# Patient Record
Sex: MALE | Race: WHITE | NOT HISPANIC OR LATINO
[De-identification: names, ages, dates, MRNs, and addresses within clinical notes are randomized per-mention and may not be internally consistent; named-entity substitution may affect disease eponyms.]

---

## 2020-10-22 ENCOUNTER — APPOINTMENT (OUTPATIENT)
Dept: UROLOGY | Facility: CLINIC | Age: 70
End: 2020-10-22
Payer: MEDICARE

## 2020-10-22 VITALS
BODY MASS INDEX: 28.63 KG/M2 | SYSTOLIC BLOOD PRESSURE: 153 MMHG | TEMPERATURE: 97.3 F | DIASTOLIC BLOOD PRESSURE: 92 MMHG | HEIGHT: 70 IN | WEIGHT: 200 LBS | HEART RATE: 78 BPM | RESPIRATION RATE: 14 BRPM

## 2020-10-22 DIAGNOSIS — Z78.9 OTHER SPECIFIED HEALTH STATUS: ICD-10-CM

## 2020-10-22 DIAGNOSIS — Z60.2 PROBLEMS RELATED TO LIVING ALONE: ICD-10-CM

## 2020-10-22 PROBLEM — Z00.00 ENCOUNTER FOR PREVENTIVE HEALTH EXAMINATION: Status: ACTIVE | Noted: 2020-10-22

## 2020-10-22 PROCEDURE — 99204 OFFICE O/P NEW MOD 45 MIN: CPT

## 2020-10-22 SDOH — SOCIAL STABILITY - SOCIAL INSECURITY: PROBLEMS RELATED TO LIVING ALONE: Z60.2

## 2020-10-22 NOTE — HISTORY OF PRESENT ILLNESS
[FreeTextEntry1] : The patient is a 70-year-old gentleman was seen in the office today for the above. This symptom has been going on for 6-12 months. His daytime frequency is 8 times a day with nocturia x2. He also has a slow stream but denies all other urological and constitutional symptomatology.\par \par Past Urological History: Negative\par \par Urological Family History: Negative

## 2020-10-22 NOTE — PHYSICAL EXAM
[General Appearance - Well Developed] : well developed [General Appearance - Well Nourished] : well nourished [Normal Appearance] : normal appearance [Well Groomed] : well groomed [General Appearance - In No Acute Distress] : no acute distress [Edema] : no peripheral edema [FreeTextEntry1] : s1-S2 normal without murmur rub or gallop [Respiration, Rhythm And Depth] : normal respiratory rhythm and effort [Exaggerated Use Of Accessory Muscles For Inspiration] : no accessory muscle use [Auscultation Breath Sounds / Voice Sounds] : lungs were clear to auscultation bilaterally [Bowel Sounds] : normal bowel sounds [Abdomen Soft] : soft [Abdomen Tenderness] : non-tender [Abdomen Mass (___ Cm)] : no abdominal mass palpated [Costovertebral Angle Tenderness] : no ~M costovertebral angle tenderness [Urethral Meatus] : meatus normal [Penis Abnormality] : normal circumcised penis [Urinary Bladder Findings] : the bladder was normal on palpation [Scrotum] : the scrotum was normal [Epididymis] : the epididymides were normal [Testes Tenderness] : no tenderness of the testes [Testes Mass (___cm)] : there were no testicular masses [Anus Abnormality] : the anus and perineum were normal [Rectal Exam - Rectum] : digital rectal exam was normal [Prostate Tenderness] : the prostate was not tender [No Prostate Nodules] : no prostate nodules [Prostate Size ___ gm] : prostate size [unfilled] gm [Normal Station and Gait] : the gait and station were normal for the patient's age [] : no rash [No Focal Deficits] : no focal deficits [Oriented To Time, Place, And Person] : oriented to person, place, and time [Affect] : the affect was normal [Mood] : the mood was normal [Not Anxious] : not anxious [No Palpable Adenopathy] : no palpable adenopathy

## 2020-10-22 NOTE — ASSESSMENT
[FreeTextEntry1] : #1) symptomatic obstructive BPH: difficulty initiating the first void of the morning and has a slow dribbling stream-Daytime frequency 8 times a day with nocturia x2.\par Trial of tamsulosin 1HS\par Offered obtaining a PSA but he wanted to forego this for the time being\par \par RTO 2 months for reevaluation.

## 2020-11-11 ENCOUNTER — APPOINTMENT (OUTPATIENT)
Dept: UROLOGY | Facility: CLINIC | Age: 70
End: 2020-11-11
Payer: MEDICARE

## 2020-11-11 VITALS — BODY MASS INDEX: 28.63 KG/M2 | RESPIRATION RATE: 16 BRPM | TEMPERATURE: 97.7 F | HEIGHT: 70 IN | WEIGHT: 200 LBS

## 2020-11-11 PROCEDURE — 51702 INSERT TEMP BLADDER CATH: CPT

## 2020-11-11 PROCEDURE — 51798 US URINE CAPACITY MEASURE: CPT

## 2020-11-11 PROCEDURE — 99213 OFFICE O/P EST LOW 20 MIN: CPT | Mod: 25

## 2020-11-18 ENCOUNTER — APPOINTMENT (OUTPATIENT)
Dept: UROLOGY | Facility: CLINIC | Age: 70
End: 2020-11-18

## 2020-11-18 ENCOUNTER — APPOINTMENT (OUTPATIENT)
Dept: UROLOGY | Facility: CLINIC | Age: 70
End: 2020-11-18
Payer: MEDICARE

## 2020-11-18 VITALS — WEIGHT: 200 LBS | TEMPERATURE: 97.3 F | RESPIRATION RATE: 16 BRPM | BODY MASS INDEX: 28.63 KG/M2 | HEIGHT: 70 IN

## 2020-11-18 PROCEDURE — 51702 INSERT TEMP BLADDER CATH: CPT

## 2020-12-09 ENCOUNTER — APPOINTMENT (OUTPATIENT)
Dept: UROLOGY | Facility: CLINIC | Age: 70
End: 2020-12-09
Payer: MEDICARE

## 2020-12-09 VITALS — TEMPERATURE: 96.5 F | DIASTOLIC BLOOD PRESSURE: 78 MMHG | SYSTOLIC BLOOD PRESSURE: 117 MMHG | HEART RATE: 125 BPM

## 2020-12-09 PROCEDURE — 51702 INSERT TEMP BLADDER CATH: CPT

## 2020-12-23 ENCOUNTER — APPOINTMENT (OUTPATIENT)
Dept: UROLOGY | Facility: CLINIC | Age: 70
End: 2020-12-23
Payer: MEDICARE

## 2020-12-23 VITALS — TEMPERATURE: 97.2 F

## 2020-12-23 PROCEDURE — 99213 OFFICE O/P EST LOW 20 MIN: CPT

## 2020-12-23 NOTE — HISTORY OF PRESENT ILLNESS
[FreeTextEntry1] : The patient is a 70-year-old gentleman who was seen in the office today for the above.  He has been on tamsulosin 2 capsules and finasteride since 11/11/2020 and his last trial of voiding was on 12/9/2020.  Initially it was successful but he had to go to the emergency room at [facility] 9 PM that night because he was unable to void.  The catheter was reinserted.\par \par His past medical history, surgical history, medication history and allergy history are unchanged.

## 2020-12-30 ENCOUNTER — APPOINTMENT (OUTPATIENT)
Dept: UROLOGY | Facility: CLINIC | Age: 70
End: 2020-12-30
Payer: MEDICARE

## 2020-12-30 PROCEDURE — ZZZZZ: CPT

## 2020-12-31 ENCOUNTER — APPOINTMENT (OUTPATIENT)
Dept: UROLOGY | Facility: CLINIC | Age: 70
End: 2020-12-31
Payer: MEDICARE

## 2020-12-31 PROCEDURE — 51702 INSERT TEMP BLADDER CATH: CPT

## 2021-01-01 ENCOUNTER — NON-APPOINTMENT (OUTPATIENT)
Age: 71
End: 2021-01-01

## 2021-01-01 LAB
25(OH)D3 SERPL-MCNC: 35.8 NG/ML
ALBUMIN SERPL ELPH-MCNC: 4.5 G/DL
ALP BLD-CCNC: 310 U/L
ALT SERPL-CCNC: 10 U/L
ANION GAP SERPL CALC-SCNC: 13 MMOL/L
AST SERPL-CCNC: 14 U/L
BILIRUB SERPL-MCNC: 0.4 MG/DL
BUN SERPL-MCNC: 15 MG/DL
CALCIUM SERPL-MCNC: 6.3 MG/DL
CHLORIDE SERPL-SCNC: 105 MMOL/L
CO2 SERPL-SCNC: 23 MMOL/L
CREAT SERPL-MCNC: 0.92 MG/DL
GLUCOSE SERPL-MCNC: 123 MG/DL
LDH SERPL-CCNC: 279 U/L
MAGNESIUM SERPL-MCNC: 2.1 MG/DL
POTASSIUM SERPL-SCNC: 4 MMOL/L
PROT SERPL-MCNC: 6.8 G/DL
PSA SERPL-MCNC: 75 NG/ML
SODIUM SERPL-SCNC: 141 MMOL/L
TESTOST SERPL-MCNC: <2.5 NG/DL

## 2021-02-10 ENCOUNTER — APPOINTMENT (OUTPATIENT)
Dept: UROLOGY | Facility: CLINIC | Age: 71
End: 2021-02-10
Payer: MEDICARE

## 2021-02-10 VITALS — TEMPERATURE: 96 F

## 2021-02-10 PROCEDURE — 99213 OFFICE O/P EST LOW 20 MIN: CPT

## 2021-02-10 NOTE — ASSESSMENT
[FreeTextEntry1] : 1.)  Urinary retention secondary to BPH\par \par 2.)  Symptomatic obstructive BPH: Continue tamsulosin 2 capsules at bedtime and finasteride.\par \par RTO 6 months for reevaluation.

## 2021-02-10 NOTE — HISTORY OF PRESENT ILLNESS
[FreeTextEntry1] : The patient is a 70-year-old gentleman who was seen in the office today for the above.  He is presently on tamsulosin 2 capsules at bedtime and finasteride and is voiding without difficulty.  His daytime frequency is about every 2 hours with nocturia x2.  He denies all other urological and constitutional symptomatology.  His stream is much stronger.\par \par His past medical history, surgical history, medication history and allergy history are unchanged.

## 2021-02-10 NOTE — PHYSICAL EXAM
[General Appearance - Well Developed] : well developed [Normal Appearance] : normal appearance [General Appearance - Well Nourished] : well nourished [Well Groomed] : well groomed [General Appearance - In No Acute Distress] : no acute distress [Bowel Sounds] : normal bowel sounds [Abdomen Soft] : soft [Abdomen Tenderness] : non-tender [Abdomen Mass (___ Cm)] : no abdominal mass palpated [Costovertebral Angle Tenderness] : no ~M costovertebral angle tenderness [] : no rash [Edema] : no peripheral edema [Affect] : the affect was normal [Oriented To Time, Place, And Person] : oriented to person, place, and time [Mood] : the mood was normal [Not Anxious] : not anxious [Normal Station and Gait] : the gait and station were normal for the patient's age [No Focal Deficits] : no focal deficits

## 2021-02-17 ENCOUNTER — APPOINTMENT (OUTPATIENT)
Dept: UROLOGY | Facility: CLINIC | Age: 71
End: 2021-02-17

## 2021-06-03 ENCOUNTER — APPOINTMENT (OUTPATIENT)
Dept: UROLOGY | Facility: CLINIC | Age: 71
End: 2021-06-03
Payer: MEDICARE

## 2021-06-03 VITALS — DIASTOLIC BLOOD PRESSURE: 64 MMHG | TEMPERATURE: 98.1 F | HEART RATE: 90 BPM | SYSTOLIC BLOOD PRESSURE: 126 MMHG

## 2021-06-03 DIAGNOSIS — N52.01 ERECTILE DYSFUNCTION DUE TO ARTERIAL INSUFFICIENCY: ICD-10-CM

## 2021-06-03 PROCEDURE — 99214 OFFICE O/P EST MOD 30 MIN: CPT

## 2021-06-03 NOTE — HISTORY OF PRESENT ILLNESS
[FreeTextEntry1] : The patient is a 70-year-old gentleman who was seen in the office today for the above.  He is presently on tamsulosin 2 capsules at bedtime and finasteride and is voiding every hour in small amounts and he has nocturia x6.. At the last visit, he was taught self intermittent catheterization. For the last few weeks, he has been catheterizing himself twice in the middle of the night and voids spontaneously once in the middle of the night. He denies all other urological and constitutional symptomatology. So by catheterizing himself in the middle of the night he has decreased his nocturia from 6 times to 3 times (voiding once and cathing twice). He also is complaining of erectile dysfunction and is requesting medication.\par \par His past medical history, surgical history, medication history and allergy history are unchanged.

## 2021-06-03 NOTE — ASSESSMENT
[FreeTextEntry1] : 1.)  Urinary retention secondary to BPH\par \par 2.)  Symptomatic obstructive BPH: Continue tamsulosin 2 capsules at bedtime and finasteride.\par PSA ordered for next week-directed to call the office for the result.\par Complete urodynamic studies will be obtained to assess his bladder function in preparation for a possible TUR surgery. He is interested in having this done.\par He will be seen by the same urologist for the possible TUR who performs the urodynamics.\par \par #3) erectile dysfunction: Arterial insufficiency\par Start Cialis 20 mg p.o. as needed\par \par .

## 2021-06-09 ENCOUNTER — APPOINTMENT (OUTPATIENT)
Dept: UROLOGY | Facility: CLINIC | Age: 71
End: 2021-06-09
Payer: MEDICARE

## 2021-06-09 VITALS
WEIGHT: 200 LBS | HEART RATE: 85 BPM | SYSTOLIC BLOOD PRESSURE: 146 MMHG | OXYGEN SATURATION: 96 % | HEIGHT: 70 IN | DIASTOLIC BLOOD PRESSURE: 77 MMHG | BODY MASS INDEX: 28.63 KG/M2

## 2021-06-09 PROCEDURE — 51728 CYSTOMETROGRAM W/VP: CPT

## 2021-06-09 PROCEDURE — 51741 ELECTRO-UROFLOWMETRY FIRST: CPT

## 2021-06-09 PROCEDURE — 51784 ANAL/URINARY MUSCLE STUDY: CPT

## 2021-06-09 PROCEDURE — 51797 INTRAABDOMINAL PRESSURE TEST: CPT

## 2021-06-10 ENCOUNTER — APPOINTMENT (OUTPATIENT)
Dept: UROLOGY | Facility: CLINIC | Age: 71
End: 2021-06-10
Payer: MEDICARE

## 2021-06-10 VITALS — TEMPERATURE: 98.1 F

## 2021-06-10 DIAGNOSIS — R33.9 RETENTION OF URINE, UNSPECIFIED: ICD-10-CM

## 2021-06-10 PROCEDURE — 99213 OFFICE O/P EST LOW 20 MIN: CPT

## 2021-06-10 NOTE — HISTORY OF PRESENT ILLNESS
[FreeTextEntry1] : The patient is a 70-year-old gentleman who was seen in the office today for the above.  He has no new urological or constitutional symptomatology.\par \par His past medical history, surgical history, medication history and allergy history are unchanged.

## 2021-06-10 NOTE — ASSESSMENT
[FreeTextEntry1] : 1.)  Obstructive symptomatic BPH: On tamsulosin 2 capsules and finasteride as well as intermittent self catheterization.\par Urodynamics is consistent with outlet obstruction.  The studies were performed by Dr. Jefferson.\par He was referred to Dr. Jefferson to discuss the surgical options for a transurethral procedure.  He already told the patient that he will also need an office cystoscopy and a prostate ultrasound to assess the prostate further.

## 2021-06-11 ENCOUNTER — NON-APPOINTMENT (OUTPATIENT)
Age: 71
End: 2021-06-11

## 2021-06-11 ENCOUNTER — APPOINTMENT (OUTPATIENT)
Dept: FAMILY MEDICINE | Facility: CLINIC | Age: 71
End: 2021-06-11
Payer: MEDICARE

## 2021-06-11 VITALS
TEMPERATURE: 96.8 F | OXYGEN SATURATION: 98 % | DIASTOLIC BLOOD PRESSURE: 82 MMHG | SYSTOLIC BLOOD PRESSURE: 150 MMHG | HEART RATE: 87 BPM | WEIGHT: 192 LBS | BODY MASS INDEX: 27.49 KG/M2 | HEIGHT: 70 IN

## 2021-06-11 PROCEDURE — 99213 OFFICE O/P EST LOW 20 MIN: CPT

## 2021-06-11 NOTE — ASSESSMENT
[FreeTextEntry1] : pain present mainly at night time\par exam unremarkable at this time\par given zanaflex to take prn \par follow up as needed or if no relief

## 2021-06-11 NOTE — HISTORY OF PRESENT ILLNESS
[FreeTextEntry8] : PT presenting for left sided back pain x 5 days\par thinks he pulled a muscle while moving stuff in his house. \par Took aleve./tylenol no relief\par \par HAppened previously in November- had similar pain, took 1 oxycontin and pain went away until now\par no numbness or tingling\par no radiating pain\par sharp back pain only at night\par fine during the day \par wants to know if he can have something to help \par \par

## 2021-06-15 ENCOUNTER — APPOINTMENT (OUTPATIENT)
Dept: UROLOGY | Facility: CLINIC | Age: 71
End: 2021-06-15
Payer: MEDICARE

## 2021-06-15 ENCOUNTER — APPOINTMENT (OUTPATIENT)
Dept: FAMILY MEDICINE | Facility: CLINIC | Age: 71
End: 2021-06-15
Payer: MEDICARE

## 2021-06-15 VITALS
OXYGEN SATURATION: 97 % | TEMPERATURE: 97.1 F | SYSTOLIC BLOOD PRESSURE: 142 MMHG | HEART RATE: 90 BPM | BODY MASS INDEX: 26.92 KG/M2 | DIASTOLIC BLOOD PRESSURE: 86 MMHG | HEIGHT: 70 IN | WEIGHT: 188 LBS

## 2021-06-15 VITALS — TEMPERATURE: 98.3 F

## 2021-06-15 PROCEDURE — 99214 OFFICE O/P EST MOD 30 MIN: CPT

## 2021-06-15 PROCEDURE — 99213 OFFICE O/P EST LOW 20 MIN: CPT

## 2021-06-15 RX ORDER — TIZANIDINE HYDROCHLORIDE 6 MG/1
6 CAPSULE ORAL TWICE DAILY
Qty: 10 | Refills: 0 | Status: DISCONTINUED | COMMUNITY
Start: 2021-06-11 | End: 2021-06-15

## 2021-06-15 RX ORDER — TIZANIDINE HYDROCHLORIDE 4 MG/1
4 CAPSULE ORAL
Qty: 30 | Refills: 0 | Status: DISCONTINUED | COMMUNITY
Start: 2021-06-11 | End: 2021-06-15

## 2021-06-15 NOTE — HISTORY OF PRESENT ILLNESS
[FreeTextEntry1] : 69 yo male presents to discuss further management for Benign Prostatic Hyperplasia. \par On Flomax and Finasteride. \par Reports variable stream, urinates every 2-3 hours or so during the day. Nocturia of 1-2 x. \par Catheterizes 9p and 4a. \par Has off and on hesitancy, intermittency, sense of incomplete emptying, urgency and urge incontinence. \par Denies dysuria, hematuria, lower abdominal or flank pain, fever, chills or rigors.\par Has Erectile dysfunction. \par No family history of Prostate cancer. \par \par Underwent Urodynamics with me last week. \par \par

## 2021-06-15 NOTE — HISTORY OF PRESENT ILLNESS
[FreeTextEntry8] : PT presenting for followup of back pain. \par Was placed on zanaflex- had severe reaction to it and got stopped it yday\par it did help a little\par yesterday realized that his bed may be the issue\par is getting a newe bed today\par oxycodone helped in the past

## 2021-06-15 NOTE — ASSESSMENT
[FreeTextEntry1] : Benign Prostatic Hyperplasia:\par Discussed treatment options mainly: continued medical management with alpha blocker and or 5 alpha reductase inhibitor Vs Clean intermittent catheterization/Indwelling Fontana catheter Vs Surgery: Transurethral water vapor ablation/resection/vaporization of Prostate/Simple prostatectomy- open Vs robotic after appropriate work up.\par Patient wants to proceed with further work up. \par Will get Urinalysis and Urine culture.\par PSA pending. \par \par Return to office for Prostate Ultrasound- will do Uroflo/PVR. \par \par \par

## 2021-07-05 LAB — PSA SERPL-MCNC: 921 NG/ML

## 2021-07-06 ENCOUNTER — APPOINTMENT (OUTPATIENT)
Dept: UROLOGY | Facility: CLINIC | Age: 71
End: 2021-07-06
Payer: MEDICARE

## 2021-07-06 VITALS — TEMPERATURE: 98 F

## 2021-07-06 DIAGNOSIS — N39.0 URINARY TRACT INFECTION, SITE NOT SPECIFIED: ICD-10-CM

## 2021-07-06 LAB
APPEARANCE: CLEAR
BACTERIA UR CULT: ABNORMAL
BACTERIA: ABNORMAL
BILIRUBIN URINE: NEGATIVE
BLOOD URINE: ABNORMAL
COLOR: NORMAL
GLUCOSE QUALITATIVE U: NEGATIVE
HYALINE CASTS: 0 /LPF
KETONES URINE: NEGATIVE
LEUKOCYTE ESTERASE URINE: ABNORMAL
MICROSCOPIC-UA: NORMAL
NITRITE URINE: POSITIVE
PH URINE: 5.5
PROTEIN URINE: NEGATIVE
RED BLOOD CELLS URINE: 2 /HPF
SPECIFIC GRAVITY URINE: 1.02
SQUAMOUS EPITHELIAL CELLS: 0 /HPF
UROBILINOGEN URINE: NORMAL
WHITE BLOOD CELLS URINE: 17 /HPF

## 2021-07-06 PROCEDURE — 99214 OFFICE O/P EST MOD 30 MIN: CPT

## 2021-07-19 NOTE — ASSESSMENT
[FreeTextEntry1] : Benign Prostatic Hyperplasia:\par Urinary tract infection:\par Elevated PSA:\par Will start Cefuroxime \par Recommended repeat Urine test 1 week after completion of Antibiotics course. \par Will repeat PSA.\par Continue Flomax and Finasteride. \par Continue clean intermittent catheterizations BID. \par Will inform results. If still concerning will get MRI Prostate. \par \par Will do Cystoscopy at later date.

## 2021-07-19 NOTE — HISTORY OF PRESENT ILLNESS
[FreeTextEntry1] : 71 yo male presents for follow up. \par Urinating with more frequency, day before yesterday felt tired, was urinating every 30 minutes or so. \par Doing clean intermittent catheterizations 2 x a day. \par \par Recently seen to discuss further management for Benign Prostatic Hyperplasia. \par On Flomax and Finasteride. \par Reported variable stream, urinated every 2-3 hours or so during the day. Nocturia of 1-2 x. \par Catheterized 9p and 4a. \par Had off and on hesitancy, intermittency, sense of incomplete emptying, urgency and urge incontinence. \par Denied dysuria, hematuria, lower abdominal or flank pain, fever, chills or rigors.\par Has Erectile dysfunction. \par No family history of Prostate cancer. \par \par Underwent Urodynamics(6/9/21): bladder outlet obstruction.\par \par

## 2021-07-22 LAB
APPEARANCE: CLEAR
BACTERIA UR CULT: NORMAL
BACTERIA: NEGATIVE
BILIRUBIN URINE: NEGATIVE
BLOOD URINE: ABNORMAL
COLOR: NORMAL
GLUCOSE QUALITATIVE U: NEGATIVE
HYALINE CASTS: 0 /LPF
KETONES URINE: NORMAL
LEUKOCYTE ESTERASE URINE: NEGATIVE
MICROSCOPIC-UA: NORMAL
NITRITE URINE: NEGATIVE
PH URINE: 5.5
PROTEIN URINE: NORMAL
PSA FREE FLD-MCNC: NORMAL %
PSA FREE SERPL-MCNC: >50 NG/ML
PSA SERPL-MCNC: 1079 NG/ML
RED BLOOD CELLS URINE: 13 /HPF
SPECIFIC GRAVITY URINE: 1.02
SQUAMOUS EPITHELIAL CELLS: 0 /HPF
UROBILINOGEN URINE: NORMAL
WHITE BLOOD CELLS URINE: 1 /HPF

## 2021-07-27 ENCOUNTER — APPOINTMENT (OUTPATIENT)
Dept: FAMILY MEDICINE | Facility: CLINIC | Age: 71
End: 2021-07-27
Payer: MEDICARE

## 2021-07-27 VITALS
BODY MASS INDEX: 25.34 KG/M2 | HEIGHT: 70 IN | SYSTOLIC BLOOD PRESSURE: 126 MMHG | WEIGHT: 177 LBS | HEART RATE: 100 BPM | OXYGEN SATURATION: 97 % | DIASTOLIC BLOOD PRESSURE: 70 MMHG | TEMPERATURE: 97.4 F

## 2021-07-27 PROCEDURE — 99213 OFFICE O/P EST LOW 20 MIN: CPT

## 2021-07-28 ENCOUNTER — APPOINTMENT (OUTPATIENT)
Dept: RADIOLOGY | Facility: CLINIC | Age: 71
End: 2021-07-28
Payer: MEDICARE

## 2021-07-28 ENCOUNTER — OUTPATIENT (OUTPATIENT)
Dept: OUTPATIENT SERVICES | Facility: HOSPITAL | Age: 71
LOS: 1 days | End: 2021-07-28
Payer: MEDICARE

## 2021-07-28 DIAGNOSIS — M25.559 PAIN IN UNSPECIFIED HIP: ICD-10-CM

## 2021-07-28 PROCEDURE — 73502 X-RAY EXAM HIP UNI 2-3 VIEWS: CPT

## 2021-07-28 PROCEDURE — 73502 X-RAY EXAM HIP UNI 2-3 VIEWS: CPT | Mod: 26,RT

## 2021-07-28 NOTE — REVIEW OF SYSTEMS
[Hesitancy] : hesitancy [Frequency] : frequency [Joint Pain] : joint pain [Joint Stiffness] : joint stiffness [Muscle Pain] : muscle pain [Back Pain] : back pain [Joint Swelling] : joint swelling [Negative] : Constitutional

## 2021-07-28 NOTE — HISTORY OF PRESENT ILLNESS
[FreeTextEntry8] : Having Hip pain x 1 month \par was on the roof trying to clean the roof\par thinks he sprained the groin muscle on the right \par difficulty when he gets up but then able to ambulate without concern \par \par Went to see urology - elevated PSA\par scheduled for MRI of the prostate next week

## 2021-07-28 NOTE — PHYSICAL EXAM
[Normal] : no acute distress, well nourished, well developed and well-appearing [de-identified] : no pain in hip limited ROM on right side.

## 2021-08-04 ENCOUNTER — APPOINTMENT (OUTPATIENT)
Dept: MRI IMAGING | Facility: CLINIC | Age: 71
End: 2021-08-04
Payer: MEDICARE

## 2021-08-04 ENCOUNTER — APPOINTMENT (OUTPATIENT)
Dept: CT IMAGING | Facility: CLINIC | Age: 71
End: 2021-08-04
Payer: MEDICARE

## 2021-08-04 ENCOUNTER — RESULT REVIEW (OUTPATIENT)
Age: 71
End: 2021-08-04

## 2021-08-04 ENCOUNTER — OUTPATIENT (OUTPATIENT)
Dept: OUTPATIENT SERVICES | Facility: HOSPITAL | Age: 71
LOS: 1 days | End: 2021-08-04
Payer: MEDICARE

## 2021-08-04 DIAGNOSIS — R97.20 ELEVATED PROSTATE SPECIFIC ANTIGEN [PSA]: ICD-10-CM

## 2021-08-04 PROCEDURE — 74178 CT ABD&PLV WO CNTR FLWD CNTR: CPT

## 2021-08-04 PROCEDURE — 76377 3D RENDER W/INTRP POSTPROCES: CPT | Mod: 26

## 2021-08-04 PROCEDURE — 72197 MRI PELVIS W/O & W/DYE: CPT | Mod: 26,MH

## 2021-08-04 PROCEDURE — 82565 ASSAY OF CREATININE: CPT

## 2021-08-04 PROCEDURE — 74178 CT ABD&PLV WO CNTR FLWD CNTR: CPT | Mod: 26,MH

## 2021-08-04 PROCEDURE — 76377 3D RENDER W/INTRP POSTPROCES: CPT

## 2021-08-04 PROCEDURE — 72197 MRI PELVIS W/O & W/DYE: CPT

## 2021-08-10 ENCOUNTER — APPOINTMENT (OUTPATIENT)
Dept: UROLOGY | Facility: CLINIC | Age: 71
End: 2021-08-10
Payer: MEDICARE

## 2021-08-10 VITALS — TEMPERATURE: 97.9 F

## 2021-08-10 DIAGNOSIS — Z01.818 ENCOUNTER FOR OTHER PREPROCEDURAL EXAMINATION: ICD-10-CM

## 2021-08-10 DIAGNOSIS — R31.29 OTHER MICROSCOPIC HEMATURIA: ICD-10-CM

## 2021-08-10 DIAGNOSIS — F41.1 GENERALIZED ANXIETY DISORDER: ICD-10-CM

## 2021-08-10 PROCEDURE — 99214 OFFICE O/P EST MOD 30 MIN: CPT

## 2021-08-11 ENCOUNTER — NON-APPOINTMENT (OUTPATIENT)
Age: 71
End: 2021-08-11

## 2021-08-18 ENCOUNTER — APPOINTMENT (OUTPATIENT)
Dept: UROLOGY | Facility: CLINIC | Age: 71
End: 2021-08-18

## 2021-08-18 PROBLEM — R31.29 MICROHEMATURIA: Status: ACTIVE | Noted: 2021-07-22

## 2021-08-18 NOTE — ASSESSMENT
[FreeTextEntry1] : Elevated PSA:\par Discussed MRI Prostate findings. \par Recommended MRI fusion prostate biopsy.\par Discussed real time ultrasound will be calibrated with MRI images and additional target biopsies will be performed besides regular 12 core Prostate biopsy.\par Explained to the patient that the procedure would last approximately 20 minutes beginning first with insertion of the ultrasound probe, injection of local anesthetic, and finally multiple samples would be obtained with a needle biopsy through the perineum.\par Reviewed the risks, benefits, and possible complications including inability to urinate, infection, and bleeding. The patient understood that blood in the urine, stool, and semen is common for several days to weeks, and that he should go immediately to the Emergency Room if he develops fever, chills, nausea or vomiting. \par All questions answered. The patient verbalized understanding, and has agreed to proceed. \par Patient was provided with a copy of instruction sheet. \par \par Order placed for Valium and Fleet's enema. \par Pt instructed to take Valium an hour before the procedure and to give himself enema the morning of the procedure. \par Pt instructed to stop taking ASA/Plavix/Coumadin/NSAIDs 1 week before appointment.\par \par Will schedule with Dr Ulisses dempsey available. \par \par Microhematuria:\par Discussed work up of hematuria so far. \par Will do Urine cytology and Cystoscopy at later date.

## 2021-08-18 NOTE — HISTORY OF PRESENT ILLNESS
[FreeTextEntry1] : 70 yo male presents for follow up. \par Had MRI Prostate and CT scan. \par Doing clean intermittent catheterizations 2 x a day. \par \par Recently seen to discuss further management for Benign Prostatic Hyperplasia. \par On Flomax and Finasteride. \par Reported variable stream, urinated every 2-3 hours or so during the day. Nocturia of 1-2 x. \par Catheterized 9p and 4a. \par Had off and on hesitancy, intermittency, sense of incomplete emptying, urgency and urge incontinence. \par Denied dysuria, hematuria, lower abdominal or flank pain, fever, chills or rigors.\par Has Erectile dysfunction. \par No family history of Prostate cancer. \par \par Underwent Urodynamics(6/9/21): bladder outlet obstruction.\par \par

## 2021-08-31 ENCOUNTER — NON-APPOINTMENT (OUTPATIENT)
Age: 71
End: 2021-08-31

## 2021-09-01 ENCOUNTER — APPOINTMENT (OUTPATIENT)
Dept: UROLOGY | Facility: CLINIC | Age: 71
End: 2021-09-01

## 2021-09-01 ENCOUNTER — APPOINTMENT (OUTPATIENT)
Dept: FAMILY MEDICINE | Facility: CLINIC | Age: 71
End: 2021-09-01
Payer: MEDICARE

## 2021-09-01 ENCOUNTER — APPOINTMENT (OUTPATIENT)
Dept: UROLOGY | Facility: CLINIC | Age: 71
End: 2021-09-01
Payer: MEDICARE

## 2021-09-01 VITALS
DIASTOLIC BLOOD PRESSURE: 65 MMHG | OXYGEN SATURATION: 97 % | WEIGHT: 163.4 LBS | BODY MASS INDEX: 23.39 KG/M2 | SYSTOLIC BLOOD PRESSURE: 107 MMHG | HEART RATE: 99 BPM | TEMPERATURE: 98 F | HEIGHT: 70 IN | RESPIRATION RATE: 17 BRPM

## 2021-09-01 VITALS
HEART RATE: 102 BPM | DIASTOLIC BLOOD PRESSURE: 68 MMHG | TEMPERATURE: 97 F | OXYGEN SATURATION: 96 % | WEIGHT: 167 LBS | BODY MASS INDEX: 23.91 KG/M2 | HEIGHT: 70 IN | SYSTOLIC BLOOD PRESSURE: 102 MMHG

## 2021-09-01 PROCEDURE — 76942 ECHO GUIDE FOR BIOPSY: CPT | Mod: 59

## 2021-09-01 PROCEDURE — 76872 US TRANSRECTAL: CPT

## 2021-09-01 PROCEDURE — 99214 OFFICE O/P EST MOD 30 MIN: CPT

## 2021-09-01 PROCEDURE — 55700: CPT | Mod: 22

## 2021-09-01 NOTE — REVIEW OF SYSTEMS
[Joint Pain] : joint pain [Joint Stiffness] : joint stiffness [Muscle Pain] : muscle pain [Back Pain] : back pain [Negative] : Constitutional

## 2021-09-01 NOTE — ASSESSMENT
[FreeTextEntry1] : cont tylenol/aleve prn\par await results of prostate biopsy\par given muscle relaxant \par refilled oxy reviewed  \par take prn

## 2021-09-01 NOTE — PHYSICAL EXAM
[Normal] : no acute distress, well nourished, well developed and well-appearing [de-identified] : hip pain, left trap pain

## 2021-09-01 NOTE — HISTORY OF PRESENT ILLNESS
[FreeTextEntry1] : LEft shoulder pain  [de-identified] : Followup of prostate issues, hip pain, left shoulde rpain \par \par \par Prostate- had biopsy done this AM, awaiting results. Will have results in approx 2 weeks\par lost 10 lbs since last visit 5 weeks ago \par \par Hip pain- slightly improved, using cane\par aleve was helping a lot, had to stop 1 week prior to biopsy \par is taking tylenol not helping as much\par \par Left shoulder- was trying to exercise with wrenches, thinks he pulled a muscle\par heat and pressure help a lot \par no numbness or tingling. \par

## 2021-09-15 ENCOUNTER — APPOINTMENT (OUTPATIENT)
Dept: UROLOGY | Facility: CLINIC | Age: 71
End: 2021-09-15
Payer: MEDICARE

## 2021-09-15 VITALS
HEIGHT: 70 IN | OXYGEN SATURATION: 96 % | BODY MASS INDEX: 23.91 KG/M2 | DIASTOLIC BLOOD PRESSURE: 67 MMHG | WEIGHT: 167 LBS | HEART RATE: 97 BPM | SYSTOLIC BLOOD PRESSURE: 151 MMHG

## 2021-09-15 PROCEDURE — 99214 OFFICE O/P EST MOD 30 MIN: CPT

## 2021-09-16 ENCOUNTER — APPOINTMENT (OUTPATIENT)
Dept: FAMILY MEDICINE | Facility: CLINIC | Age: 71
End: 2021-09-16
Payer: MEDICARE

## 2021-09-16 VITALS
BODY MASS INDEX: 23.91 KG/M2 | WEIGHT: 167 LBS | SYSTOLIC BLOOD PRESSURE: 128 MMHG | TEMPERATURE: 97.6 F | DIASTOLIC BLOOD PRESSURE: 70 MMHG | HEART RATE: 108 BPM | RESPIRATION RATE: 16 BRPM | OXYGEN SATURATION: 98 % | HEIGHT: 70 IN

## 2021-09-16 LAB — CORE LAB BIOPSY: NORMAL

## 2021-09-16 PROCEDURE — 99214 OFFICE O/P EST MOD 30 MIN: CPT

## 2021-09-16 NOTE — PHYSICAL EXAM
[Normal] : normal rate, regular rhythm, normal S1 and S2 and no murmur heard [de-identified] : severe right hip pain

## 2021-09-16 NOTE — REVIEW OF SYSTEMS
[Nocturia] : nocturia [Frequency] : frequency [Joint Stiffness] : joint stiffness [Muscle Pain] : muscle pain [Joint Swelling] : joint swelling [Negative] : Gastrointestinal

## 2021-09-16 NOTE — HISTORY OF PRESENT ILLNESS
[FreeTextEntry1] : hip pain  [de-identified] : Pt presenting for hip pain and followup of urology lab results. \par \par Diagnosed with advances prostate cancer yesterday, scheduled for lab followup and NM bone scan to rule out malignancy \par pt is trying to be optimistic however is very upset which is understandable. \par \par Still having significant right hip pain, oxy and flexiril works well for pain.\par discussed that next step would be MRI of hip however will await results of bone scan. \par Has appt with ortho in 6 weeks however pt is unable to walk or bear weight- needs sooner appt

## 2021-09-17 LAB
ALBUMIN SERPL ELPH-MCNC: 3.5 G/DL
ALP BLD-CCNC: 667 U/L
ALT SERPL-CCNC: 19 U/L
ANION GAP SERPL CALC-SCNC: 15 MMOL/L
AST SERPL-CCNC: 31 U/L
BASOPHILS # BLD AUTO: 0.12 K/UL
BASOPHILS NFR BLD AUTO: 1.3 %
BILIRUB SERPL-MCNC: 0.2 MG/DL
BUN SERPL-MCNC: 26 MG/DL
CALCIUM SERPL-MCNC: 9.2 MG/DL
CHLORIDE SERPL-SCNC: 100 MMOL/L
CO2 SERPL-SCNC: 25 MMOL/L
CREAT SERPL-MCNC: 1.14 MG/DL
EOSINOPHIL # BLD AUTO: 0.32 K/UL
EOSINOPHIL NFR BLD AUTO: 3.6 %
GLUCOSE SERPL-MCNC: 172 MG/DL
HCT VFR BLD CALC: 34.4 %
HGB BLD-MCNC: 10.4 G/DL
IMM GRANULOCYTES NFR BLD AUTO: 1.9 %
LYMPHOCYTES # BLD AUTO: 2.04 K/UL
LYMPHOCYTES NFR BLD AUTO: 22.8 %
MAN DIFF?: NORMAL
MCHC RBC-ENTMCNC: 26.8 PG
MCHC RBC-ENTMCNC: 30.2 GM/DL
MCV RBC AUTO: 88.7 FL
MONOCYTES # BLD AUTO: 0.73 K/UL
MONOCYTES NFR BLD AUTO: 8.1 %
NEUTROPHILS # BLD AUTO: 5.58 K/UL
NEUTROPHILS NFR BLD AUTO: 62.3 %
PLATELET # BLD AUTO: 417 K/UL
POTASSIUM SERPL-SCNC: 4.3 MMOL/L
PROT SERPL-MCNC: 6.6 G/DL
RBC # BLD: 3.88 M/UL
RBC # FLD: 13.8 %
SODIUM SERPL-SCNC: 141 MMOL/L
WBC # FLD AUTO: 8.96 K/UL

## 2021-09-17 NOTE — PHYSICAL EXAM
[General Appearance - In No Acute Distress] : no acute distress [Abdomen Soft] : soft [Urethral Meatus] : meatus normal [Skin Color & Pigmentation] : normal skin color and pigmentation [Edema] : no peripheral edema [] : no respiratory distress [Oriented To Time, Place, And Person] : oriented to person, place, and time [Affect] : the affect was normal [No Focal Deficits] : no focal deficits [No Palpable Adenopathy] : no palpable adenopathy [FreeTextEntry1] : Pt. has a weak gait, ambulated with a cane. c/p right hip pain.

## 2021-09-17 NOTE — ASSESSMENT
[FreeTextEntry1] : Reviewed prostate biopsy results. \par GS 9 (4+5) located in the 2 cores that were taken. Pt. had difficulty tolerating the biopsy procedure.\par Discussed with patient and family member that there is a high suspicion for metastatic prostate cancer.\par NM bone scan ordered for further baseline staging for his prostate cancer. \par Pt. has recently seen his PCP Dr. Vail and was referred to an orthopedic doctor for his right hip/leg pain. \par Pt. encouraged to calll PCP to expedite and earlier appointment with orthopedic doctor. \par Pt. started on biclutamide 50 mg daily. Medication explained to patient. \par It was discussed that patient would need to start on a aggressive treatment regimen for hormonal injection. \par Referred to medical oncologist Dr. Collins for an initial consultation. \par All questions answered and all concerns addressed. \par Pt. is aware that he is not a surgical candidate and that his advanced prostate cancer will be managed by Dr. Collins. \par Pt. and family member verbalize an understanding to plan of care. \par Pt. has an unsteady gait and uses a cane. Wheelchair used in office for patient. Safety measures need to be implemented at home. Discussed with family member. Pt. lives alone. \par We remain available for nay further questions or concerns.

## 2021-09-17 NOTE — HISTORY OF PRESENT ILLNESS
[None] : no symptoms [FreeTextEntry1] : 70 yo presents today for a follow-up appointment to discuss his prostate biopsy results. Transperineal prostate biopsy performed on 9.1.21. Pt. had no complications post-procedure. \par PSA 1079.0\par MRI 8.4.21 PIRADS 5 lesion. See attached results. Pt has been experiencing right hip and leg pain. He is ambulating with a cane. Pt. was not bale to tolerate the biopsy well due to pain and discomfort. 2 cores were taken from lesion.

## 2021-09-20 ENCOUNTER — OUTPATIENT (OUTPATIENT)
Dept: OUTPATIENT SERVICES | Facility: HOSPITAL | Age: 71
LOS: 1 days | Discharge: HOME | End: 2021-09-20

## 2021-09-20 DIAGNOSIS — C61 MALIGNANT NEOPLASM OF PROSTATE: ICD-10-CM

## 2021-09-21 ENCOUNTER — APPOINTMENT (OUTPATIENT)
Dept: ORTHOPEDIC SURGERY | Facility: CLINIC | Age: 71
End: 2021-09-21
Payer: MEDICARE

## 2021-09-21 ENCOUNTER — INPATIENT (INPATIENT)
Facility: HOSPITAL | Age: 71
LOS: 2 days | Discharge: HOME | DRG: 522 | End: 2021-09-24
Attending: HOSPITALIST | Admitting: SURGERY
Payer: MEDICARE

## 2021-09-21 ENCOUNTER — TRANSCRIPTION ENCOUNTER (OUTPATIENT)
Age: 71
End: 2021-09-21

## 2021-09-21 VITALS
HEIGHT: 70 IN | DIASTOLIC BLOOD PRESSURE: 63 MMHG | SYSTOLIC BLOOD PRESSURE: 130 MMHG | BODY MASS INDEX: 23.91 KG/M2 | WEIGHT: 167 LBS | HEART RATE: 103 BPM

## 2021-09-21 VITALS
WEIGHT: 164.91 LBS | RESPIRATION RATE: 20 BRPM | DIASTOLIC BLOOD PRESSURE: 63 MMHG | OXYGEN SATURATION: 98 % | HEIGHT: 71 IN | TEMPERATURE: 98 F | HEART RATE: 105 BPM | SYSTOLIC BLOOD PRESSURE: 149 MMHG

## 2021-09-21 DIAGNOSIS — S72.001A FRACTURE OF UNSPECIFIED PART OF NECK OF RIGHT FEMUR, INITIAL ENCOUNTER FOR CLOSED FRACTURE: ICD-10-CM

## 2021-09-21 DIAGNOSIS — Z90.49 ACQUIRED ABSENCE OF OTHER SPECIFIED PARTS OF DIGESTIVE TRACT: Chronic | ICD-10-CM

## 2021-09-21 LAB
ALBUMIN SERPL ELPH-MCNC: 4.2 G/DL — SIGNIFICANT CHANGE UP (ref 3.3–5.2)
ALP SERPL-CCNC: 817 U/L — HIGH (ref 40–120)
ALT FLD-CCNC: 12 U/L — SIGNIFICANT CHANGE UP
ANION GAP SERPL CALC-SCNC: 15 MMOL/L — SIGNIFICANT CHANGE UP (ref 5–17)
APTT BLD: 28.1 SEC — SIGNIFICANT CHANGE UP (ref 27.5–35.5)
AST SERPL-CCNC: 20 U/L — SIGNIFICANT CHANGE UP
BASOPHILS # BLD AUTO: 0.1 K/UL — SIGNIFICANT CHANGE UP (ref 0–0.2)
BASOPHILS NFR BLD AUTO: 1 % — SIGNIFICANT CHANGE UP (ref 0–2)
BILIRUB SERPL-MCNC: 0.3 MG/DL — LOW (ref 0.4–2)
BLD GP AB SCN SERPL QL: SIGNIFICANT CHANGE UP
BUN SERPL-MCNC: 32.8 MG/DL — HIGH (ref 8–20)
CALCIUM SERPL-MCNC: 9.3 MG/DL — SIGNIFICANT CHANGE UP (ref 8.6–10.2)
CHLORIDE SERPL-SCNC: 101 MMOL/L — SIGNIFICANT CHANGE UP (ref 98–107)
CO2 SERPL-SCNC: 24 MMOL/L — SIGNIFICANT CHANGE UP (ref 22–29)
CREAT SERPL-MCNC: 1.21 MG/DL — SIGNIFICANT CHANGE UP (ref 0.5–1.3)
EOSINOPHIL # BLD AUTO: 0.2 K/UL — SIGNIFICANT CHANGE UP (ref 0–0.5)
EOSINOPHIL NFR BLD AUTO: 2 % — SIGNIFICANT CHANGE UP (ref 0–6)
GLUCOSE SERPL-MCNC: 118 MG/DL — HIGH (ref 70–99)
HCT VFR BLD CALC: 36.6 % — LOW (ref 39–50)
HGB BLD-MCNC: 11.5 G/DL — LOW (ref 13–17)
IMM GRANULOCYTES NFR BLD AUTO: 0.9 % — SIGNIFICANT CHANGE UP (ref 0–1.5)
INR BLD: 1.25 RATIO — HIGH (ref 0.88–1.16)
LYMPHOCYTES # BLD AUTO: 1.92 K/UL — SIGNIFICANT CHANGE UP (ref 1–3.3)
LYMPHOCYTES # BLD AUTO: 18.8 % — SIGNIFICANT CHANGE UP (ref 13–44)
MCHC RBC-ENTMCNC: 27.4 PG — SIGNIFICANT CHANGE UP (ref 27–34)
MCHC RBC-ENTMCNC: 31.4 GM/DL — LOW (ref 32–36)
MCV RBC AUTO: 87.1 FL — SIGNIFICANT CHANGE UP (ref 80–100)
MONOCYTES # BLD AUTO: 0.76 K/UL — SIGNIFICANT CHANGE UP (ref 0–0.9)
MONOCYTES NFR BLD AUTO: 7.4 % — SIGNIFICANT CHANGE UP (ref 2–14)
NEUTROPHILS # BLD AUTO: 7.16 K/UL — SIGNIFICANT CHANGE UP (ref 1.8–7.4)
NEUTROPHILS NFR BLD AUTO: 69.9 % — SIGNIFICANT CHANGE UP (ref 43–77)
PLATELET # BLD AUTO: 309 K/UL — SIGNIFICANT CHANGE UP (ref 150–400)
POTASSIUM SERPL-MCNC: 4.2 MMOL/L — SIGNIFICANT CHANGE UP (ref 3.5–5.3)
POTASSIUM SERPL-SCNC: 4.2 MMOL/L — SIGNIFICANT CHANGE UP (ref 3.5–5.3)
PROT SERPL-MCNC: 7.7 G/DL — SIGNIFICANT CHANGE UP (ref 6.6–8.7)
PROTHROM AB SERPL-ACNC: 14.3 SEC — HIGH (ref 10.6–13.6)
RBC # BLD: 4.2 M/UL — SIGNIFICANT CHANGE UP (ref 4.2–5.8)
RBC # FLD: 13.7 % — SIGNIFICANT CHANGE UP (ref 10.3–14.5)
SODIUM SERPL-SCNC: 140 MMOL/L — SIGNIFICANT CHANGE UP (ref 135–145)
WBC # BLD: 10.23 K/UL — SIGNIFICANT CHANGE UP (ref 3.8–10.5)
WBC # FLD AUTO: 10.23 K/UL — SIGNIFICANT CHANGE UP (ref 3.8–10.5)

## 2021-09-21 PROCEDURE — 93010 ELECTROCARDIOGRAM REPORT: CPT

## 2021-09-21 PROCEDURE — 99285 EMERGENCY DEPT VISIT HI MDM: CPT

## 2021-09-21 PROCEDURE — 99204 OFFICE O/P NEW MOD 45 MIN: CPT

## 2021-09-21 PROCEDURE — 73502 X-RAY EXAM HIP UNI 2-3 VIEWS: CPT

## 2021-09-21 PROCEDURE — 72195 MRI PELVIS W/O DYE: CPT | Mod: 26,MH

## 2021-09-21 PROCEDURE — 71045 X-RAY EXAM CHEST 1 VIEW: CPT | Mod: 26

## 2021-09-21 RX ORDER — IBUPROFEN 200 MG
400 TABLET ORAL ONCE
Refills: 0 | Status: DISCONTINUED | OUTPATIENT
Start: 2021-09-21 | End: 2021-09-21

## 2021-09-21 RX ORDER — CEFUROXIME AXETIL 500 MG/1
500 TABLET ORAL
Qty: 14 | Refills: 0 | Status: DISCONTINUED | COMMUNITY
Start: 2021-07-06 | End: 2021-09-21

## 2021-09-21 RX ORDER — CEFUROXIME AXETIL 500 MG/1
500 TABLET ORAL
Qty: 6 | Refills: 0 | Status: DISCONTINUED | COMMUNITY
Start: 2021-06-09 | End: 2021-09-21

## 2021-09-21 RX ORDER — OXYCODONE HYDROCHLORIDE 5 MG/1
5 TABLET ORAL EVERY 4 HOURS
Refills: 0 | Status: DISCONTINUED | OUTPATIENT
Start: 2021-09-21 | End: 2021-09-22

## 2021-09-21 RX ORDER — ACETAMINOPHEN 500 MG
650 TABLET ORAL EVERY 6 HOURS
Refills: 0 | Status: DISCONTINUED | OUTPATIENT
Start: 2021-09-21 | End: 2021-09-22

## 2021-09-21 RX ORDER — OXYCODONE HYDROCHLORIDE 5 MG/1
10 TABLET ORAL EVERY 4 HOURS
Refills: 0 | Status: DISCONTINUED | OUTPATIENT
Start: 2021-09-21 | End: 2021-09-22

## 2021-09-21 RX ORDER — CEFAZOLIN SODIUM 1 G
2000 VIAL (EA) INJECTION ONCE
Refills: 0 | Status: DISCONTINUED | OUTPATIENT
Start: 2021-09-22 | End: 2021-09-22

## 2021-09-21 NOTE — ED ADULT NURSE NOTE - OBJECTIVE STATEMENT
S/P fall 2 months ago then went & check with PMD, then 3 weeks ago patient fell again, now complaining of Right hip pain

## 2021-09-21 NOTE — ED PROVIDER NOTE - OBJECTIVE STATEMENT
72 yo male PMHx prostate cancer (treatment in progress), appendectomy presents to ED sent in by Dr. Newberry. Patient s/p fall with right femoral neck fracture. Patient with fall 4 weeks ago, went to doctor today for pain. Patient has been ambulating with cane. 71 year old male PMHx prostate cancer (treatment in progress), appendectomy presents to ED sent in by Dr. Newberry. Patient s/p fall with right femoral neck fracture. Patient with fall 4 weeks ago, went to doctor today for pain. Patient has been ambulating with cane.

## 2021-09-21 NOTE — HISTORY OF PRESENT ILLNESS
[de-identified] : Mr. JACK UMANZOR is a 71 year old male Presenting with a three month history of right hip pain. Patient states the pain began in June of 2021, while he was leaning over his roof with his legs spread apart. He felt a sharp pain to the right groin. He went to his doctor who determined there to be no fractures. He was in acute pain and continues to be in pain at this time. His groin pain is worse with all weightbearing activity.   The patient had a fall about a month ago and this exacerbated his hip pain and caused him more difficulty when walking.\par PMHx: Prostate Cancer found earlier this year.

## 2021-09-21 NOTE — CONSULT LETTER
[Dear  ___] : Dear  [unfilled], [Consult Letter:] : I had the pleasure of evaluating your patient, [unfilled]. [Please see my note below.] : Please see my note below. [Sincerely,] : Sincerely, [FreeTextEntry2] : TAMIKO DELONG\par  [FreeTextEntry3] : Collins Edmond MD\par Chief of Joint Replacement \par [facility] \par [facility]\par

## 2021-09-21 NOTE — ED PROVIDER NOTE - CLINICAL SUMMARY MEDICAL DECISION MAKING FREE TEXT BOX
72 yo male presents to ED sent in by Dr. Newberry for right femoral neck fracture. Orthopedics PA at beside.

## 2021-09-21 NOTE — ED PROVIDER NOTE - PHYSICAL EXAMINATION
General: In NAD, non-toxic appearing; well nourished/developed. Presents in wheelchair.   Skin: No rashes or lesions. Warm, dry, color normal for race.   Head: Normocephalic/atraumatic.   Neck: Supple, FROM. No spinal/paraspinal tenderness.   Cardio: Rate and rhythm regular. No audible murmur, gallop, or rub.  PV: Pulses: b/l 2+ radial, DP, PT. Capillary refill <2 seconds.  Resp: Normal AP to lateral diameter, symmetrical excursion b/l. Breath sounds vesicular, symmetrical and without rales, rhonchi or wheezing b/l.  MSK/Neuro: A&Ox3. No deformity. Able to range right hip. FROM knee/ankle. Nontender. No sensory deficits.

## 2021-09-21 NOTE — CONSULT NOTE ADULT - ATTENDING COMMENTS
Admitted from my office with a minimally displaced right femoral neck fracture and newly diagnoses metastatic prostate cancer.  Due to suspicion of pathologic fracture, MRI right hip obtained and confirmed pathologic fracture.  As a result, recommending right cemented hemiarthroplasty.  No pre-existing hip pain reported by patient before 2 falls over the summer, the last of which was in August.  Xrays late July showed no fracture.  RLE NVID on exam.  Plan for OR possibly today.  All r/b/a discussed and all qs answered.

## 2021-09-21 NOTE — CONSULT NOTE ADULT - SUBJECTIVE AND OBJECTIVE BOX
Pt Name: JACK UMANZOR    MRN: [id number]      Patient is a 71y Male presenting to the emergency department sent from the office by Dr. Edmond for right nondisplaced femoral neck fx s/p fall. Pt states that approx 4 weeks ago he had fallen and had been experiencing worsening right hip pain with ambulation since the fall. He states that he had xrays done in Dr. Matthew office that revealed a non displaced right femoral neck fx. Pt otherwise denies fever/chills, c/p, sob, abdominal pain, n/v, numbness/tingling/weakness and has no other complaints at this time.    REVIEW OF SYSTEMS    General: Alert, responsive, in NAD    Skin/Breast: No rashes, no pruritis   	  Ophthalmologic: No visual changes. No redness.   	  ENMT:	No discharge. No swelling.    Respiratory and Thorax: No difficulty breathing. No cough.  	   Cardiovascular:	No chest pain. No palpitations.    Gastrointestinal:	 No abdominal pain. No diarrhea.     Genitourinary: No dysuria. No bleeding.    Musculoskeletal: SEE HPI.    Neurological: No sensory or motor changes.     Psychiatric: No anxiety or depression.    Hematology/Lymphatics: No swelling.    Endocrine: No Hx of diabetes.    ROS is otherwise negative.    PAST MEDICAL & SURGICAL HISTORY:  PAST MEDICAL & SURGICAL HISTORY:  Prostate cancer    S/P appendectomy        Allergies: No Known Allergies      Medications: acetaminophen   Tablet .. 650 milliGRAM(s) Oral every 6 hours PRN  oxyCODONE    IR 5 milliGRAM(s) Oral every 4 hours PRN  oxyCODONE    IR 10 milliGRAM(s) Oral every 4 hours PRN      FAMILY HISTORY:  No pertinent family history in first degree relatives    : non-contributory    Social History: Denies ETOH abuse.    Ambulation: Walking independently [ ] With Cane [ x ] With Walker [ ]  Bedbound [ ]                           11.5   10.23 )-----------( 309      ( 21 Sep 2021 21:28 )             36.6       09-21    140  |  101  |  32.8<H>  ----------------------------<  118<H>  4.2   |  24.0  |  1.21    Ca    9.3      21 Sep 2021 21:28    TPro  7.7  /  Alb  4.2  /  TBili  0.3<L>  /  DBili  x   /  AST  20  /  ALT  12  /  AlkPhos  817<H>  09-21      Vital Signs Last 24 Hrs  T(C): 36.7 (21 Sep 2021 18:41), Max: 36.7 (21 Sep 2021 18:41)  T(F): 98 (21 Sep 2021 18:41), Max: 98 (21 Sep 2021 18:41)  HR: 105 (21 Sep 2021 18:41) (105 - 105)  BP: 149/63 (21 Sep 2021 18:41) (149/63 - 149/63)  BP(mean): --  RR: 20 (21 Sep 2021 18:41) (20 - 20)  SpO2: 98% (21 Sep 2021 18:41) (98% - 98%)    Daily Height in cm: 180.34 (21 Sep 2021 18:41)    Daily       PHYSICAL EXAM:      Appearance: Alert, responsive, in no acute distress.    Neurological: Sensation is grossly intact to light touch. 5/5 motor function of all extremities. No focal deficits or weaknesses found.    Skin: no rash on visible skin. Skin is clean, dry and intact. No bleeding. No abrasions. No ulcerations.    Vascular: 2+ distal pulses. Cap refill < 2 sec. No signs of venous insufficiency or stasis. No extremity ulcerations. No cyanosis.    Musculoskeletal:         Left Upper Extremity:  + NROM. Non-tender. No signs of trauma.        Right Upper Extremity:  + NROM. Non-tender. No signs of trauma.        Left Lower Extremity:  + NROM. Non-tender. No signs of trauma.        Right Lower Extremity: + FROM of the right hip 0-90 degrees. +plantar/dorsiflexion/EHL/FHL intact. Compartments soft and compressible. 2+ DP pulse palpated. No open wounds or active bleeding noted. SILT.    A/P:  Pt is a  71y Male with a non displaced right femoral neck fx.    PLAN d/w Dr. Edmond:  * NPO for OR tomorrow  * MRI R hip - ordered  * IV fluids to start once NPO  * Pre-operative ABX ordered  * Routine daily anticoagulation held for OR  * Admit to trauma for clearance for OR  * NWB RLE Pt Name: JACK UMANZOR    MRN: [id number]      Patient is a 71y Male presenting to the emergency department sent from the office by Dr. Edmond for right nondisplaced femoral neck fx s/p fall. Pt states that approx 4 weeks ago he had fallen and had been experiencing worsening right hip pain with ambulation since the fall. He states that he had xrays done in Dr. Matthew office that revealed a non displaced right femoral neck fx. Pt otherwise denies fever/chills, c/p, sob, abdominal pain, n/v, numbness/tingling/weakness and has no other complaints at this time.    REVIEW OF SYSTEMS    General: Alert, responsive, in NAD    Skin/Breast: No rashes, no pruritis   	  Ophthalmologic: No visual changes. No redness.   	  ENMT:	No discharge. No swelling.    Respiratory and Thorax: No difficulty breathing. No cough.  	   Cardiovascular:	No chest pain. No palpitations.    Gastrointestinal:	 No abdominal pain. No diarrhea.     Genitourinary: No dysuria. No bleeding.    Musculoskeletal: SEE HPI.    Neurological: No sensory or motor changes.     Psychiatric: No anxiety or depression.    Hematology/Lymphatics: No swelling.    Endocrine: No Hx of diabetes.    ROS is otherwise negative.    PAST MEDICAL & SURGICAL HISTORY:  PAST MEDICAL & SURGICAL HISTORY:  Prostate cancer    S/P appendectomy        Allergies: No Known Allergies      Medications: acetaminophen   Tablet .. 650 milliGRAM(s) Oral every 6 hours PRN  oxyCODONE    IR 5 milliGRAM(s) Oral every 4 hours PRN  oxyCODONE    IR 10 milliGRAM(s) Oral every 4 hours PRN      FAMILY HISTORY:  No pertinent family history in first degree relatives    : non-contributory    Social History: Denies ETOH abuse.    Ambulation: Walking independently [ ] With Cane [ x ] With Walker [ ]  Bedbound [ ]                           11.5   10.23 )-----------( 309      ( 21 Sep 2021 21:28 )             36.6       09-21    140  |  101  |  32.8<H>  ----------------------------<  118<H>  4.2   |  24.0  |  1.21    Ca    9.3      21 Sep 2021 21:28    TPro  7.7  /  Alb  4.2  /  TBili  0.3<L>  /  DBili  x   /  AST  20  /  ALT  12  /  AlkPhos  817<H>  09-21      Vital Signs Last 24 Hrs  T(C): 36.7 (21 Sep 2021 18:41), Max: 36.7 (21 Sep 2021 18:41)  T(F): 98 (21 Sep 2021 18:41), Max: 98 (21 Sep 2021 18:41)  HR: 105 (21 Sep 2021 18:41) (105 - 105)  BP: 149/63 (21 Sep 2021 18:41) (149/63 - 149/63)  BP(mean): --  RR: 20 (21 Sep 2021 18:41) (20 - 20)  SpO2: 98% (21 Sep 2021 18:41) (98% - 98%)    Daily Height in cm: 180.34 (21 Sep 2021 18:41)    Daily       PHYSICAL EXAM:      Appearance: Alert, responsive, in no acute distress.    Neurological: Sensation is grossly intact to light touch. 5/5 motor function of all extremities. No focal deficits or weaknesses found.    Skin: no rash on visible skin. Skin is clean, dry and intact. No bleeding. No abrasions. No ulcerations.    Vascular: 2+ distal pulses. Cap refill < 2 sec. No signs of venous insufficiency or stasis. No extremity ulcerations. No cyanosis.    Musculoskeletal:         Left Upper Extremity:  + NROM. Non-tender. No signs of trauma.        Right Upper Extremity:  + NROM. Non-tender. No signs of trauma.        Left Lower Extremity:  + NROM. Non-tender. No signs of trauma.        Right Lower Extremity: + FROM of the right hip 0-90 degrees. +plantar/dorsiflexion/EHL/FHL intact. Compartments soft and compressible. 2+ DP pulse palpated. No open wounds or active bleeding noted. SILT.    A/P:  Pt is a  71y Male with a non displaced right femoral neck fx.    PLAN d/w Dr. Edmond:  * NPO for OR tomorrow  * MRI R hip - ordered  * IV fluids to start once NPO  * Pre-operative ABX ordered  * Routine daily anticoagulation held for OR  * Admit to trauma  * Discussed case with anesthesia -- states that patient does not need any further medical/cardiac work up for OR clearance  * WADE WILLIAM

## 2021-09-21 NOTE — PHYSICAL EXAM
[de-identified] : The patient appears well nourished  and in no apparent distress.  The patient is alert and oriented to person, place, and time.   Affect and mood appear normal. The head is normocephalic and atraumatic.  The eyes reveal normal sclera and extra ocular muscles are intact. The tongue is midline with no apparent lesions.  Skin shows normal turgor with no evidence of eczema or psoriasis.  No respiratory distress noted.  Sensation grossly intact.		\par  [de-identified] : Exam of the right hip shows hip flexion of 100 degrees, hip external rotation of 40 degrees. There is some pain with external rotation but mild.  Antalgic gait. 5/5 motor strength bilaterally distally. Sensation intact distally.		\par  [de-identified] :  EXAM:  XR HIP WITH PELV 2-3V RT\par PROCEDURE DATE:  07/28/2021\par Impression:\par No acute fracture or dislocation.\par Mild right lateral acetabular spurring.\par Disc height loss at L4-5 with spurring.\par \par X-ray: AP view of the pelvis and 2 views of the right hip demonstrate	minimally displaced femoral neck fracture. This was not seen on the XRay taken on 7/28/2021.\par \par \par \par \par \par

## 2021-09-21 NOTE — ADDENDUM
[FreeTextEntry1] : This note was authored by Slava Nava working as a medical scribe for Dr. Collins Edmond. The note was reviewed, edited, and revised by Dr. Collins Edmond whom is in agreement with the exam findings, imaging findings, and treatment plan. 09/21/2021		\par

## 2021-09-21 NOTE — DISCUSSION/SUMMARY
[de-identified] : JACK UMANZOR is a 71 year male who presents with a minimally displaced femoral neck fracture.  The patient was recently diagnosed with prostate cancer and is undergoing treatment but does not appear to be on chemotherapy or under radiation at this time.  I am recommending surgical fixation of the fracture with percutaneous screw fixation given that he has other medical issues currently being treated.  The alternative would be total hip replacement.  I am going to send him to the emergency room tonight for admission to the hospital.  We will obtain an MRI of the hip preoperatively to ensure that the fracture is not pathologic.  I recommended utilizing a cane and a wheelchair as much as possible as the fracture could completely displace if he bears weight on it.

## 2021-09-22 ENCOUNTER — TRANSCRIPTION ENCOUNTER (OUTPATIENT)
Age: 71
End: 2021-09-22

## 2021-09-22 ENCOUNTER — RESULT REVIEW (OUTPATIENT)
Age: 71
End: 2021-09-22

## 2021-09-22 LAB
ABO RH CONFIRMATION: SIGNIFICANT CHANGE UP
GAS PNL BLDA: SIGNIFICANT CHANGE UP
GLUCOSE BLDC GLUCOMTR-MCNC: 89 MG/DL — SIGNIFICANT CHANGE UP (ref 70–99)
SARS-COV-2 RNA SPEC QL NAA+PROBE: SIGNIFICANT CHANGE UP

## 2021-09-22 PROCEDURE — 99221 1ST HOSP IP/OBS SF/LOW 40: CPT

## 2021-09-22 PROCEDURE — 27236 TREAT THIGH FRACTURE: CPT | Mod: RT

## 2021-09-22 PROCEDURE — 99233 SBSQ HOSP IP/OBS HIGH 50: CPT

## 2021-09-22 PROCEDURE — 88342 IMHCHEM/IMCYTCHM 1ST ANTB: CPT | Mod: 26

## 2021-09-22 PROCEDURE — 88305 TISSUE EXAM BY PATHOLOGIST: CPT | Mod: 26

## 2021-09-22 PROCEDURE — 27236 TREAT THIGH FRACTURE: CPT | Mod: AS,RT

## 2021-09-22 PROCEDURE — 88311 DECALCIFY TISSUE: CPT | Mod: 26

## 2021-09-22 PROCEDURE — 73502 X-RAY EXAM HIP UNI 2-3 VIEWS: CPT | Mod: 26,RT

## 2021-09-22 PROCEDURE — 88341 IMHCHEM/IMCYTCHM EA ADD ANTB: CPT | Mod: 26

## 2021-09-22 RX ORDER — PANTOPRAZOLE SODIUM 20 MG/1
40 TABLET, DELAYED RELEASE ORAL
Refills: 0 | Status: DISCONTINUED | OUTPATIENT
Start: 2021-09-22 | End: 2021-09-24

## 2021-09-22 RX ORDER — CEFAZOLIN SODIUM 1 G
2000 VIAL (EA) INJECTION
Refills: 0 | Status: COMPLETED | OUTPATIENT
Start: 2021-09-22 | End: 2021-09-23

## 2021-09-22 RX ORDER — SODIUM CHLORIDE 9 MG/ML
500 INJECTION INTRAMUSCULAR; INTRAVENOUS; SUBCUTANEOUS ONCE
Refills: 0 | Status: COMPLETED | OUTPATIENT
Start: 2021-09-22 | End: 2021-09-22

## 2021-09-22 RX ORDER — ONDANSETRON 8 MG/1
4 TABLET, FILM COATED ORAL ONCE
Refills: 0 | Status: DISCONTINUED | OUTPATIENT
Start: 2021-09-22 | End: 2021-09-22

## 2021-09-22 RX ORDER — BICALUTAMIDE 50 MG/1
50 TABLET, FILM COATED ORAL DAILY
Refills: 0 | Status: DISCONTINUED | OUTPATIENT
Start: 2021-09-22 | End: 2021-09-24

## 2021-09-22 RX ORDER — HYDROMORPHONE HYDROCHLORIDE 2 MG/ML
0.5 INJECTION INTRAMUSCULAR; INTRAVENOUS; SUBCUTANEOUS
Refills: 0 | Status: DISCONTINUED | OUTPATIENT
Start: 2021-09-22 | End: 2021-09-24

## 2021-09-22 RX ORDER — KETOROLAC TROMETHAMINE 30 MG/ML
15 SYRINGE (ML) INJECTION EVERY 6 HOURS
Refills: 0 | Status: DISCONTINUED | OUTPATIENT
Start: 2021-09-22 | End: 2021-09-23

## 2021-09-22 RX ORDER — MORPHINE SULFATE 50 MG/1
4 CAPSULE, EXTENDED RELEASE ORAL EVERY 4 HOURS
Refills: 0 | Status: DISCONTINUED | OUTPATIENT
Start: 2021-09-22 | End: 2021-09-24

## 2021-09-22 RX ORDER — SODIUM CHLORIDE 9 MG/ML
1000 INJECTION, SOLUTION INTRAVENOUS
Refills: 0 | Status: DISCONTINUED | OUTPATIENT
Start: 2021-09-22 | End: 2021-09-22

## 2021-09-22 RX ORDER — SODIUM CHLORIDE 9 MG/ML
1000 INJECTION INTRAMUSCULAR; INTRAVENOUS; SUBCUTANEOUS
Refills: 0 | Status: DISCONTINUED | OUTPATIENT
Start: 2021-09-22 | End: 2021-09-24

## 2021-09-22 RX ORDER — TAMSULOSIN HYDROCHLORIDE 0.4 MG/1
0.4 CAPSULE ORAL AT BEDTIME
Refills: 0 | Status: DISCONTINUED | OUTPATIENT
Start: 2021-09-22 | End: 2021-09-24

## 2021-09-22 RX ORDER — POLYETHYLENE GLYCOL 3350 17 G/17G
17 POWDER, FOR SOLUTION ORAL AT BEDTIME
Refills: 0 | Status: DISCONTINUED | OUTPATIENT
Start: 2021-09-22 | End: 2021-09-24

## 2021-09-22 RX ORDER — OXYCODONE HYDROCHLORIDE 5 MG/1
5 TABLET ORAL
Refills: 0 | Status: DISCONTINUED | OUTPATIENT
Start: 2021-09-22 | End: 2021-09-24

## 2021-09-22 RX ORDER — CELECOXIB 200 MG/1
200 CAPSULE ORAL EVERY 12 HOURS
Refills: 0 | Status: DISCONTINUED | OUTPATIENT
Start: 2021-09-24 | End: 2021-09-24

## 2021-09-22 RX ORDER — ONDANSETRON 8 MG/1
4 TABLET, FILM COATED ORAL EVERY 6 HOURS
Refills: 0 | Status: DISCONTINUED | OUTPATIENT
Start: 2021-09-22 | End: 2021-09-24

## 2021-09-22 RX ORDER — OXYCODONE HYDROCHLORIDE 5 MG/1
10 TABLET ORAL
Refills: 0 | Status: DISCONTINUED | OUTPATIENT
Start: 2021-09-22 | End: 2021-09-24

## 2021-09-22 RX ORDER — FENTANYL CITRATE 50 UG/ML
50 INJECTION INTRAVENOUS
Refills: 0 | Status: DISCONTINUED | OUTPATIENT
Start: 2021-09-22 | End: 2021-09-22

## 2021-09-22 RX ORDER — ACETAMINOPHEN 500 MG
975 TABLET ORAL EVERY 8 HOURS
Refills: 0 | Status: DISCONTINUED | OUTPATIENT
Start: 2021-09-22 | End: 2021-09-24

## 2021-09-22 RX ORDER — ENOXAPARIN SODIUM 100 MG/ML
40 INJECTION SUBCUTANEOUS DAILY
Refills: 0 | Status: DISCONTINUED | OUTPATIENT
Start: 2021-09-23 | End: 2021-09-24

## 2021-09-22 RX ORDER — SENNA PLUS 8.6 MG/1
2 TABLET ORAL AT BEDTIME
Refills: 0 | Status: DISCONTINUED | OUTPATIENT
Start: 2021-09-22 | End: 2021-09-24

## 2021-09-22 RX ORDER — FINASTERIDE 5 MG/1
5 TABLET, FILM COATED ORAL DAILY
Refills: 0 | Status: DISCONTINUED | OUTPATIENT
Start: 2021-09-22 | End: 2021-09-23

## 2021-09-22 RX ORDER — MAGNESIUM HYDROXIDE 400 MG/1
30 TABLET, CHEWABLE ORAL DAILY
Refills: 0 | Status: DISCONTINUED | OUTPATIENT
Start: 2021-09-22 | End: 2021-09-24

## 2021-09-22 RX ORDER — HYDROMORPHONE HYDROCHLORIDE 2 MG/ML
4 INJECTION INTRAMUSCULAR; INTRAVENOUS; SUBCUTANEOUS
Refills: 0 | Status: DISCONTINUED | OUTPATIENT
Start: 2021-09-22 | End: 2021-09-24

## 2021-09-22 RX ORDER — HYDROMORPHONE HYDROCHLORIDE 2 MG/ML
0.5 INJECTION INTRAMUSCULAR; INTRAVENOUS; SUBCUTANEOUS EVERY 4 HOURS
Refills: 0 | Status: DISCONTINUED | OUTPATIENT
Start: 2021-09-22 | End: 2021-09-24

## 2021-09-22 RX ADMIN — OXYCODONE HYDROCHLORIDE 10 MILLIGRAM(S): 5 TABLET ORAL at 00:48

## 2021-09-22 RX ADMIN — OXYCODONE HYDROCHLORIDE 10 MILLIGRAM(S): 5 TABLET ORAL at 00:18

## 2021-09-22 RX ADMIN — OXYCODONE HYDROCHLORIDE 10 MILLIGRAM(S): 5 TABLET ORAL at 04:25

## 2021-09-22 RX ADMIN — OXYCODONE HYDROCHLORIDE 10 MILLIGRAM(S): 5 TABLET ORAL at 00:55

## 2021-09-22 RX ADMIN — SODIUM CHLORIDE 500 MILLILITER(S): 9 INJECTION INTRAMUSCULAR; INTRAVENOUS; SUBCUTANEOUS at 21:15

## 2021-09-22 RX ADMIN — SODIUM CHLORIDE 85 MILLILITER(S): 9 INJECTION, SOLUTION INTRAVENOUS at 03:00

## 2021-09-22 NOTE — PROGRESS NOTE ADULT - SUBJECTIVE AND OBJECTIVE BOX
Ortho Post Op Check    Name: JACK UMANZOR  MR #: [id number]    Procedure: right hip hemiarthroplasty  Surgeon: morgan    Pt comfortable without complaints, pain controlled  Denies CP, SOB, N/V, numbness/tingling     General Exam:  Vital Signs Last 24 Hrs  T(C): 36.8 (09-22-21 @ 20:35), Max: 36.8 (09-22-21 @ 20:35)  T(F): 98.2 (09-22-21 @ 20:35), Max: 98.2 (09-22-21 @ 20:35)  HR: 86 (09-22-21 @ 21:45) (75 - 94)  BP: 121/67 (09-22-21 @ 21:30) (121/67 - 143/56)  BP(mean): 74 (09-22-21 @ 21:15) (74 - 74)  RR: 13 (09-22-21 @ 21:45) (11 - 19)  SpO2: 98% (09-22-21 @ 21:45) (96% - 99%)    General: Pt Alert and oriented, NAD, controlled pain.  Dressings C/D/I. No bleeding.  Pulses: 2+ dorsalis pedis pulse. Cap refill < 2 sec.  Sensation: Grossly intact to light touch without deficit.  Motor: + EHL/FHL/TA/GS      A/P: 71yMale POD#0 s/p right hip hemiarthroplasty    - Pain Control  - DVT ppx: Lovenox  - Post op abx: as per SCIP  - PT eval pending  - Weight bearing status: WBAT RLE  - posterior precautions reviewed

## 2021-09-22 NOTE — DISCHARGE NOTE PROVIDER - PROVIDER TOKENS
PROVIDER:[TOKEN:[[id number]]] PROVIDER:[TOKEN:[[id number]]],PROVIDER:[TOKEN:[[id number]]]

## 2021-09-22 NOTE — DISCHARGE NOTE PROVIDER - NSDCCPTREATMENT_GEN_ALL_CORE_FT
PRINCIPAL PROCEDURE  Procedure: Total replacement of right hip joint using cement  Findings and Treatment:        PRINCIPAL PROCEDURE  Procedure: Hemiarthroplasty of right hip  Findings and Treatment:

## 2021-09-22 NOTE — DISCHARGE NOTE PROVIDER - NSDCFUADDINST_GEN_ALL_CORE_FT
The patient will be seen in the office between 2-3 weeks for wound check.   **LEASE CONTACT OFFICE TO CONFIRM THE APPOINTMENT DATE.   **  The silver based dressing is to be removed 7 days from the date of surgery (9/29)  ** CONTACT THE OFFICE IF THE FOLLOWING DEVELOP:  - the dressing becomes soiled or saturated  - you develop a fever greater that 101F  - the wound becomes red or you develop blistering around the wound  * Patient may shower after post-op day #3 (9/25).   * The patient will continue home PT consistent with posterior total hip replacement protocol and will continue to practice posterior total hip precautions for a minimum of 6 week. Transition to outpatient PT will occur at the time of the first office visit.   * The patient is FULL weight bearing.  *** The patient will continue LOVENOX for 4 weeks for blood clot prevention.   * The patient will take pain medication for pain control and adjust according to prescription and patient needs. Contact the office if pain increases while taking prescribed pain medications or related concerns develop.  * Celebrex will be taken twice daily for 3 weeks for pain control and prevention of excessive bone growth. Additional prescription may be requested at your office follow-up visit.  * The patient will take Senna S while taking oxycodone to prevent narcotic associated constipation.  Additionally, increase water intake (drink at least 8 glasses of water daily) and try adding fiber to the diet by eating fruits, vegetables and foods that are rich in grains. If constipation is experienced, contact the medical/primary care provider to discuss further treatment options.  * To avoid injury at home:  - continue use of rolling walker until cleared by physical therapist  - have family or friend remove all throw rug or objects in hallways that may present a trip hazard.  - if you experience any dizziness or medical concerns, call your medical doctor or  911.  * The implant may activate metal detection devices.  The patient will be seen in the office between 2-3 weeks for wound check.   **LEASE CONTACT OFFICE TO CONFIRM THE APPOINTMENT DATE.   **  The silver based dressing is to be removed 7 days from the date of surgery (9/29)  ** CONTACT THE OFFICE IF THE FOLLOWING DEVELOP:  - the dressing becomes soiled or saturated  - you develop a fever greater that 101F  - the wound becomes red or you develop blistering around the wound  * Patient may shower after post-op day #3 (9/25).   * The patient will continue home PT consistent with posterior hip replacement protocol and will continue to practice posterior hip precautions for a minimum of 6 week. Transition to outpatient PT will occur at the time of the first office visit.   * The patient is FULL weight bearing.  *** The patient will continue LOVENOX for 4 weeks for blood clot prevention.   * The patient will take pain medication for pain control and adjust according to prescription and patient needs. Contact the office if pain increases while taking prescribed pain medications or related concerns develop.  * Celebrex will be taken twice daily for 3 weeks for pain control and prevention of excessive bone growth. Additional prescription may be requested at your office follow-up visit.  * The patient will take Senna S while taking oxycodone to prevent narcotic associated constipation.  Additionally, increase water intake (drink at least 8 glasses of water daily) and try adding fiber to the diet by eating fruits, vegetables and foods that are rich in grains. If constipation is experienced, contact the medical/primary care provider to discuss further treatment options.  * To avoid injury at home:  - continue use of rolling walker until cleared by physical therapist  - have family or friend remove all throw rug or objects in hallways that may present a trip hazard.  - if you experience any dizziness or medical concerns, call your medical doctor or  911.  * The implant may activate metal detection devices.

## 2021-09-22 NOTE — BRIEF OPERATIVE NOTE - NSICDXBRIEFPREOP_GEN_ALL_CORE_FT
PRE-OP DIAGNOSIS:  Fracture of femoral neck, right 22-Sep-2021 20:21:25  Charles Clements  
PRE-OP DIAGNOSIS:  Fracture of femoral neck, right 22-Sep-2021 20:21:25  Charles Clements

## 2021-09-22 NOTE — DISCHARGE NOTE PROVIDER - NSDCFUSCHEDAPPT_GEN_ALL_CORE_FT
JACK UMANZOR ; 09/23/2021 ; ARP[facility]  JACK UMANZOR ; 09/30/2021 ; HNT PreAdmits JACK UMANZOR ; 09/30/2021 ; T PreAdmits

## 2021-09-22 NOTE — DISCHARGE NOTE PROVIDER - HOSPITAL COURSE
Briefly 71 M presented to the ER after being sent in by his PCP for evaluation of increasing RLE pain. Admitted to [facility] to  71 y Male presenting to the emergency department sent from the office after XRays showed an acute fracture. He reports that ~10 weeks prior he as on his roof when he believes he pulled a muscle. After that he has been having increasing pain in his groin and with ambulation ultimately relying on assistance of a cane. States that he has had an episode of stumbling a few days ago and that prompted the Xrays.    Of note he has been diagnosed with Prostate Ca and was started on Bicalutamide after prostate biopsy. Fracture suspected 2/2 progression of prostate Ca    patient had right hemiarthroplasty performed by ortho on 9/22 and tolerated the procedure well.     time spent ond c 34 minutes Briefly 71 M presented to the ER after being sent in by his PCP for evaluation of increasing RLE pain. Admitted to [facility] to  71 y Male presenting to the emergency department sent from the office after XRays showed an acute fracture. He reports that ~10 weeks prior he as on his roof when he believes he pulled a muscle. After that he has been having increasing pain in his groin and with ambulation ultimately relying on assistance of a cane. States that he has had an episode of stumbling a few days ago and that prompted the Xrays.    Of note he has been diagnosed with Prostate Ca and was started on Bicalutamide after prostate biopsy. Fracture suspected 2/2 progression of prostate Ca    patient had right hemiarthroplasty performed by ortho on 9/22 and tolerated the procedure well.     patient to have bone scan on 9/24 and is now cleared for dc home with home pt    pe  CONSTITUTIONAL: NAD, well-developed,   ENMT: Moist oral mucosa, no pharyngeal injection or exudates; normal dentition  RESPIRATORY: Normal respiratory effort; lungs are clear to auscultation bilaterally  CARDIOVASCULAR: Regular rate and rhythm, normal S1 and S2, no murmur/rub/gallop; No lower extremity edema; Peripheral pulses are 2+ bilaterally  ABDOMEN: Nontender to palpation, normoactive bowel sounds, no rebound/guarding; No hepatosplenomegaly  MUSCLOSKELETAL: right hip surgical site appears clean, no eccyhmosis   PSYCH: A+O to person, place, and time; affect appropriate  NEUROLOGY: CN 2-12 are intact and symmetric; no gross sensory deficits;       time spent ond c 34 minutes Briefly 71 M presented to the ER after being sent in by his PCP for evaluation of increasing RLE pain. Admitted to [facility] to  71 y Male presenting to the emergency department sent from the office after XRays showed an acute fracture. He reports that ~10 weeks prior he as on his roof when he believes he pulled a muscle. After that he has been having increasing pain in his groin and with ambulation ultimately relying on assistance of a cane. States that he has had an episode of stumbling a few days ago and that prompted the Xrays.    Of note he has been diagnosed with Prostate Ca and was started on Bicalutamide after prostate biopsy. Fracture suspected 2/2 progression of prostate Ca    patient had right hemiarthroplasty performed by ortho on 9/22 and tolerated the procedure well.     patient to have bone scan on 9/24 and is now cleared for dc home with home pt    patient will get lovenox 40mg daily for dvt prop, asked nurse to teach patient prior to dc    pe  CONSTITUTIONAL: NAD, well-developed,   ENMT: Moist oral mucosa, no pharyngeal injection or exudates; normal dentition  RESPIRATORY: Normal respiratory effort; lungs are clear to auscultation bilaterally  CARDIOVASCULAR: Regular rate and rhythm, normal S1 and S2, no murmur/rub/gallop; No lower extremity edema; Peripheral pulses are 2+ bilaterally  ABDOMEN: Nontender to palpation, normoactive bowel sounds, no rebound/guarding; No hepatosplenomegaly  MUSCLOSKELETAL: right hip surgical site appears clean, no eccyhmosis   PSYCH: A+O to person, place, and time; affect appropriate  NEUROLOGY: CN 2-12 are intact and symmetric; no gross sensory deficits;       time spent ond c 34 minutes Briefly 71 M presented to the ER after being sent in by his PCP for evaluation of increasing RLE pain. Admitted to [facility] to  71 y Male presenting to the emergency department sent from the office after XRays showed an acute fracture. He reports that ~10 weeks prior he as on his roof when he believes he pulled a muscle. After that he has been having increasing pain in his groin and with ambulation ultimately relying on assistance of a cane. States that he has had an episode of stumbling a few days ago and that prompted the Xrays.    Of note he has been diagnosed with Prostate Ca and was started on Bicalutamide after prostate biopsy. Fracture suspected 2/2 progression of prostate Ca    patient had right hemiarthroplasty performed by ortho on 9/22 and tolerated the procedure well.     patient to have bone scan on 9/24 and is now cleared for dc home with home pt    bone scan  IMPRESSION: Bone scan demonstrates:    Multifocal osseous metastases in the axial and appendicular skeleton, as described.    Lesions in the left femoral neck/proximal femur, and bilateral proximal humeri may place the patient at risk for pathologic fractures.    informed patient     patient will get lovenox 40mg daily for dvt prop, asked nurse to teach patient prior to dc    pe  CONSTITUTIONAL: NAD, well-developed,   ENMT: Moist oral mucosa, no pharyngeal injection or exudates; normal dentition  RESPIRATORY: Normal respiratory effort; lungs are clear to auscultation bilaterally  CARDIOVASCULAR: Regular rate and rhythm, normal S1 and S2, no murmur/rub/gallop; No lower extremity edema; Peripheral pulses are 2+ bilaterally  ABDOMEN: Nontender to palpation, normoactive bowel sounds, no rebound/guarding; No hepatosplenomegaly  MUSCLOSKELETAL: right hip surgical site appears clean, no eccyhmosis   PSYCH: A+O to person, place, and time; affect appropriate  NEUROLOGY: CN 2-12 are intact and symmetric; no gross sensory deficits;       time spent ond c 34 minutes Briefly 71 M presented to the ER after being sent in by his PCP for evaluation of increasing RLE pain. Admitted to [facility] to  71 y Male presenting to the emergency department sent from the office after XRays showed an acute fracture. He reports that ~10 weeks prior he as on his roof when he believes he pulled a muscle. After that he has been having increasing pain in his groin and with ambulation ultimately relying on assistance of a cane. States that he has had an episode of stumbling a few days ago and that prompted the Xrays.    Of note he has been diagnosed with Prostate Ca and was started on Bicalutamide after prostate biopsy. Fracture suspected 2/2 progression of prostate Ca    patient had right hemiarthroplasty performed by ortho on 9/22 and tolerated the procedure well.     patient to have bone scan on 9/24 and is now cleared for dc home with home pt    bone scan  IMPRESSION: Bone scan demonstrates:    Multifocal osseous metastases in the axial and appendicular skeleton, as described.    Lesions in the left femoral neck/proximal femur, and bilateral proximal humeri may place the patient at risk for pathologic fractures.    informed patient of bone scan resulrs and advsied close follow up with onc    patient will get lovenox 40mg daily for dvt prop, asked nurse to teach patient prior to dc    pe  CONSTITUTIONAL: NAD, well-developed,   ENMT: Moist oral mucosa, no pharyngeal injection or exudates; normal dentition  RESPIRATORY: Normal respiratory effort; lungs are clear to auscultation bilaterally  CARDIOVASCULAR: Regular rate and rhythm, normal S1 and S2, no murmur/rub/gallop; No lower extremity edema; Peripheral pulses are 2+ bilaterally  ABDOMEN: Nontender to palpation, normoactive bowel sounds, no rebound/guarding; No hepatosplenomegaly  MUSCLOSKELETAL: right hip surgical site appears clean, no eccyhmosis   PSYCH: A+O to person, place, and time; affect appropriate  NEUROLOGY: CN 2-12 are intact and symmetric; no gross sensory deficits;       time spent ond c 34 minutes

## 2021-09-22 NOTE — DISCHARGE NOTE PROVIDER - NSDCCPCAREPLAN_GEN_ALL_CORE_FT
PRINCIPAL DISCHARGE DIAGNOSIS  Diagnosis: Fracture of femoral neck, right  Assessment and Plan of Treatment:

## 2021-09-22 NOTE — H&P ADULT - HISTORY OF PRESENT ILLNESS
ACUTE CARE SURGERY H&P    HPI:  70 yo female who presented to the ED for a right nondisplaced femoral neck fracture. He had fallen approximately four weeks ago, and was experiencing worsening R hip pain after ambulation. He denies any other pain or symptoms. X-rays done in his PCP's office revealed a right femoral neck fracture.     PAST MEDICAL HISTORY:  Prostate cancer        PAST SURGICAL HISTORY:  S/P appendectomy        ALLERGIES:  No Known Allergies      FAMILY HISTORY: Noncontributory    SOCIAL HISTORY: Denies tobacco, EtOH, illicit substance use.     HOME MEDICATIONS:    MEDICATIONS  (STANDING):  ceFAZolin   IVPB 2000 milliGRAM(s) IV Intermittent once  lactated ringers. 1000 milliLiter(s) (85 mL/Hr) IV Continuous <Continuous>    MEDICATIONS  (PRN):  acetaminophen   Tablet .. 650 milliGRAM(s) Oral every 6 hours PRN Mild Pain (1 - 3)  oxyCODONE    IR 5 milliGRAM(s) Oral every 4 hours PRN Moderate Pain (4 - 6)  oxyCODONE    IR 10 milliGRAM(s) Oral every 4 hours PRN Severe Pain (7 - 10)      VITALS & I/Os:  Vital Signs Last 24 Hrs  T(C): 36.6 (22 Sep 2021 08:10), Max: 36.7 (21 Sep 2021 18:41)  T(F): 97.8 (22 Sep 2021 08:10), Max: 98 (21 Sep 2021 18:41)  HR: 74 (22 Sep 2021 08:10) (74 - 105)  BP: 123/66 (22 Sep 2021 08:10) (123/66 - 157/72)  BP(mean): --  RR: 20 (22 Sep 2021 08:10) (20 - 20)  SpO2: 95% (22 Sep 2021 08:10) (95% - 98%)  CAPILLARY BLOOD GLUCOSE          I&O's Summary      GENERAL: In no acute distress.  RESPIRATORY: Nonlabored on RA.  CARDIOVASCULAR: RRR.    GASTROINTESTINAL: Abdomen soft, NT, ND, -R/-G.    INTEGUMENTARY: No overt rashes or lesions, petechia or purpura. Good turgor. No edema.  MUSCULOSKELETAL: Able to demonstrate normal range of motion on all extremities, including RLE.   VASCULAR: Palpable DP pulses on lower extremities bilaterally    LABS:                        11.5   10.23 )-----------( 309      ( 21 Sep 2021 21:28 )             36.6     09-21    140  |  101  |  32.8<H>  ----------------------------<  118<H>  4.2   |  24.0  |  1.21    Ca    9.3      21 Sep 2021 21:28    TPro  7.7  /  Alb  4.2  /  TBili  0.3<L>  /  DBili  x   /  AST  20  /  ALT  12  /  AlkPhos  817<H>  09-21    Lactate: acetaminophen   Tablet .. 650 milliGRAM(s) Oral every 6 hours PRN  ceFAZolin   IVPB 2000 milliGRAM(s) IV Intermittent once  lactated ringers. 1000 milliLiter(s) IV Continuous <Continuous>  oxyCODONE    IR 5 milliGRAM(s) Oral every 4 hours PRN  oxyCODONE    IR 10 milliGRAM(s) Oral every 4 hours PRN     PT/INR - ( 21 Sep 2021 21:28 )   PT: 14.3 sec;   INR: 1.25 ratio         PTT - ( 21 Sep 2021 21:28 )  PTT:28.1 sec              IMAGING:

## 2021-09-22 NOTE — DISCHARGE NOTE PROVIDER - CARE PROVIDER_API CALL
Collins Edmond)  Orthopaedic Surgery  [address]  [place] [address]  Phone: [phone]  Fax: [phone]  Follow Up Time:    Collins Edmond)  Orthopaedic Surgery  [address]  [place] [address]  Phone: [phone]  Fax: [phone]  Follow Up Time:     Hubert Collins)  Hematology; Internal Medicine; Medical Oncology  [address] [place] [address]  Phone: [phone]  Fax: [phone]  Follow Up Time:

## 2021-09-22 NOTE — BRIEF OPERATIVE NOTE - NSICDXBRIEFPROCEDURE_GEN_ALL_CORE_FT
PROCEDURES:  Hemiarthroplasty of right hip 22-Sep-2021 21:52:36  Charles Clements  
PROCEDURES:  Right total hip replacement 22-Sep-2021 20:21:10  Charles Clements

## 2021-09-22 NOTE — PROGRESS NOTE ADULT - SUBJECTIVE AND OBJECTIVE BOX
Acute Care Surgery/Trauma Surgery Progress Note:    HIP: Patient is a 71y Male presenting to the emergency department sent from the office by Dr. Edmond for right nondisplaced femoral neck fx s/p fall. Pt states that approx 4 weeks ago he had fallen and had been experiencing worsening right hip pain with ambulation since the fall. He states that he had xrays done in Dr. Matthew office that revealed a non displaced right femoral neck fx. Pt otherwise denies fever/chills, c/p, sob, abdominal pain, n/v, numbness/tingling/weakness and has no other complaints at this time.  No acute overnight events. Patient afebrile, VSS. Pain well controlled. Tolerating diet. Denies n/v/f/c/cp/sob.     Dx: right nondisplaced femoral neck fx s/p fall.    Diet, NPO after Midnight:   NPO Start Date: 21-Sep-2021,   NPO Start Time: 23:59  Except Medications (09-21-21 @ 22:00)    Scheduled Medications:   ceFAZolin   IVPB 2000 milliGRAM(s) IV Intermittent once    PRN Medications:  acetaminophen   Tablet .. 650 milliGRAM(s) Oral every 6 hours PRN Mild Pain (1 - 3)  oxyCODONE    IR 5 milliGRAM(s) Oral every 4 hours PRN Moderate Pain (4 - 6)  oxyCODONE    IR 10 milliGRAM(s) Oral every 4 hours PRN Severe Pain (7 - 10)    Objective:   T(F): 98 (09-21 @ 18:41), Max: 98 (09-21 @ 18:41)  HR: 105 (09-21 @ 18:41) (105 - 105)  BP: 149/63 (09-21 @ 18:41) (149/63 - 149/63)  BP(mean): --  ABP: --  ABP(mean): --  RR: 20 (09-21 @ 18:41) (20 - 20)  SpO2: 98% (09-21 @ 18:41) (98% - 98%)    Physical Exam:   GEN: patient resting comfortably in bed, in no acute distress.  RESP: respirations are unlabored, no accessory muscle use, no conversational dyspnea  CVS: RRR  GI: Abdomen soft, non-tender, non-distended, no rebound tenderness / guarding.    LABS:                        11.5   10.23 )-----------( 309      ( 21 Sep 2021 21:28 )             36.6     09-21    140  |  101  |  32.8<H>  ----------------------------<  118<H>  4.2   |  24.0  |  1.21    Ca    9.3      21 Sep 2021 21:28    TPro  7.7  /  Alb  4.2  /  TBili  0.3<L>  /  DBili  x   /  AST  20  /  ALT  12  /  AlkPhos  817<H>  09-21    Thisi    PT/INR - ( 21 Sep 2021 21:28 )   PT: 14.3 sec;   INR: 1.25 ratio         PTT - ( 21 Sep 2021 21:28 )  PTT:28.1 sec    This is a 72 y/o male who experienced a fall from level ground. L right nondisplaced femoral neck fx s/p fall.    Patient is hemodinamically competetn and experienceing no difficulty breathing, afebrile.     Plan:    Consult anethesiology for clearance.  DVT prophylaxis up-hold.  PT/OT  Incentive spirometry.  OOB asap after the surgery, if possible.  type and screen.  Anesthesia consult.

## 2021-09-22 NOTE — DISCHARGE NOTE PROVIDER - CARE PROVIDERS DIRECT ADDRESSES
[email or web address]

## 2021-09-22 NOTE — H&P ADULT - ATTENDING COMMENTS
71M with metastatic prostate cancer to bones and no recent history of trauma.  seen and examined 09-22-21 @ 0900.    no right hip tenderness or overlying ecchymosis    MRI pelvis w/o contrast (9/21/2021) - nondisplaced right subcapital femoral neck fracture with extensive metastatic disease to the bones      metastatic prostate cancer with pathologic fracture of the right hip  -given that this is not a traumatic injury, we will transfer him to the medical service for further care

## 2021-09-22 NOTE — DISCHARGE NOTE PROVIDER - NSDCMRMEDTOKEN_GEN_ALL_CORE_FT
bicalutamide 50 mg oral tablet: 1 tab(s) orally once a day  home pt : 1   pantoprazole 40 mg oral delayed release tablet: 1 tab(s) orally once a day (before a meal)  polyethylene glycol 3350 oral powder for reconstitution: 17 gram(s) orally once a day (at bedtime)  tamsulosin 0.4 mg oral capsule: 1 cap(s) orally once a day (at bedtime)   bicalutamide 50 mg oral tablet: 1 tab(s) orally once a day  home pt : 1   Lovenox 40 mg/0.4 mL injectable solution: 40 milligram(s) subcutaneously once a day   pantoprazole 40 mg oral delayed release tablet: 1 tab(s) orally once a day (before a meal)  polyethylene glycol 3350 oral powder for reconstitution: 17 gram(s) orally once a day (at bedtime)  tamsulosin 0.4 mg oral capsule: 1 cap(s) orally once a day (at bedtime)

## 2021-09-22 NOTE — PROGRESS NOTE ADULT - SUBJECTIVE AND OBJECTIVE BOX
71M hx prostate Ca w r femoral neck fracture. ASA 2. Labs reviewed. No medical or cardiology eval needed from chart review. Pt to OR today.

## 2021-09-22 NOTE — BRIEF OPERATIVE NOTE - NSICDXBRIEFPOSTOP_GEN_ALL_CORE_FT
POST-OP DIAGNOSIS:  Fracture of femoral neck, right 22-Sep-2021 20:21:30  Charles Clements  
POST-OP DIAGNOSIS:  Fracture of femoral neck, right 22-Sep-2021 20:21:30  Charles Clements

## 2021-09-22 NOTE — H&P ADULT - ASSESSMENT
70 yo female who presented to the ED for a right nondisplaced femoral neck fracture. He had fallen approximately four weeks ago, and was experiencing worsening R hip pain after ambulation. He denies any other pain or symptoms. X-rays done in his PCP's office revealed a right femoral neck fracture.     - Admit to Trauma  - OR today for femoral neck fracture repair    Discussed with Dr. Prakash Thornton MD PGY3

## 2021-09-23 ENCOUNTER — APPOINTMENT (OUTPATIENT)
Dept: HEMATOLOGY ONCOLOGY | Facility: CLINIC | Age: 71
End: 2021-09-23

## 2021-09-23 LAB
ALBUMIN SERPL ELPH-MCNC: 3 G/DL — LOW (ref 3.3–5.2)
ALP SERPL-CCNC: 648 U/L — HIGH (ref 40–120)
ALT FLD-CCNC: 9 U/L — SIGNIFICANT CHANGE UP
ANION GAP SERPL CALC-SCNC: 11 MMOL/L — SIGNIFICANT CHANGE UP (ref 5–17)
AST SERPL-CCNC: 22 U/L — SIGNIFICANT CHANGE UP
BILIRUB SERPL-MCNC: 0.4 MG/DL — SIGNIFICANT CHANGE UP (ref 0.4–2)
BUN SERPL-MCNC: 27.2 MG/DL — HIGH (ref 8–20)
CALCIUM SERPL-MCNC: 7.6 MG/DL — LOW (ref 8.6–10.2)
CHLORIDE SERPL-SCNC: 104 MMOL/L — SIGNIFICANT CHANGE UP (ref 98–107)
CO2 SERPL-SCNC: 21 MMOL/L — LOW (ref 22–29)
COVID-19 SPIKE DOMAIN AB INTERP: POSITIVE
COVID-19 SPIKE DOMAIN ANTIBODY RESULT: >250 U/ML — HIGH
CREAT SERPL-MCNC: 1.08 MG/DL — SIGNIFICANT CHANGE UP (ref 0.5–1.3)
GLUCOSE SERPL-MCNC: 180 MG/DL — HIGH (ref 70–99)
HCT VFR BLD CALC: 30.4 % — LOW (ref 39–50)
HCV AB S/CO SERPL IA: 0.07 S/CO — SIGNIFICANT CHANGE UP (ref 0–0.99)
HCV AB SERPL-IMP: SIGNIFICANT CHANGE UP
HGB BLD-MCNC: 10.2 G/DL — LOW (ref 13–17)
MCHC RBC-ENTMCNC: 27.9 PG — SIGNIFICANT CHANGE UP (ref 27–34)
MCHC RBC-ENTMCNC: 33.6 GM/DL — SIGNIFICANT CHANGE UP (ref 32–36)
MCV RBC AUTO: 83.1 FL — SIGNIFICANT CHANGE UP (ref 80–100)
PLATELET # BLD AUTO: 227 K/UL — SIGNIFICANT CHANGE UP (ref 150–400)
POTASSIUM SERPL-MCNC: 4.4 MMOL/L — SIGNIFICANT CHANGE UP (ref 3.5–5.3)
POTASSIUM SERPL-SCNC: 4.4 MMOL/L — SIGNIFICANT CHANGE UP (ref 3.5–5.3)
PROT SERPL-MCNC: 5.8 G/DL — LOW (ref 6.6–8.7)
RBC # BLD: 3.66 M/UL — LOW (ref 4.2–5.8)
RBC # FLD: 13.6 % — SIGNIFICANT CHANGE UP (ref 10.3–14.5)
SARS-COV-2 IGG+IGM SERPL QL IA: >250 U/ML — HIGH
SARS-COV-2 IGG+IGM SERPL QL IA: POSITIVE
SODIUM SERPL-SCNC: 136 MMOL/L — SIGNIFICANT CHANGE UP (ref 135–145)
WBC # BLD: 10.33 K/UL — SIGNIFICANT CHANGE UP (ref 3.8–10.5)
WBC # FLD AUTO: 10.33 K/UL — SIGNIFICANT CHANGE UP (ref 3.8–10.5)

## 2021-09-23 PROCEDURE — 99232 SBSQ HOSP IP/OBS MODERATE 35: CPT

## 2021-09-23 RX ORDER — HYDRALAZINE HCL 50 MG
5 TABLET ORAL EVERY 4 HOURS
Refills: 0 | Status: DISCONTINUED | OUTPATIENT
Start: 2021-09-23 | End: 2021-09-24

## 2021-09-23 RX ADMIN — PANTOPRAZOLE SODIUM 40 MILLIGRAM(S): 20 TABLET, DELAYED RELEASE ORAL at 05:57

## 2021-09-23 RX ADMIN — ENOXAPARIN SODIUM 40 MILLIGRAM(S): 100 INJECTION SUBCUTANEOUS at 13:22

## 2021-09-23 RX ADMIN — OXYCODONE HYDROCHLORIDE 10 MILLIGRAM(S): 5 TABLET ORAL at 13:23

## 2021-09-23 RX ADMIN — Medication 975 MILLIGRAM(S): at 00:03

## 2021-09-23 RX ADMIN — OXYCODONE HYDROCHLORIDE 10 MILLIGRAM(S): 5 TABLET ORAL at 05:53

## 2021-09-23 RX ADMIN — OXYCODONE HYDROCHLORIDE 10 MILLIGRAM(S): 5 TABLET ORAL at 14:15

## 2021-09-23 RX ADMIN — OXYCODONE HYDROCHLORIDE 10 MILLIGRAM(S): 5 TABLET ORAL at 00:45

## 2021-09-23 RX ADMIN — Medication 15 MILLIGRAM(S): at 13:23

## 2021-09-23 RX ADMIN — POLYETHYLENE GLYCOL 3350 17 GRAM(S): 17 POWDER, FOR SOLUTION ORAL at 00:01

## 2021-09-23 RX ADMIN — Medication 15 MILLIGRAM(S): at 00:45

## 2021-09-23 RX ADMIN — POLYETHYLENE GLYCOL 3350 17 GRAM(S): 17 POWDER, FOR SOLUTION ORAL at 21:51

## 2021-09-23 RX ADMIN — SODIUM CHLORIDE 75 MILLILITER(S): 9 INJECTION INTRAMUSCULAR; INTRAVENOUS; SUBCUTANEOUS at 00:02

## 2021-09-23 RX ADMIN — OXYCODONE HYDROCHLORIDE 10 MILLIGRAM(S): 5 TABLET ORAL at 22:30

## 2021-09-23 RX ADMIN — Medication 15 MILLIGRAM(S): at 00:30

## 2021-09-23 RX ADMIN — OXYCODONE HYDROCHLORIDE 10 MILLIGRAM(S): 5 TABLET ORAL at 06:30

## 2021-09-23 RX ADMIN — Medication 975 MILLIGRAM(S): at 13:22

## 2021-09-23 RX ADMIN — SENNA PLUS 2 TABLET(S): 8.6 TABLET ORAL at 21:50

## 2021-09-23 RX ADMIN — Medication 15 MILLIGRAM(S): at 14:21

## 2021-09-23 RX ADMIN — Medication 15 MILLIGRAM(S): at 06:30

## 2021-09-23 RX ADMIN — Medication 975 MILLIGRAM(S): at 06:30

## 2021-09-23 RX ADMIN — Medication 15 MILLIGRAM(S): at 05:50

## 2021-09-23 RX ADMIN — OXYCODONE HYDROCHLORIDE 10 MILLIGRAM(S): 5 TABLET ORAL at 21:50

## 2021-09-23 RX ADMIN — Medication 975 MILLIGRAM(S): at 22:30

## 2021-09-23 RX ADMIN — Medication 100 MILLIGRAM(S): at 00:02

## 2021-09-23 RX ADMIN — Medication 975 MILLIGRAM(S): at 21:50

## 2021-09-23 RX ADMIN — TAMSULOSIN HYDROCHLORIDE 0.4 MILLIGRAM(S): 0.4 CAPSULE ORAL at 21:51

## 2021-09-23 RX ADMIN — Medication 100 MILLIGRAM(S): at 09:59

## 2021-09-23 RX ADMIN — Medication 975 MILLIGRAM(S): at 00:45

## 2021-09-23 RX ADMIN — Medication 975 MILLIGRAM(S): at 14:21

## 2021-09-23 RX ADMIN — TAMSULOSIN HYDROCHLORIDE 0.4 MILLIGRAM(S): 0.4 CAPSULE ORAL at 00:03

## 2021-09-23 RX ADMIN — Medication 975 MILLIGRAM(S): at 05:51

## 2021-09-23 RX ADMIN — SENNA PLUS 2 TABLET(S): 8.6 TABLET ORAL at 00:02

## 2021-09-23 RX ADMIN — OXYCODONE HYDROCHLORIDE 10 MILLIGRAM(S): 5 TABLET ORAL at 00:04

## 2021-09-23 NOTE — PHYSICAL THERAPY INITIAL EVALUATION ADULT - CRITERIA FOR SKILLED THERAPEUTIC INTERVENTIONS
Telephone Encounter by Korina Hutchinson LPN at 03/26/18 03:17 PM     Author:  Korina Hutchinson LPN Service:  (none) Author Type:  Registered Nurse     Filed:  03/26/18 03:17 PM Encounter Date:  2/28/2018 Status:  Signed     :  Korina Hutchinson LPN (Registered Nurse)       From: Jovon Ulloa  Sent: 3/16/2018 10:22 PM CDT  To: [facility] Nurse Pool  Subject: RE:Neurologist    Korina, thanks for the note. Sorry, I don't check this mail all the time.  Dr. Savage looks like a good place to start.  They'll contact me once it is sent to them?  If so, my cell is . I'll check this more often  as well.   thanks,  Jovon  ----- Message -----  From: Korina DRAPER  Sent: 2/28/2018 10:46 AM CST  To: Jovon Ulloa  Subject: Neurologist    Jerry Ulloa,     This is the nurse in Neurology, sending you another message as I think the   other was closed so you could choose a Neurologist.     1)  Dr.Rodrigo Dyer   [address], [place]   Phone: [phone]     2) Dr.Anthony Savage   [facility]   [address] [place] [address]   [address]   [place], [place] [address] [place] [address]   Phone: [phone]     3) [facility]   Dr.Mohamed High   [address]   [address]   [place] [address]   Phone: [phone]     4) [facility]   Dr.Syed Harris   [address] [place] [address] [address]   [place], [place] [address]   Phone: [phone]     5) Dr.Muhammed Prasad   [place] Office:  [address] [place], [place] [address]   Phone: [phone]    [place] Office:  [address] [place] [address]  Phone: [phone]    Please let me know who you choose and then Neurology can fax orders to   your chosen Neurologist in order for you to be seen.  Korina Hutchinson LPN   Neurology        Revision History        Date/Time User Provider Type Action    > 03/26/18 03:17 PM Korina Hutchinson LPN Registered Nurse Sign    Attribution information within the note text is  not available.             impairments found

## 2021-09-23 NOTE — OCCUPATIONAL THERAPY INITIAL EVALUATION ADULT - FINE MOTOR COORDINATION, LEFT HAND, MANIPULATION OF OBJECTS, OT EVAL
Addended by: DUTCH MANCINI on: 1/19/2018 11:54 AM     Modules accepted: Orders    
normal performance

## 2021-09-23 NOTE — PROGRESS NOTE ADULT - SUBJECTIVE AND OBJECTIVE BOX
JACK UMANZOR    [id number]    History: Patient is status post right hip hemiarthroplasty, POD # 1. Patient is doing well. The patient's pain is controlled using the prescribed pain medications. The patient is participating in physical therapy. Denies nausea, vomiting, chest pain, shortness of breath, abdominal pain or fever. No new complaints.                        10.2   10.33 )-----------( 227      ( 23 Sep 2021 06:12 )             30.4     09-23    136  |  104  |  27.2<H>  ----------------------------<  180<H>  4.4   |  21.0<L>  |  1.08    Ca    7.6<L>      23 Sep 2021 06:12    TPro  5.8<L>  /  Alb  3.0<L>  /  TBili  0.4  /  DBili  x   /  AST  22  /  ALT  9   /  AlkPhos  648<H>  09-23    Vital Signs Last 24 Hrs  T(C): 36.8 (23 Sep 2021 04:33), Max: 37.2 (22 Sep 2021 14:29)  T(F): 98.2 (23 Sep 2021 04:33), Max: 98.9 (22 Sep 2021 14:29)  HR: 96 (23 Sep 2021 04:33) (74 - 101)  BP: 124/69 (23 Sep 2021 04:33) (121/67 - 155/86)  BP(mean): 74 (22 Sep 2021 21:15) (74 - 74)  RR: 18 (23 Sep 2021 04:33) (11 - 20)  SpO2: 93% (23 Sep 2021 04:33) (93% - 100%)    MEDICATIONS  (STANDING):  acetaminophen   Tablet .. 975 milliGRAM(s) Oral every 8 hours  bicalutamide 50 milliGRAM(s) Oral daily  ceFAZolin   IVPB 2000 milliGRAM(s) IV Intermittent <User Schedule>  enoxaparin Injectable 40 milliGRAM(s) SubCutaneous daily  finasteride 5 milliGRAM(s) Oral daily  ketorolac   Injectable 15 milliGRAM(s) IV Push every 6 hours  pantoprazole    Tablet 40 milliGRAM(s) Oral before breakfast  polyethylene glycol 3350 17 Gram(s) Oral at bedtime  senna 2 Tablet(s) Oral at bedtime  sodium chloride 0.9%. 1000 milliLiter(s) (75 mL/Hr) IV Continuous <Continuous>  tamsulosin 0.4 milliGRAM(s) Oral at bedtime    MEDICATIONS  (PRN):  aluminum hydroxide/magnesium hydroxide/simethicone Suspension 30 milliLiter(s) Oral four times a day PRN Indigestion  hydrALAZINE Injectable 5 milliGRAM(s) IV Push every 4 hours PRN for sbp above 160 mmhg  HYDROmorphone   Tablet 4 milliGRAM(s) Oral every 3 hours PRN Severe Pain (7 - 10)  HYDROmorphone  Injectable 0.5 milliGRAM(s) IV Push every 4 hours PRN breakthrough  HYDROmorphone  Injectable 0.5 milliGRAM(s) IV Push every 3 hours PRN Severe Pain (7 - 10)  magnesium hydroxide Suspension 30 milliLiter(s) Oral daily PRN Constipation  morphine  - Injectable 4 milliGRAM(s) IV Push every 4 hours PRN Moderate Pain (4 - 6)  ondansetron Injectable 4 milliGRAM(s) IV Push every 6 hours PRN Nausea and/or Vomiting  oxyCODONE    IR 5 milliGRAM(s) Oral every 3 hours PRN Mild Pain (1 - 3)  oxyCODONE    IR 10 milliGRAM(s) Oral every 3 hours PRN Moderate Pain (4 - 6)    Physical exam: The right hip dressing is clean, dry and intact. No drainage or discharge. No erythema is noted. No blistering. No ecchymosis. Abduction pillow in place. The calf is supple nontender. Passive range of motion is acceptable to due postoperative pain. Sensation to light touch is grossly intact distally. Motor function distally is 5/5. No foot drop. 2+ dorsalis pedis pulse. Capillary refill is less than 2 seconds. No cyanosis.    Primary Orthopedic Assessment:  • s/p RIGHT hip hemiarthroplasty    Secondary  Medical Assessment(s):   • prostate CA    Plan:   • DVT prophylaxis as prescribed, including use of compression devices and ankle pumps  • Continue physical therapy  • Weightbearing as tolerated of the right lower extremity with assistance of a walker  • Incentive spirometry encouraged  • Pain control as clinically indicated  • Posterior hip precautions   • Discharge planning – anticipated discharge is Home when cleared by PT and Medicine

## 2021-09-23 NOTE — OCCUPATIONAL THERAPY INITIAL EVALUATION ADULT - GENERAL OBSERVATIONS, REHAB EVAL
Pt received semifowler in bed, +bilateral VCDs, +hip abduction pillow, +IV, +isaacs, agreeable to OT eval.

## 2021-09-23 NOTE — OCCUPATIONAL THERAPY INITIAL EVALUATION ADULT - PERTINENT HX OF CURRENT PROBLEM, REHAB EVAL
s/p right hip hemiarthroplasty.~10 weeks ago he as on his roof when he believes he pulled a muscle. After that he has been having increasing pain in his groin and with ambulation ultimately relying on assistance of a cane.

## 2021-09-23 NOTE — PROGRESS NOTE ADULT - SUBJECTIVE AND OBJECTIVE BOX
JACK UMANZOR    [id number]    71y      Male    INTERVAL HPI/OVERNIGHT EVENTS: patient is sp hip repair. patient seen at bedside and states pain is bearable.       REVIEW OF SYSTEMS:    CONSTITUTIONAL: No fever, weight loss, or fatigue  RESPIRATORY: No cough, wheezing, hemoptysis; No shortness of breath  CARDIOVASCULAR: No chest pain, palpitations  GASTROINTESTINAL: No abdominal or epigastric pain. No nausea, vomiting  NEUROLOGICAL: No headaches, memory loss, loss of strength.  MISCELLANEOUS:      Vital Signs Last 24 Hrs  T(C): 36.8 (23 Sep 2021 09:23), Max: 37.2 (22 Sep 2021 14:29)  T(F): 98.3 (23 Sep 2021 09:23), Max: 98.9 (22 Sep 2021 14:29)  HR: 89 (23 Sep 2021 09:23) (75 - 101)  BP: 115/55 (23 Sep 2021 09:23) (115/55 - 155/86)  BP(mean): 74 (22 Sep 2021 21:15) (74 - 74)  RR: 18 (23 Sep 2021 09:23) (11 - 20)  SpO2: 95% (23 Sep 2021 09:23) (93% - 100%)    PHYSICAL EXAM:    CONSTITUTIONAL: NAD, well-developed,   ENMT: Moist oral mucosa, no pharyngeal injection or exudates; normal dentition  RESPIRATORY: Normal respiratory effort; lungs are clear to auscultation bilaterally  CARDIOVASCULAR: Regular rate and rhythm, normal S1 and S2, no murmur/rub/gallop; No lower extremity edema; Peripheral pulses are 2+ bilaterally  ABDOMEN: Nontender to palpation, normoactive bowel sounds, no rebound/guarding; No hepatosplenomegaly  MUSCLOSKELETAL: right hip surgical site appears clean, no eccyhmosis   PSYCH: A+O to person, place, and time; affect appropriate  NEUROLOGY: CN 2-12 are intact and symmetric; no gross sensory deficits;     LABS:                        10.2   10.33 )-----------( 227      ( 23 Sep 2021 06:12 )             30.4     09-23    136  |  104  |  27.2<H>  ----------------------------<  180<H>  4.4   |  21.0<L>  |  1.08    Ca    7.6<L>      23 Sep 2021 06:12    TPro  5.8<L>  /  Alb  3.0<L>  /  TBili  0.4  /  DBili  x   /  AST  22  /  ALT  9   /  AlkPhos  648<H>  09-23    PT/INR - ( 21 Sep 2021 21:28 )   PT: 14.3 sec;   INR: 1.25 ratio         PTT - ( 21 Sep 2021 21:28 )  PTT:28.1 sec        MEDICATIONS  (STANDING):  acetaminophen   Tablet .. 975 milliGRAM(s) Oral every 8 hours  bicalutamide 50 milliGRAM(s) Oral daily  enoxaparin Injectable 40 milliGRAM(s) SubCutaneous daily  finasteride 5 milliGRAM(s) Oral daily  ketorolac   Injectable 15 milliGRAM(s) IV Push every 6 hours  pantoprazole    Tablet 40 milliGRAM(s) Oral before breakfast  polyethylene glycol 3350 17 Gram(s) Oral at bedtime  senna 2 Tablet(s) Oral at bedtime  sodium chloride 0.9%. 1000 milliLiter(s) (75 mL/Hr) IV Continuous <Continuous>  tamsulosin 0.4 milliGRAM(s) Oral at bedtime    MEDICATIONS  (PRN):  aluminum hydroxide/magnesium hydroxide/simethicone Suspension 30 milliLiter(s) Oral four times a day PRN Indigestion  hydrALAZINE Injectable 5 milliGRAM(s) IV Push every 4 hours PRN for sbp above 160 mmhg  HYDROmorphone   Tablet 4 milliGRAM(s) Oral every 3 hours PRN Severe Pain (7 - 10)  HYDROmorphone  Injectable 0.5 milliGRAM(s) IV Push every 4 hours PRN breakthrough  HYDROmorphone  Injectable 0.5 milliGRAM(s) IV Push every 3 hours PRN Severe Pain (7 - 10)  magnesium hydroxide Suspension 30 milliLiter(s) Oral daily PRN Constipation  morphine  - Injectable 4 milliGRAM(s) IV Push every 4 hours PRN Moderate Pain (4 - 6)  ondansetron Injectable 4 milliGRAM(s) IV Push every 6 hours PRN Nausea and/or Vomiting  oxyCODONE    IR 5 milliGRAM(s) Oral every 3 hours PRN Mild Pain (1 - 3)  oxyCODONE    IR 10 milliGRAM(s) Oral every 3 hours PRN Moderate Pain (4 - 6)      RADIOLOGY & ADDITIONAL TESTS:

## 2021-09-23 NOTE — PHYSICAL THERAPY INITIAL EVALUATION ADULT - PERTINENT HX OF CURRENT PROBLEM, REHAB EVAL
s/p right hip hemiarthroplasty; 71 year old male PMHx prostate cancer (treatment in progress), appendectomy presents to ED sent in by Dr. Newberry. Patient s/p fall with right femoral neck fracture. Patient with fall 4 weeks ago, went to doctor today for pain. Patient has been ambulating with cane.

## 2021-09-23 NOTE — OCCUPATIONAL THERAPY INITIAL EVALUATION ADULT - ADDITIONAL COMMENTS
Pt lives in private home with 4-5 steps to enter, 1 HR.  Once inside there is a full flight to bedroom and full bath.  No HR but plans to have one installed.  He can stay on main level until installed if needed (with 1/2 bath).  Pt drives.  Pt already has SAC.  He may borrow a RW from neighbor if needed but not clear if he will have for discharge.

## 2021-09-23 NOTE — OCCUPATIONAL THERAPY INITIAL EVALUATION ADULT - LOWER BODY DRESSING, ASSISTIVE DEVICE, OT EVAL
pt briefly educated on hip kit devices or need for assist to maintain THPs.  Pt verbalizes understanding.  Pt needs further instruction on use of devices.

## 2021-09-23 NOTE — PHYSICAL THERAPY INITIAL EVALUATION ADULT - ADDITIONAL COMMENTS
Pt. lives alone with 4 EROS with one rail and 12 inside with one rail, but is able to stay on the first floor. Pt. able to stay on 1st floor if necessary. Pt. was using a SAC PTA and does not have a RW but could borrow one. Pt. has friends and neighbors he can have assist him.

## 2021-09-23 NOTE — OCCUPATIONAL THERAPY INITIAL EVALUATION ADULT - HOME MANAGEMENT SKILLS, PREVIOUS LEVEL OF FUNCTION, OT EVAL
Pt typically independent with cooking, shopping, laundry, light cleaning.  Neighbors and family have been assisting him with these tasks past few weeks due to progressive pain.

## 2021-09-23 NOTE — OCCUPATIONAL THERAPY INITIAL EVALUATION ADULT - NS ASR OT EQUIP NEEDS DISCH
Pt needs RW, keith, hip kit.  Resources for hip kit discussed and pt will obtain on his own.  Grab bar in shower also recommended for safety.

## 2021-09-24 ENCOUNTER — TRANSCRIPTION ENCOUNTER (OUTPATIENT)
Age: 71
End: 2021-09-24

## 2021-09-24 VITALS
OXYGEN SATURATION: 95 % | SYSTOLIC BLOOD PRESSURE: 142 MMHG | TEMPERATURE: 98 F | HEART RATE: 88 BPM | DIASTOLIC BLOOD PRESSURE: 82 MMHG | RESPIRATION RATE: 18 BRPM

## 2021-09-24 LAB
ALBUMIN SERPL ELPH-MCNC: 2.9 G/DL — LOW (ref 3.3–5.2)
ALP SERPL-CCNC: 596 U/L — HIGH (ref 40–120)
ALT FLD-CCNC: <5 U/L — SIGNIFICANT CHANGE UP
ANION GAP SERPL CALC-SCNC: 7 MMOL/L — SIGNIFICANT CHANGE UP (ref 5–17)
AST SERPL-CCNC: 17 U/L — SIGNIFICANT CHANGE UP
BASOPHILS # BLD AUTO: 0.07 K/UL — SIGNIFICANT CHANGE UP (ref 0–0.2)
BASOPHILS NFR BLD AUTO: 0.9 % — SIGNIFICANT CHANGE UP (ref 0–2)
BILIRUB SERPL-MCNC: <0.2 MG/DL — LOW (ref 0.4–2)
BUN SERPL-MCNC: 32.5 MG/DL — HIGH (ref 8–20)
CALCIUM SERPL-MCNC: 7.8 MG/DL — LOW (ref 8.6–10.2)
CHLORIDE SERPL-SCNC: 108 MMOL/L — HIGH (ref 98–107)
CO2 SERPL-SCNC: 25 MMOL/L — SIGNIFICANT CHANGE UP (ref 22–29)
CREAT SERPL-MCNC: 1.24 MG/DL — SIGNIFICANT CHANGE UP (ref 0.5–1.3)
EOSINOPHIL # BLD AUTO: 0.22 K/UL — SIGNIFICANT CHANGE UP (ref 0–0.5)
EOSINOPHIL NFR BLD AUTO: 2.8 % — SIGNIFICANT CHANGE UP (ref 0–6)
GLUCOSE SERPL-MCNC: 116 MG/DL — HIGH (ref 70–99)
HCT VFR BLD CALC: 27 % — LOW (ref 39–50)
HGB BLD-MCNC: 8.8 G/DL — LOW (ref 13–17)
IMM GRANULOCYTES NFR BLD AUTO: 0.5 % — SIGNIFICANT CHANGE UP (ref 0–1.5)
LYMPHOCYTES # BLD AUTO: 2.1 K/UL — SIGNIFICANT CHANGE UP (ref 1–3.3)
LYMPHOCYTES # BLD AUTO: 26.5 % — SIGNIFICANT CHANGE UP (ref 13–44)
MAGNESIUM SERPL-MCNC: 2.2 MG/DL — SIGNIFICANT CHANGE UP (ref 1.8–2.6)
MCHC RBC-ENTMCNC: 28.1 PG — SIGNIFICANT CHANGE UP (ref 27–34)
MCHC RBC-ENTMCNC: 32.6 GM/DL — SIGNIFICANT CHANGE UP (ref 32–36)
MCV RBC AUTO: 86.3 FL — SIGNIFICANT CHANGE UP (ref 80–100)
MONOCYTES # BLD AUTO: 0.69 K/UL — SIGNIFICANT CHANGE UP (ref 0–0.9)
MONOCYTES NFR BLD AUTO: 8.7 % — SIGNIFICANT CHANGE UP (ref 2–14)
NEUTROPHILS # BLD AUTO: 4.81 K/UL — SIGNIFICANT CHANGE UP (ref 1.8–7.4)
NEUTROPHILS NFR BLD AUTO: 60.6 % — SIGNIFICANT CHANGE UP (ref 43–77)
PLATELET # BLD AUTO: 189 K/UL — SIGNIFICANT CHANGE UP (ref 150–400)
POTASSIUM SERPL-MCNC: 4.3 MMOL/L — SIGNIFICANT CHANGE UP (ref 3.5–5.3)
POTASSIUM SERPL-SCNC: 4.3 MMOL/L — SIGNIFICANT CHANGE UP (ref 3.5–5.3)
PROT SERPL-MCNC: 5.3 G/DL — LOW (ref 6.6–8.7)
RBC # BLD: 3.13 M/UL — LOW (ref 4.2–5.8)
RBC # FLD: 14.3 % — SIGNIFICANT CHANGE UP (ref 10.3–14.5)
SODIUM SERPL-SCNC: 140 MMOL/L — SIGNIFICANT CHANGE UP (ref 135–145)
WBC # BLD: 7.93 K/UL — SIGNIFICANT CHANGE UP (ref 3.8–10.5)
WBC # FLD AUTO: 7.93 K/UL — SIGNIFICANT CHANGE UP (ref 3.8–10.5)

## 2021-09-24 PROCEDURE — 88341 IMHCHEM/IMCYTCHM EA ADD ANTB: CPT

## 2021-09-24 PROCEDURE — 78306 BONE IMAGING WHOLE BODY: CPT | Mod: 26

## 2021-09-24 PROCEDURE — 97167 OT EVAL HIGH COMPLEX 60 MIN: CPT

## 2021-09-24 PROCEDURE — 73502 X-RAY EXAM HIP UNI 2-3 VIEWS: CPT

## 2021-09-24 PROCEDURE — 99285 EMERGENCY DEPT VISIT HI MDM: CPT | Mod: 25

## 2021-09-24 PROCEDURE — 36415 COLL VENOUS BLD VENIPUNCTURE: CPT

## 2021-09-24 PROCEDURE — 82803 BLOOD GASES ANY COMBINATION: CPT

## 2021-09-24 PROCEDURE — 85025 COMPLETE CBC W/AUTO DIFF WBC: CPT

## 2021-09-24 PROCEDURE — 88305 TISSUE EXAM BY PATHOLOGIST: CPT

## 2021-09-24 PROCEDURE — 85730 THROMBOPLASTIN TIME PARTIAL: CPT

## 2021-09-24 PROCEDURE — 82435 ASSAY OF BLOOD CHLORIDE: CPT

## 2021-09-24 PROCEDURE — 85018 HEMOGLOBIN: CPT

## 2021-09-24 PROCEDURE — 88342 IMHCHEM/IMCYTCHM 1ST ANTB: CPT

## 2021-09-24 PROCEDURE — 85610 PROTHROMBIN TIME: CPT

## 2021-09-24 PROCEDURE — 88311 DECALCIFY TISSUE: CPT

## 2021-09-24 PROCEDURE — U0005: CPT

## 2021-09-24 PROCEDURE — 72195 MRI PELVIS W/O DYE: CPT

## 2021-09-24 PROCEDURE — 83605 ASSAY OF LACTIC ACID: CPT

## 2021-09-24 PROCEDURE — 82330 ASSAY OF CALCIUM: CPT

## 2021-09-24 PROCEDURE — P9016: CPT

## 2021-09-24 PROCEDURE — 82947 ASSAY GLUCOSE BLOOD QUANT: CPT

## 2021-09-24 PROCEDURE — U0003: CPT

## 2021-09-24 PROCEDURE — 99239 HOSP IP/OBS DSCHRG MGMT >30: CPT

## 2021-09-24 PROCEDURE — 84295 ASSAY OF SERUM SODIUM: CPT

## 2021-09-24 PROCEDURE — 80053 COMPREHEN METABOLIC PANEL: CPT

## 2021-09-24 PROCEDURE — 86901 BLOOD TYPING SEROLOGIC RH(D): CPT

## 2021-09-24 PROCEDURE — 86769 SARS-COV-2 COVID-19 ANTIBODY: CPT

## 2021-09-24 PROCEDURE — 82962 GLUCOSE BLOOD TEST: CPT

## 2021-09-24 PROCEDURE — 71045 X-RAY EXAM CHEST 1 VIEW: CPT

## 2021-09-24 PROCEDURE — 86803 HEPATITIS C AB TEST: CPT

## 2021-09-24 PROCEDURE — 93005 ELECTROCARDIOGRAM TRACING: CPT

## 2021-09-24 PROCEDURE — 83735 ASSAY OF MAGNESIUM: CPT

## 2021-09-24 PROCEDURE — 36430 TRANSFUSION BLD/BLD COMPNT: CPT

## 2021-09-24 PROCEDURE — C1713: CPT

## 2021-09-24 PROCEDURE — C1776: CPT

## 2021-09-24 PROCEDURE — 85027 COMPLETE CBC AUTOMATED: CPT

## 2021-09-24 PROCEDURE — 86923 COMPATIBILITY TEST ELECTRIC: CPT

## 2021-09-24 PROCEDURE — 78306 BONE IMAGING WHOLE BODY: CPT

## 2021-09-24 PROCEDURE — 86900 BLOOD TYPING SEROLOGIC ABO: CPT

## 2021-09-24 PROCEDURE — A9561: CPT

## 2021-09-24 PROCEDURE — 85014 HEMATOCRIT: CPT

## 2021-09-24 PROCEDURE — 84132 ASSAY OF SERUM POTASSIUM: CPT

## 2021-09-24 PROCEDURE — 86850 RBC ANTIBODY SCREEN: CPT

## 2021-09-24 RX ORDER — ENOXAPARIN SODIUM 100 MG/ML
40 INJECTION SUBCUTANEOUS
Qty: 26 | Refills: 0
Start: 2021-09-24 | End: 2021-10-19

## 2021-09-24 RX ORDER — TAMSULOSIN HYDROCHLORIDE 0.4 MG/1
1 CAPSULE ORAL
Qty: 0 | Refills: 0 | DISCHARGE
Start: 2021-09-24

## 2021-09-24 RX ORDER — POLYETHYLENE GLYCOL 3350 17 G/17G
17 POWDER, FOR SOLUTION ORAL
Qty: 510 | Refills: 0
Start: 2021-09-24 | End: 2021-10-23

## 2021-09-24 RX ORDER — BICALUTAMIDE 50 MG/1
1 TABLET, FILM COATED ORAL
Qty: 0 | Refills: 0 | DISCHARGE
Start: 2021-09-24

## 2021-09-24 RX ORDER — PANTOPRAZOLE SODIUM 20 MG/1
1 TABLET, DELAYED RELEASE ORAL
Qty: 30 | Refills: 0
Start: 2021-09-24 | End: 2021-10-23

## 2021-09-24 RX ADMIN — PANTOPRAZOLE SODIUM 40 MILLIGRAM(S): 20 TABLET, DELAYED RELEASE ORAL at 06:06

## 2021-09-24 RX ADMIN — ENOXAPARIN SODIUM 40 MILLIGRAM(S): 100 INJECTION SUBCUTANEOUS at 11:56

## 2021-09-24 RX ADMIN — CELECOXIB 200 MILLIGRAM(S): 200 CAPSULE ORAL at 06:05

## 2021-09-24 RX ADMIN — OXYCODONE HYDROCHLORIDE 10 MILLIGRAM(S): 5 TABLET ORAL at 06:30

## 2021-09-24 RX ADMIN — Medication 975 MILLIGRAM(S): at 06:04

## 2021-09-24 RX ADMIN — CELECOXIB 200 MILLIGRAM(S): 200 CAPSULE ORAL at 06:45

## 2021-09-24 RX ADMIN — OXYCODONE HYDROCHLORIDE 10 MILLIGRAM(S): 5 TABLET ORAL at 06:05

## 2021-09-24 RX ADMIN — Medication 975 MILLIGRAM(S): at 06:45

## 2021-09-24 NOTE — PROGRESS NOTE ADULT - REASON FOR ADMISSION
Femoral neck fracture
Hip fracture
Femoral neck fracture

## 2021-09-24 NOTE — PROGRESS NOTE ADULT - SUBJECTIVE AND OBJECTIVE BOX
Patient seen and examined at bedside. comfortable in bed, pain controlled. Denies fever/chills, SOB/chest pain, abdominal pain, numbness/tingling. no complaints.    Vital Signs Last 24 Hrs  T(C): 37 (24 Sep 2021 05:00), Max: 37 (24 Sep 2021 05:00)  T(F): 98.6 (24 Sep 2021 05:00), Max: 98.6 (24 Sep 2021 05:00)  HR: 84 (24 Sep 2021 05:00) (84 - 97)  BP: 131/74 (24 Sep 2021 05:00) (97/43 - 148/83)  BP(mean): --  RR: 18 (24 Sep 2021 05:00) (16 - 18)  SpO2: 95% (24 Sep 2021 05:00) (93% - 95%)    RLE: Mepilex dressing C/D/I. SILT. + dorsi/plantarflexion. DP 2+. calf soft NT B/L.                          8.8    7.93  )-----------( 189      ( 24 Sep 2021 06:54 )             27.0     A/P: 71 y.o M s/p right hip farzad POD #2  - WBAT, posterior hip precautions  - DVTP  - cont care primary team

## 2021-09-24 NOTE — DISCHARGE NOTE NURSING/CASE MANAGEMENT/SOCIAL WORK - PATIENT PORTAL LINK FT
You can access the FollowMyHealth Patient Portal offered by [facility] by registering at the following website: [email or web address]. By joining [facility]’s FollowMyHealth portal, you will also be able to view your health information using other applications (apps) compatible with our system.

## 2021-09-25 LAB — SURGICAL PATHOLOGY STUDY: SIGNIFICANT CHANGE UP

## 2021-09-27 PROBLEM — C61 MALIGNANT NEOPLASM OF PROSTATE: Chronic | Status: ACTIVE | Noted: 2021-09-21

## 2021-09-29 ENCOUNTER — RESULT REVIEW (OUTPATIENT)
Age: 71
End: 2021-09-29

## 2021-09-29 ENCOUNTER — NON-APPOINTMENT (OUTPATIENT)
Age: 71
End: 2021-09-29

## 2021-09-29 ENCOUNTER — APPOINTMENT (OUTPATIENT)
Dept: HEMATOLOGY ONCOLOGY | Facility: CLINIC | Age: 71
End: 2021-09-29
Payer: MEDICARE

## 2021-09-29 VITALS
HEIGHT: 70 IN | DIASTOLIC BLOOD PRESSURE: 74 MMHG | SYSTOLIC BLOOD PRESSURE: 124 MMHG | BODY MASS INDEX: 23.77 KG/M2 | WEIGHT: 166 LBS | HEART RATE: 109 BPM | OXYGEN SATURATION: 94 %

## 2021-09-29 LAB
BASOPHILS # BLD AUTO: 0.1 K/UL — SIGNIFICANT CHANGE UP (ref 0–0.2)
BASOPHILS NFR BLD AUTO: 1.2 % — SIGNIFICANT CHANGE UP (ref 0–2)
EOSINOPHIL # BLD AUTO: 0.2 K/UL — SIGNIFICANT CHANGE UP (ref 0–0.5)
EOSINOPHIL NFR BLD AUTO: 2.2 % — SIGNIFICANT CHANGE UP (ref 0–6)
HCT VFR BLD CALC: 33.6 % — LOW (ref 39–50)
HGB BLD-MCNC: 11 G/DL — LOW (ref 13–17)
LYMPHOCYTES # BLD AUTO: 19.2 % — SIGNIFICANT CHANGE UP (ref 13–44)
LYMPHOCYTES # BLD AUTO: 2 K/UL — SIGNIFICANT CHANGE UP (ref 1–3.3)
MCHC RBC-ENTMCNC: 27.6 PG — SIGNIFICANT CHANGE UP (ref 27–34)
MCHC RBC-ENTMCNC: 32.6 G/DL — SIGNIFICANT CHANGE UP (ref 32–36)
MCV RBC AUTO: 84.6 FL — SIGNIFICANT CHANGE UP (ref 80–100)
MONOCYTES # BLD AUTO: 0.7 K/UL — SIGNIFICANT CHANGE UP (ref 0–0.9)
MONOCYTES NFR BLD AUTO: 7.2 % — SIGNIFICANT CHANGE UP (ref 2–14)
NEUTROPHILS # BLD AUTO: 7.2 K/UL — SIGNIFICANT CHANGE UP (ref 1.8–7.4)
NEUTROPHILS NFR BLD AUTO: 70.2 % — SIGNIFICANT CHANGE UP (ref 43–77)
PLATELET # BLD AUTO: 303 K/UL — SIGNIFICANT CHANGE UP (ref 150–400)
RBC # BLD: 3.98 M/UL — LOW (ref 4.2–5.8)
RBC # FLD: 13.3 % — SIGNIFICANT CHANGE UP (ref 10.3–14.5)
WBC # BLD: 10.3 K/UL — SIGNIFICANT CHANGE UP (ref 3.8–10.5)
WBC # FLD AUTO: 10.3 K/UL — SIGNIFICANT CHANGE UP (ref 3.8–10.5)

## 2021-09-29 PROCEDURE — 99205 OFFICE O/P NEW HI 60 MIN: CPT

## 2021-09-29 RX ORDER — MULTIVITAMIN/IRON/FOLIC ACID 18MG-0.4MG
600-400 TABLET ORAL DAILY
Qty: 30 | Refills: 3 | Status: ACTIVE | COMMUNITY
Start: 2021-09-29 | End: 1900-01-01

## 2021-09-29 NOTE — RESULTS/DATA
[FreeTextEntry1] : 9/24/21: Bone scan: Pt had bone scan at the hospital that showed multifocal osseous mets in the axial and appendicular skeleton. Lesions in the left femoral neck/proximal femur, and bilateral proximal humeri may place the patient at risk for pathologic fractures.\par \par 9/22/21: Path 1  Femoral head. Bone and soft tissue, right hip fracture, arthroplasty\par - Femoral head with neck fracture\par - Pathologic fracture with METASTATIC ADENOCARCINOMA, granulation tissue\par \par 9/1/21: 1. Prostate, right and left apex, biopsy\par -Adenocarcinoma of the prostate, Prognostic Grade Group 5\par           (Grupo score 4+5=9) involving 100% (15 mm and 13 mm in length\par           respectively) of 2 of 2 core(s). [place] pattern 5 comprises 5%\par           of tumor.\par -Perineural invasion is identified\par \par           2. Prostate, right and left apex, biopsy\par -Adenocarcinoma of the prostate, Prognostic Grade Group 5\par           (Grupo score 4+5=9) involving 50%, 50%, and 80% (2 mm, 6 mm,\par           and 7 mm in length respectively) of 3 of 3 core(s). [place]\par           pattern 5 comprises 5% of tumor.\par -Perineural invasion is identified\par \par \par 8/4/21 CT ap IMPRESSION: Marked enlargement of the prostate suggestive of malignancy with evidence of extracapsular extension to the cul-de-sac and posterior bladder wall and right UVJ with resulting moderate right hydroureteronephrosis. Loculated fluid collection in the deep pelvis with circumferential rind of nodular tumor, with mass effect upon the distal rectum. Extensive retroperitoneal, para-aortic, and paracaval adenopathy. Extensive bony metastases.\par \par 8/4/21 MRI  : Large neoplasm encompassing nearly the entire prostate gland with gross extracapsular disease. Invasion into the base of the urinary bladder obstructing the right ureterovesical junction. Bilateral neurovascular bundle involvement. Bilateral seminal vesicle invasion with large cystic dilatation of the seminal vesicle secondary to obstruction with tumor nodularity along the wall.\par *PIRADS 5 - Very high (clinically significant cancer is highly likely to be present)\par \par Extensive pelvic lymphadenopathy and diffuse bone metastases.\par \par *Lesion descriptors and assessment categories are formulated utilizing PI-RADS v2.1.\par

## 2021-09-29 NOTE — ASSESSMENT
[FreeTextEntry1] : Mr. JACK UMANZOR is a 71 year old male with denovo metastatic prostate cancer diagnosed on 9/22 prostate bx that showed Adenocarcinoma of the prostate, Prognostic Grade Group 5 (Grupo score 4+5=9). Pt had a pathologic fracture of his right hip in September no s/p hip replacement on 9/22/21. Now recovering well. He was started on bicalutamide by urology. Bone scan showed multifocal osseous mets in the axial and appendicular skeleton. Lesions in the left femoral neck/proximal femur, and bilateral proximal humeri may place the patient at risk for pathologic fractures.\par \par # denovo metastatic prostate cancer to the bones\par -Pt on bicalutamide started by urology\par -will plan for Lupron ASAP\par -will start abiraterone + prednisone for mCSPC\par -dental clearance prior to starting prolia\par -vitamin D + calcium for bone health\par -will get baseline LFTs and PSA prior to starting therapy\par \par RTC in 4 weeks\par

## 2021-09-29 NOTE — HISTORY OF PRESENT ILLNESS
[de-identified] : Mr. JACK UMANZOR is a 71 year old male initially presented to urology in Oct 2020 for urinary frequency. He was treated for BPH. His PSA were checked in july 2021 and noted to be elevated to 1079. Further workup with MRI showed large neoplasm encompassing nearly the entire prostate gland with gross extracapsular disease. Invasion into the base of the urinary bladder obstructing the right ureterovesical junction. Bilateral neurovascular bundle involvement. Bilateral seminal vesicle invasion with large cystic  dilatation of the seminal vesicle secondary to obstruction with tumor nodularity along the wall. Extensive pelvic lymphadenopathy and diffuse bone metastases. Subsequent bx showed Adenocarcinoma of the prostate, Prognostic Grade Group 5 (Penrose score 4+5=9). Pt had a pathologic fracture of his right hip in September no s/p hip replacement on 9/22/21. Now recovering well. He was started on bicalutamide by urology.\par \par Pt had bone scan at the hospital that showed multifocal osseous mets in the axial and appendicular skeleton. Lesions in the left femoral neck/proximal femur, and bilateral proximal humeri may place the patient at risk for pathologic fractures.\par \par He is here to discuss further management of metastatic prostate cancer. Prior to this, his functional status has been excellent. Apart from BPH, he does not have any other comorbid conditions. He lives alone and has a wood workshop. His brother and sisters are nearby on the same street.\par \par PSA\par 7/2/21 - 921\par 7/20/21 - 1079 \par \par

## 2021-09-30 LAB
ALBUMIN SERPL ELPH-MCNC: 3.7 G/DL
ALP BLD-CCNC: 982 U/L
ALT SERPL-CCNC: 13 U/L
ANION GAP SERPL CALC-SCNC: 15 MMOL/L
AST SERPL-CCNC: 17 U/L
BILIRUB SERPL-MCNC: 0.5 MG/DL
BUN SERPL-MCNC: 26 MG/DL
CALCIUM SERPL-MCNC: 8.7 MG/DL
CHLORIDE SERPL-SCNC: 103 MMOL/L
CO2 SERPL-SCNC: 22 MMOL/L
CREAT SERPL-MCNC: 1.22 MG/DL
GLUCOSE SERPL-MCNC: 128 MG/DL
LDH SERPL-CCNC: 209 U/L
POTASSIUM SERPL-SCNC: 4.3 MMOL/L
PROT SERPL-MCNC: 6.5 G/DL
PSA SERPL-MCNC: 865 NG/ML
SODIUM SERPL-SCNC: 140 MMOL/L

## 2021-10-04 ENCOUNTER — APPOINTMENT (OUTPATIENT)
Age: 71
End: 2021-10-04

## 2021-10-04 LAB
TESTOST BND SERPL-MCNC: 5.3 PG/ML
TESTOSTERONE TOTAL S: 185 NG/DL

## 2021-10-08 ENCOUNTER — APPOINTMENT (OUTPATIENT)
Dept: FAMILY MEDICINE | Facility: CLINIC | Age: 71
End: 2021-10-08
Payer: MEDICARE

## 2021-10-08 DIAGNOSIS — K64.9 UNSPECIFIED HEMORRHOIDS: ICD-10-CM

## 2021-10-08 PROCEDURE — 99442: CPT | Mod: 95

## 2021-10-08 RX ORDER — AMOXICILLIN 250 MG
8.6-5 CAPSULE ORAL
Qty: 30 | Refills: 0 | Status: ACTIVE | COMMUNITY
Start: 2021-10-08 | End: 1900-01-01

## 2021-10-12 ENCOUNTER — APPOINTMENT (OUTPATIENT)
Dept: ORTHOPEDIC SURGERY | Facility: CLINIC | Age: 71
End: 2021-10-12
Payer: MEDICARE

## 2021-10-12 VITALS
SYSTOLIC BLOOD PRESSURE: 130 MMHG | WEIGHT: 166 LBS | HEART RATE: 97 BPM | DIASTOLIC BLOOD PRESSURE: 70 MMHG | HEIGHT: 70 IN | BODY MASS INDEX: 23.77 KG/M2

## 2021-10-12 PROCEDURE — 73502 X-RAY EXAM HIP UNI 2-3 VIEWS: CPT | Mod: RT

## 2021-10-12 PROCEDURE — 99024 POSTOP FOLLOW-UP VISIT: CPT

## 2021-10-12 NOTE — HISTORY OF PRESENT ILLNESS
[de-identified] : S/P right hip posterior approach hemiarthroplasty DOS 9/22/21 [de-identified] : Patient is doing well three weeks status post right hip hemiarthroplasty posterior approach. He states his pain is minimal at this time and he is not taking medication for pain. Patient completed in home PT and is ready to start outpatient. He denies any fevers, chills, or incisional issues. Pathology showed prostate adenocarcinoma and the patient was informed of this and is under treatment for metastatic prostate cancer. [de-identified] : Exam of the right hip shows a well healing incision. No infection.  He can ambulate with no assistive devices with a mild limp. 5/5 motor strength bilaterally distally. Sensation intact distally.		\par  [de-identified] :  X-ray: AP of the pelvis and 2 views of the right hip demonstrate a right hip hemiarthroplasty in stable position, with no evidence of fracture, loosening, or dislocation.		\par  [de-identified] : The patient is doing very well 3 weeks after right posterior right hip hemiarthroplasty. The patient will be transitioned to outpatient physical therapy and a prescription was given for that. Continue DVT prophylaxis with lovenox.  Anterior hip precautions were reinforced. Overall the patient is very happy with their outcome so far. Followup in 3 weeks with repeat x-rays.		\par

## 2021-10-12 NOTE — ADDENDUM
[FreeTextEntry1] : This note was authored by Slava Nava working as a medical scribe for Dr. Collins Edmond. The note was reviewed, edited, and revised by Dr. Collins Edmond whom is in agreement with the exam findings, imaging findings, and treatment plan. 10/12/2021		\par

## 2021-10-30 ENCOUNTER — OUTPATIENT (OUTPATIENT)
Dept: OUTPATIENT SERVICES | Facility: HOSPITAL | Age: 71
LOS: 1 days | Discharge: HOME | End: 2021-10-30

## 2021-10-30 DIAGNOSIS — Z90.49 ACQUIRED ABSENCE OF OTHER SPECIFIED PARTS OF DIGESTIVE TRACT: Chronic | ICD-10-CM

## 2021-10-30 DIAGNOSIS — C61 MALIGNANT NEOPLASM OF PROSTATE: ICD-10-CM

## 2021-11-02 ENCOUNTER — APPOINTMENT (OUTPATIENT)
Dept: ORTHOPEDIC SURGERY | Facility: CLINIC | Age: 71
End: 2021-11-02
Payer: MEDICARE

## 2021-11-02 VITALS
DIASTOLIC BLOOD PRESSURE: 79 MMHG | HEIGHT: 70 IN | HEART RATE: 114 BPM | SYSTOLIC BLOOD PRESSURE: 137 MMHG | WEIGHT: 156 LBS | BODY MASS INDEX: 22.33 KG/M2

## 2021-11-02 DIAGNOSIS — Z96.641 PRESENCE OF RIGHT ARTIFICIAL HIP JOINT: ICD-10-CM

## 2021-11-02 PROCEDURE — 99024 POSTOP FOLLOW-UP VISIT: CPT

## 2021-11-02 PROCEDURE — 73502 X-RAY EXAM HIP UNI 2-3 VIEWS: CPT | Mod: RT

## 2021-11-02 NOTE — ADDENDUM
[FreeTextEntry1] : This note was authored by Slava Nava working as a medical scribe for Dr. Collins Edmond. The note was reviewed, edited, and revised by Dr. Collins Edmond whom is in agreement with the exam findings, imaging findings, and treatment plan. 11/02/2021

## 2021-11-02 NOTE — HISTORY OF PRESENT ILLNESS
[de-identified] : S/P right hip posterior approach hemiarthroplasty DOS 9/22/21.  [de-identified] : Patient is doing well 6 weeks status post right hip hemiarthroplasty posterior approach.  He stumbled on the stairs about a week and a half ago and caught himself before he fell to the floor.  He was able to do physical therapy after that but soon developed more pain in the right hip.  The pain is in different locations at different times.  He is concerned about this because prior to this he had no pain at all.  He is still able to walk and can walk in the exam room without assistive devices.  He returned to using the cane particular because he has pain going up and down stairs when trying to reciprocate.  Patient is in outpatient physical therapy. He denies any fevers, chills, or incisional issues. [de-identified] : Exam of the right hip shows the patient can ambulate with minimal limp, a well healed incision, hip flexion of 90 degrees(without pain), hip external rotation of 45 degrees(without pain). Patient can perform a straight leg raise.	5/5 motor strength bilaterally distally. Sensation intact distally.		  [de-identified] : Imaging:. X-ray: AP of the pelvis and 2 views of the right hip demonstrate a right hip hemiarthroplasty in stable position, with no evidence of fracture, loosening, or dislocation.	 [de-identified] : The patient is doing well 6 weeks after right posterior right hip hemiarthroplasty.  I think he probably overdid it to some extent early on during his postoperative course resulting in a stumble recently and now as he is having some more discomfort.  I recommended he continue to use the cane until his pain resolves and that he decrease his activity level.  The patient will continue outpatient physical therapy.  Posterior hip precautions were reinforced.  Followup in 6 weeks with repeat x-rays.

## 2021-11-04 ENCOUNTER — RESULT REVIEW (OUTPATIENT)
Age: 71
End: 2021-11-04

## 2021-11-04 ENCOUNTER — APPOINTMENT (OUTPATIENT)
Dept: HEMATOLOGY ONCOLOGY | Facility: CLINIC | Age: 71
End: 2021-11-04
Payer: MEDICARE

## 2021-11-04 ENCOUNTER — APPOINTMENT (OUTPATIENT)
Age: 71
End: 2021-11-04

## 2021-11-04 VITALS
BODY MASS INDEX: 23.77 KG/M2 | SYSTOLIC BLOOD PRESSURE: 149 MMHG | HEIGHT: 70 IN | HEART RATE: 92 BPM | DIASTOLIC BLOOD PRESSURE: 68 MMHG | WEIGHT: 166 LBS | OXYGEN SATURATION: 98 %

## 2021-11-04 LAB
BASOPHILS # BLD AUTO: 0.1 K/UL — SIGNIFICANT CHANGE UP (ref 0–0.2)
BASOPHILS NFR BLD AUTO: 1.2 % — SIGNIFICANT CHANGE UP (ref 0–2)
EOSINOPHIL # BLD AUTO: 0.1 K/UL — SIGNIFICANT CHANGE UP (ref 0–0.5)
EOSINOPHIL NFR BLD AUTO: 1.6 % — SIGNIFICANT CHANGE UP (ref 0–6)
HCT VFR BLD CALC: 38.6 % — LOW (ref 39–50)
HGB BLD-MCNC: 12.1 G/DL — LOW (ref 13–17)
LYMPHOCYTES # BLD AUTO: 2 K/UL — SIGNIFICANT CHANGE UP (ref 1–3.3)
LYMPHOCYTES # BLD AUTO: 21.9 % — SIGNIFICANT CHANGE UP (ref 13–44)
MCHC RBC-ENTMCNC: 28.8 PG — SIGNIFICANT CHANGE UP (ref 27–34)
MCHC RBC-ENTMCNC: 31.4 G/DL — LOW (ref 32–36)
MCV RBC AUTO: 91.7 FL — SIGNIFICANT CHANGE UP (ref 80–100)
MONOCYTES # BLD AUTO: 0.7 K/UL — SIGNIFICANT CHANGE UP (ref 0–0.9)
MONOCYTES NFR BLD AUTO: 7.6 % — SIGNIFICANT CHANGE UP (ref 2–14)
NEUTROPHILS # BLD AUTO: 6.1 K/UL — SIGNIFICANT CHANGE UP (ref 1.8–7.4)
NEUTROPHILS NFR BLD AUTO: 67.7 % — SIGNIFICANT CHANGE UP (ref 43–77)
PLATELET # BLD AUTO: 304 K/UL — SIGNIFICANT CHANGE UP (ref 150–400)
RBC # BLD: 4.21 M/UL — SIGNIFICANT CHANGE UP (ref 4.2–5.8)
RBC # FLD: 15.2 % — HIGH (ref 10.3–14.5)
WBC # BLD: 9 K/UL — SIGNIFICANT CHANGE UP (ref 3.8–10.5)
WBC # FLD AUTO: 9 K/UL — SIGNIFICANT CHANGE UP (ref 3.8–10.5)

## 2021-11-04 PROCEDURE — 99213 OFFICE O/P EST LOW 20 MIN: CPT

## 2021-11-04 NOTE — RESULTS/DATA
[FreeTextEntry1] : 9/24/21: Bone scan: Pt had bone scan at the hospital that showed multifocal osseous mets in the axial and appendicular skeleton. Lesions in the left femoral neck/proximal femur, and bilateral proximal humeri may place the patient at risk for pathologic fractures.\par \par 9/22/21: Path 1  Femoral head. Bone and soft tissue, right hip fracture, arthroplasty\par - Femoral head with neck fracture\par - Pathologic fracture with METASTATIC ADENOCARCINOMA, granulation tissue\par \par 9/1/21: 1. Prostate, right and left apex, biopsy\par -Adenocarcinoma of the prostate, Prognostic Grade Group 5\par           (Grupo score 4+5=9) involving 100% (15 mm and 13 mm in length\par           respectively) of 2 of 2 core(s). [place] pattern 5 comprises 5%\par           of tumor.\par -Perineural invasion is identified\par \par           2. Prostate, right and left apex, biopsy\par -Adenocarcinoma of the prostate, Prognostic Grade Group 5\par           (Grupo score 4+5=9) involving 50%, 50%, and 80% (2 mm, 6 mm,\par           and 7 mm in length respectively) of 3 of 3 core(s). [place]\par           pattern 5 comprises 5% of tumor.\par -Perineural invasion is identified\par \par \par 8/4/21 CT ap IMPRESSION: Marked enlargement of the prostate suggestive of malignancy with evidence of extracapsular extension to the cul-de-sac and posterior bladder wall and right UVJ with resulting moderate right hydroureteronephrosis. Loculated fluid collection in the deep pelvis with circumferential rind of nodular tumor, with mass effect upon the distal rectum. Extensive retroperitoneal, para-aortic, and paracaval adenopathy. Extensive bony metastases.\par \par 8/4/21 MRI  : Large neoplasm encompassing nearly the entire prostate gland with gross extracapsular disease. Invasion into the base of the urinary bladder obstructing the right ureterovesical junction. Bilateral neurovascular bundle involvement. Bilateral seminal vesicle invasion with large cystic dilatation of the seminal vesicle secondary to obstruction with tumor nodularity along the wall.\par *PIRADS 5 - Very high (clinically significant cancer is highly likely to be present)\par \par Extensive pelvic lymphadenopathy and diffuse bone metastases.\par \par *Lesion descriptors and assessment categories are formulated utilizing PI-RADS v2.1.\par

## 2021-11-04 NOTE — HISTORY OF PRESENT ILLNESS
[de-identified] : Mr. JACK UMANZOR is a 71 year old male initially presented to urology in Oct 2020 for urinary frequency. He was treated for BPH. His PSA were checked in july 2021 and noted to be elevated to 1079. Further workup with MRI showed large neoplasm encompassing nearly the entire prostate gland with gross extracapsular disease. Invasion into the base of the urinary bladder obstructing the right ureterovesical junction. Bilateral neurovascular bundle involvement. Bilateral seminal vesicle invasion with large cystic  dilatation of the seminal vesicle secondary to obstruction with tumor nodularity along the wall. Extensive pelvic lymphadenopathy and diffuse bone metastases. Subsequent bx showed Adenocarcinoma of the prostate, Prognostic Grade Group 5 ([place] score 4+5=9). Pt had a pathologic fracture of his right hip in September no s/p hip replacement on 9/22/21. Now recovering well. He was started on bicalutamide by urology.\par \par Pt had bone scan at the hospital that showed multifocal osseous mets in the axial and appendicular skeleton. Lesions in the left femoral neck/proximal femur, and bilateral proximal humeri may place the patient at risk for pathologic fractures.\par \par He is here to discuss further management of metastatic prostate cancer. Prior to this, his functional status has been excellent. Apart from BPH, he does not have any other comorbid conditions. He lives alone and has a wood workshop. His brother and sisters are nearby on the same street.\par \par PSA\par 7/2/21 - 921\par 7/20/21 - 1079 \par \par \par \par \par  [de-identified] : 11/4/21: Pt is tolerating abiraterone and lupron. Denies hot flashes. Urinary frequency has improved. He gets up about 4 times per night.  self cath at night for urination. He has right hip pain so he is not as active and uses a cane. Denies HA. CP, SOB, abd pain, constipation, diarrhea, melena, hematuria, dysuria.

## 2021-11-04 NOTE — ASSESSMENT
[FreeTextEntry1] : Mr. JACK UMANZOR is a 71 year old male with denovo metastatic prostate cancer diagnosed on 9/22 prostate bx that showed Adenocarcinoma of the prostate, Prognostic Grade Group 5 (Grupo score 4+5=9). Pt had a pathologic fracture of his right hip in September no s/p hip replacement on 9/22/21. Now recovering well. He was started on bicalutamide by urology. Bone scan showed multifocal osseous mets in the axial and appendicular skeleton. Lesions in the left femoral neck/proximal femur, and bilateral proximal humeri may place the patient at risk for pathologic fractures.\par \par # denovo metastatic prostate cancer to the bones\par -Pt on bicalutamide started by urology\par -continue Lupron +  abiraterone + prednisone for mCSPC\par -started prolia after dental clearance\par -vitamin D + calcium for bone health\par \par \par RTC in 3 months\par

## 2021-11-05 ENCOUNTER — APPOINTMENT (OUTPATIENT)
Dept: FAMILY MEDICINE | Facility: CLINIC | Age: 71
End: 2021-11-05
Payer: MEDICARE

## 2021-11-05 VITALS
BODY MASS INDEX: 24.2 KG/M2 | TEMPERATURE: 97.2 F | HEART RATE: 104 BPM | DIASTOLIC BLOOD PRESSURE: 62 MMHG | SYSTOLIC BLOOD PRESSURE: 126 MMHG | HEIGHT: 70 IN | OXYGEN SATURATION: 98 % | WEIGHT: 169 LBS

## 2021-11-05 DIAGNOSIS — N13.8 BENIGN PROSTATIC HYPERPLASIA WITH LOWER URINARY TRACT SYMPMS: ICD-10-CM

## 2021-11-05 DIAGNOSIS — M85.80 OTHER SPECIFIED DISORDERS OF BONE DENSITY AND STRUCTURE, UNSPECIFIED SITE: ICD-10-CM

## 2021-11-05 DIAGNOSIS — N40.1 BENIGN PROSTATIC HYPERPLASIA WITH LOWER URINARY TRACT SYMPMS: ICD-10-CM

## 2021-11-05 DIAGNOSIS — G47.9 SLEEP DISORDER, UNSPECIFIED: ICD-10-CM

## 2021-11-05 DIAGNOSIS — Z23 ENCOUNTER FOR IMMUNIZATION: ICD-10-CM

## 2021-11-05 DIAGNOSIS — C79.51 SECONDARY MALIGNANT NEOPLASM OF BONE: ICD-10-CM

## 2021-11-05 DIAGNOSIS — C61 MALIGNANT NEOPLASM OF PROSTATE: ICD-10-CM

## 2021-11-05 LAB
ALBUMIN SERPL ELPH-MCNC: 4.8 G/DL
ALP BLD-CCNC: 1731 U/L
ALT SERPL-CCNC: 5 U/L
ANION GAP SERPL CALC-SCNC: 11 MMOL/L
AST SERPL-CCNC: 13 U/L
BILIRUB SERPL-MCNC: 0.4 MG/DL
BUN SERPL-MCNC: 24 MG/DL
CALCIUM SERPL-MCNC: 9.7 MG/DL
CHLORIDE SERPL-SCNC: 105 MMOL/L
CO2 SERPL-SCNC: 27 MMOL/L
CREAT SERPL-MCNC: 1.25 MG/DL
GLUCOSE SERPL-MCNC: 123 MG/DL
LDH SERPL-CCNC: 202 U/L
POTASSIUM SERPL-SCNC: 4.9 MMOL/L
PROT SERPL-MCNC: 7.3 G/DL
PSA SERPL-MCNC: 157 NG/ML
SODIUM SERPL-SCNC: 143 MMOL/L

## 2021-11-05 PROCEDURE — 99214 OFFICE O/P EST MOD 30 MIN: CPT | Mod: 25

## 2021-11-05 PROCEDURE — G0008: CPT

## 2021-11-05 PROCEDURE — 90662 IIV NO PRSV INCREASED AG IM: CPT

## 2021-11-05 NOTE — PHYSICAL EXAM
[Normal] : normal rate, regular rhythm, normal S1 and S2 and no murmur heard [de-identified] : pain on back- right side

## 2021-11-05 NOTE — REVIEW OF SYSTEMS
[Shortness Of Breath] : shortness of breath [Dysuria] : dysuria [Incontinence] : incontinence [Hesitancy] : hesitancy [Frequency] : frequency [Joint Pain] : joint pain [Muscle Pain] : muscle pain [Muscle Weakness] : muscle weakness [Back Pain] : back pain [Headache] : headache [Insomnia] : insomnia [Negative] : Constitutional

## 2021-11-05 NOTE — ASSESSMENT
[FreeTextEntry1] : refilled oxy for pt- hx of bone mets\par take as needed\par f.u with heme onc and ortho as needed \par \par Sleep- discussed trialing oxy and aleve for pain management for next 1-2 weeks\par then can send script for ambien \par \par flu vaccine given

## 2021-11-09 LAB
TESTOST BND SERPL-MCNC: <0.2 PG/ML
TESTOSTERONE TOTAL S: <3 NG/DL

## 2021-12-02 ENCOUNTER — RESULT REVIEW (OUTPATIENT)
Age: 71
End: 2021-12-02

## 2021-12-02 ENCOUNTER — APPOINTMENT (OUTPATIENT)
Dept: RADIOLOGY | Facility: CLINIC | Age: 71
End: 2021-12-02
Payer: MEDICARE

## 2021-12-02 ENCOUNTER — APPOINTMENT (OUTPATIENT)
Dept: FAMILY MEDICINE | Facility: CLINIC | Age: 71
End: 2021-12-02
Payer: MEDICARE

## 2021-12-02 ENCOUNTER — OUTPATIENT (OUTPATIENT)
Dept: OUTPATIENT SERVICES | Facility: HOSPITAL | Age: 71
LOS: 1 days | End: 2021-12-02
Payer: MEDICARE

## 2021-12-02 VITALS
DIASTOLIC BLOOD PRESSURE: 80 MMHG | TEMPERATURE: 97 F | HEART RATE: 83 BPM | BODY MASS INDEX: 24.34 KG/M2 | OXYGEN SATURATION: 98 % | HEIGHT: 70 IN | WEIGHT: 170 LBS | SYSTOLIC BLOOD PRESSURE: 128 MMHG

## 2021-12-02 DIAGNOSIS — M99.08 SEGMENTAL AND SOMATIC DYSFUNCTION OF RIB CAGE: ICD-10-CM

## 2021-12-02 DIAGNOSIS — Z90.49 ACQUIRED ABSENCE OF OTHER SPECIFIED PARTS OF DIGESTIVE TRACT: Chronic | ICD-10-CM

## 2021-12-02 PROCEDURE — 71110 X-RAY EXAM RIBS BIL 3 VIEWS: CPT | Mod: 26

## 2021-12-02 PROCEDURE — 71110 X-RAY EXAM RIBS BIL 3 VIEWS: CPT

## 2021-12-02 PROCEDURE — 99214 OFFICE O/P EST MOD 30 MIN: CPT

## 2021-12-02 NOTE — HISTORY OF PRESENT ILLNESS
[FreeTextEntry8] : PT presenting for rib cage pain x 1 month\par has been occuring on and off\par decreased dose/frequency of oxy due to constipation. long term AE\par still on chemo for prostate CA \par sees ortho for hip pain in 1 week \par \par pain with deep breaths or palpations/sneezing

## 2021-12-02 NOTE — ASSESSMENT
[FreeTextEntry1] : unsure if mets to rib, vs rib fracture \par CXR ordered \par discussed followup \par cont to monitor symptoms

## 2021-12-02 NOTE — PHYSICAL EXAM
[Normal] : normal rate, regular rhythm, normal S1 and S2 and no murmur heard [de-identified] : rib pain with palpation

## 2021-12-14 ENCOUNTER — APPOINTMENT (OUTPATIENT)
Dept: ORTHOPEDIC SURGERY | Facility: CLINIC | Age: 71
End: 2021-12-14
Payer: MEDICARE

## 2021-12-14 VITALS
WEIGHT: 170 LBS | HEART RATE: 102 BPM | DIASTOLIC BLOOD PRESSURE: 94 MMHG | HEIGHT: 70 IN | BODY MASS INDEX: 24.34 KG/M2 | SYSTOLIC BLOOD PRESSURE: 152 MMHG

## 2021-12-14 DIAGNOSIS — Z47.1 AFTERCARE FOLLOWING JOINT REPLACEMENT SURGERY: ICD-10-CM

## 2021-12-14 PROCEDURE — 99024 POSTOP FOLLOW-UP VISIT: CPT

## 2021-12-14 PROCEDURE — 73502 X-RAY EXAM HIP UNI 2-3 VIEWS: CPT | Mod: RT

## 2021-12-14 NOTE — ADDENDUM
[FreeTextEntry1] : This note was authored by Slava Nava working as a medical scribe for Dr. oCllins Edmond. The note was reviewed, edited, and revised by Dr. Collins Edmond whom is in agreement with the exam findings, imaging findings, and treatment plan. 12/14/2021

## 2021-12-14 NOTE — HISTORY OF PRESENT ILLNESS
[de-identified] : S/P right hip posterior approach hemiarthroplasty DOS 9/22/21.  [de-identified] : Patient is doing well 11 weeks status post right hip hemiarthroplasty posterior approach. He continues to go to physical therapy which is going well. He continues to use a cane for ambulation while outside. He denies major pain at this time. He denies any fevers, chills, or incisional issues. [de-identified] : Exam of the right hip shows a well healed incision, hip flexion of 90 degrees, hip external rotation of 45 degrees. There is no pain with range of motion. Patient can perform a straight leg raise.		  \par 5/5 motor strength bilaterally distally. Sensation intact distally.		  [de-identified] :  X-ray: AP of the pelvis and 2 views of the right hip demonstrate a right hip hemiarthroplasty in stable position, with no evidence of fracture, loosening, or dislocation.		  [de-identified] : The patient is doing very well 3 months following posterior right hip hemiarthroplasty. The patient will continue their home exercises. Overall he  is making excellent progress and is very happy with the result so far.  Dental prophylaxis was reviewed. Follow up one year post op for radiographic surveillance.

## 2021-12-22 ENCOUNTER — OUTPATIENT (OUTPATIENT)
Dept: OUTPATIENT SERVICES | Facility: HOSPITAL | Age: 71
LOS: 1 days | Discharge: HOME | End: 2021-12-22

## 2021-12-22 DIAGNOSIS — Z90.49 ACQUIRED ABSENCE OF OTHER SPECIFIED PARTS OF DIGESTIVE TRACT: Chronic | ICD-10-CM

## 2021-12-22 DIAGNOSIS — C61 MALIGNANT NEOPLASM OF PROSTATE: ICD-10-CM

## 2021-12-23 ENCOUNTER — RESULT REVIEW (OUTPATIENT)
Age: 71
End: 2021-12-23

## 2021-12-23 ENCOUNTER — APPOINTMENT (OUTPATIENT)
Dept: HEMATOLOGY ONCOLOGY | Facility: CLINIC | Age: 71
End: 2021-12-23
Payer: MEDICARE

## 2021-12-23 VITALS
WEIGHT: 171 LBS | OXYGEN SATURATION: 97 % | SYSTOLIC BLOOD PRESSURE: 130 MMHG | DIASTOLIC BLOOD PRESSURE: 79 MMHG | HEART RATE: 95 BPM | BODY MASS INDEX: 24.48 KG/M2 | HEIGHT: 70 IN

## 2021-12-23 DIAGNOSIS — M99.08 SEGMENTAL AND SOMATIC DYSFUNCTION OF RIB CAGE: ICD-10-CM

## 2021-12-23 LAB
BASOPHILS # BLD AUTO: 0.1 K/UL — SIGNIFICANT CHANGE UP (ref 0–0.2)
BASOPHILS NFR BLD AUTO: 1.3 % — SIGNIFICANT CHANGE UP (ref 0–2)
EOSINOPHIL # BLD AUTO: 0.1 K/UL — SIGNIFICANT CHANGE UP (ref 0–0.5)
EOSINOPHIL NFR BLD AUTO: 1.6 % — SIGNIFICANT CHANGE UP (ref 0–6)
HCT VFR BLD CALC: 43.1 % — SIGNIFICANT CHANGE UP (ref 39–50)
HGB BLD-MCNC: 13.8 G/DL — SIGNIFICANT CHANGE UP (ref 13–17)
LYMPHOCYTES # BLD AUTO: 1.9 K/UL — SIGNIFICANT CHANGE UP (ref 1–3.3)
LYMPHOCYTES # BLD AUTO: 25.7 % — SIGNIFICANT CHANGE UP (ref 13–44)
MCHC RBC-ENTMCNC: 29.3 PG — SIGNIFICANT CHANGE UP (ref 27–34)
MCHC RBC-ENTMCNC: 32.1 G/DL — SIGNIFICANT CHANGE UP (ref 32–36)
MCV RBC AUTO: 91.3 FL — SIGNIFICANT CHANGE UP (ref 80–100)
MONOCYTES # BLD AUTO: 0.6 K/UL — SIGNIFICANT CHANGE UP (ref 0–0.9)
MONOCYTES NFR BLD AUTO: 8.3 % — SIGNIFICANT CHANGE UP (ref 2–14)
NEUTROPHILS # BLD AUTO: 4.6 K/UL — SIGNIFICANT CHANGE UP (ref 1.8–7.4)
NEUTROPHILS NFR BLD AUTO: 63.1 % — SIGNIFICANT CHANGE UP (ref 43–77)
PLATELET # BLD AUTO: 309 K/UL — SIGNIFICANT CHANGE UP (ref 150–400)
RBC # BLD: 4.72 M/UL — SIGNIFICANT CHANGE UP (ref 4.2–5.8)
RBC # FLD: 13.4 % — SIGNIFICANT CHANGE UP (ref 10.3–14.5)
WBC # BLD: 7.3 K/UL — SIGNIFICANT CHANGE UP (ref 3.8–10.5)
WBC # FLD AUTO: 7.3 K/UL — SIGNIFICANT CHANGE UP (ref 3.8–10.5)

## 2021-12-23 PROCEDURE — 99214 OFFICE O/P EST MOD 30 MIN: CPT

## 2021-12-23 NOTE — ASSESSMENT
[FreeTextEntry1] : Mr. JACK UMANZOR is a 71 year old male with denovo metastatic prostate cancer diagnosed on 9/22 prostate bx that showed Adenocarcinoma of the prostate, Prognostic Grade Group 5 (Grupo score 4+5=9). Pt had a pathologic fracture of his right hip in September no s/p hip replacement on 9/22/21. Now recovering well. He was started on bicalutamide by urology. \par \par # denovo metastatic prostate cancer to the bones\par -Pt on bicalutamide started by urology\par -continue Lupron +  abiraterone + prednisone for mCSPC\par -started prolia after dental clearance\par -vitamin D + calcium for bone health\par -Bone scan showed multifocal osseous mets in the axial and appendicular skeleton. Lesions in the left femoral neck/proximal femur, and bilateral proximal humeri may place the patient at risk for pathologic fractures.\par -pt has rib cage pain and on oxycodone prn, Xray showed left ant 8th rib age indeterminant fracture\par -PSA trending down\par \par RTC in 4 weeks \par

## 2021-12-23 NOTE — HISTORY OF PRESENT ILLNESS
[de-identified] : Mr. JACK UMANZOR is a 71 year old male initially presented to urology in Oct 2020 for urinary frequency. He was treated for BPH. His PSA were checked in july 2021 and noted to be elevated to 1079. Further workup with MRI showed large neoplasm encompassing nearly the entire prostate gland with gross extracapsular disease. Invasion into the base of the urinary bladder obstructing the right ureterovesical junction. Bilateral neurovascular bundle involvement. Bilateral seminal vesicle invasion with large cystic  dilatation of the seminal vesicle secondary to obstruction with tumor nodularity along the wall. Extensive pelvic lymphadenopathy and diffuse bone metastases. Subsequent bx showed Adenocarcinoma of the prostate, Prognostic Grade Group 5 ([place] score 4+5=9). Pt had a pathologic fracture of his right hip in September no s/p hip replacement on 9/22/21. Now recovering well. He was started on bicalutamide by urology.\par \par Pt had bone scan at the hospital that showed multifocal osseous mets in the axial and appendicular skeleton. Lesions in the left femoral neck/proximal femur, and bilateral proximal humeri may place the patient at risk for pathologic fractures.\par \par He is here to discuss further management of metastatic prostate cancer. Prior to this, his functional status has been excellent. Apart from BPH, he does not have any other comorbid conditions. He lives alone and has a wood workshop. His brother and sisters are nearby on the same street.\par \par PSA\par 7/2/21 - 921\par 7/20/21 - 1079 \par \par \par \par \par  [de-identified] : 11/4/21: Pt is tolerating abiraterone and lupron. Denies hot flashes. Urinary frequency has improved. He gets up about 4 times per night.  self cath at night for urination. He has right hip pain so he is not as active and uses a cane. Denies HA. CP, SOB, abd pain, constipation, diarrhea, melena, hematuria, dysuria. \par \par 12/23/21: pt has pain in the whole rib cage.  . CXR showed . Age indeterminate possibly pathologic anterior left 8th rib fracture deformity near costochondral junction. Multifocal predominantly osteosclerotic however heterogeneous mixed density osseous metastatic disease.  On oxycodone prn.  Started having hot flashes which are tolerable. Occasional  self cath for urination. He would lke to start doing Wood work, advised not to lift too heavy of a weight. \par \par

## 2021-12-23 NOTE — RESULTS/DATA
[FreeTextEntry1] : 9/24/21: Bone scan: Pt had bone scan at the hospital that showed multifocal osseous mets in the axial and appendicular skeleton. Lesions in the left femoral neck/proximal femur, and bilateral proximal humeri may place the patient at risk for pathologic fractures.\par \par 9/22/21: Path 1  Femoral head. Bone and soft tissue, right hip fracture, arthroplasty\par - Femoral head with neck fracture\par - Pathologic fracture with METASTATIC ADENOCARCINOMA, granulation tissue\par \par 9/1/21: 1. Prostate, right and left apex, biopsy\par -Adenocarcinoma of the prostate, Prognostic Grade Group 5\par           (Grupo score 4+5=9) involving 100% (15 mm and 13 mm in length\par           respectively) of 2 of 2 core(s). [place] pattern 5 comprises 5%\par           of tumor.\par -Perineural invasion is identified\par \par           2. Prostate, right and left apex, biopsy\par -Adenocarcinoma of the prostate, Prognostic Grade Group 5\par           (Grupo score 4+5=9) involving 50%, 50%, and 80% (2 mm, 6 mm,\par           and 7 mm in length respectively) of 3 of 3 core(s). [place]\par           pattern 5 comprises 5% of tumor.\par -Perineural invasion is identified\par \par \par 8/4/21 CT ap IMPRESSION: Marked enlargement of the prostate suggestive of malignancy with evidence of extracapsular extension to the cul-de-sac and posterior bladder wall and right UVJ with resulting moderate right hydroureteronephrosis. Loculated fluid collection in the deep pelvis with circumferential rind of nodular tumor, with mass effect upon the distal rectum. Extensive retroperitoneal, para-aortic, and paracaval adenopathy. Extensive bony metastases.\par \par 8/4/21 MRI  : Large neoplasm encompassing nearly the entire prostate gland with gross extracapsular disease. Invasion into the base of the urinary bladder obstructing the right ureterovesical junction. Bilateral neurovascular bundle involvement. Bilateral seminal vesicle invasion with large cystic dilatation of the seminal vesicle secondary to obstruction with tumor nodularity along the wall.\par *PIRADS 5 - Very high (clinically significant cancer is highly likely to be present)\par \par Extensive pelvic lymphadenopathy and diffuse bone metastases.\par \par *Lesion descriptors and assessment categories are formulated utilizing PI-RADS v2.1.\par

## 2022-01-01 ENCOUNTER — APPOINTMENT (OUTPATIENT)
Dept: HEMATOLOGY ONCOLOGY | Facility: CLINIC | Age: 72
End: 2022-01-01

## 2022-01-01 ENCOUNTER — RESULT REVIEW (OUTPATIENT)
Age: 72
End: 2022-01-01

## 2022-01-01 ENCOUNTER — NON-APPOINTMENT (OUTPATIENT)
Age: 72
End: 2022-01-01

## 2022-01-01 ENCOUNTER — OUTPATIENT (OUTPATIENT)
Dept: OUTPATIENT SERVICES | Facility: HOSPITAL | Age: 72
LOS: 1 days | Discharge: HOME | End: 2022-01-01

## 2022-01-01 ENCOUNTER — OUTPATIENT (OUTPATIENT)
Dept: OUTPATIENT SERVICES | Facility: HOSPITAL | Age: 72
LOS: 1 days | End: 2022-01-01
Payer: MEDICARE

## 2022-01-01 ENCOUNTER — TRANSCRIPTION ENCOUNTER (OUTPATIENT)
Age: 72
End: 2022-01-01

## 2022-01-01 ENCOUNTER — APPOINTMENT (OUTPATIENT)
Dept: FAMILY MEDICINE | Facility: CLINIC | Age: 72
End: 2022-01-01

## 2022-01-01 ENCOUNTER — APPOINTMENT (OUTPATIENT)
Age: 72
End: 2022-01-01

## 2022-01-01 ENCOUNTER — APPOINTMENT (OUTPATIENT)
Dept: FAMILY MEDICINE | Facility: CLINIC | Age: 72
End: 2022-01-01
Payer: MEDICARE

## 2022-01-01 ENCOUNTER — APPOINTMENT (OUTPATIENT)
Dept: SURGERY | Facility: CLINIC | Age: 72
End: 2022-01-01

## 2022-01-01 ENCOUNTER — RX RENEWAL (OUTPATIENT)
Age: 72
End: 2022-01-01

## 2022-01-01 ENCOUNTER — APPOINTMENT (OUTPATIENT)
Dept: CT IMAGING | Facility: CLINIC | Age: 72
End: 2022-01-01

## 2022-01-01 ENCOUNTER — APPOINTMENT (OUTPATIENT)
Dept: RADIOLOGY | Facility: CLINIC | Age: 72
End: 2022-01-01

## 2022-01-01 ENCOUNTER — INPATIENT (INPATIENT)
Facility: HOSPITAL | Age: 72
LOS: 5 days | Discharge: HOME | DRG: 803 | End: 2022-12-13
Attending: STUDENT IN AN ORGANIZED HEALTH CARE EDUCATION/TRAINING PROGRAM | Admitting: HOSPITALIST
Payer: MEDICARE

## 2022-01-01 ENCOUNTER — APPOINTMENT (OUTPATIENT)
Dept: RADIOLOGY | Facility: CLINIC | Age: 72
End: 2022-01-01
Payer: MEDICARE

## 2022-01-01 ENCOUNTER — APPOINTMENT (OUTPATIENT)
Dept: MRI IMAGING | Facility: CLINIC | Age: 72
End: 2022-01-01
Payer: MEDICARE

## 2022-01-01 ENCOUNTER — INPATIENT (INPATIENT)
Facility: HOSPITAL | Age: 72
LOS: 3 days | Discharge: HOME | DRG: 189 | End: 2022-12-22
Attending: STUDENT IN AN ORGANIZED HEALTH CARE EDUCATION/TRAINING PROGRAM | Admitting: INTERNAL MEDICINE
Payer: MEDICARE

## 2022-01-01 ENCOUNTER — APPOINTMENT (OUTPATIENT)
Dept: HEMATOLOGY ONCOLOGY | Facility: CLINIC | Age: 72
End: 2022-01-01
Payer: MEDICARE

## 2022-01-01 VITALS
HEIGHT: 70 IN | BODY MASS INDEX: 25.62 KG/M2 | HEART RATE: 83 BPM | SYSTOLIC BLOOD PRESSURE: 162 MMHG | DIASTOLIC BLOOD PRESSURE: 90 MMHG | WEIGHT: 179 LBS | OXYGEN SATURATION: 97 %

## 2022-01-01 VITALS
WEIGHT: 173.01 LBS | DIASTOLIC BLOOD PRESSURE: 83 MMHG | OXYGEN SATURATION: 94 % | HEART RATE: 100 BPM | SYSTOLIC BLOOD PRESSURE: 169 MMHG | HEIGHT: 70 IN | BODY MASS INDEX: 24.77 KG/M2

## 2022-01-01 VITALS
SYSTOLIC BLOOD PRESSURE: 160 MMHG | WEIGHT: 180 LBS | DIASTOLIC BLOOD PRESSURE: 88 MMHG | BODY MASS INDEX: 25.77 KG/M2 | OXYGEN SATURATION: 97 % | HEIGHT: 70 IN | HEART RATE: 76 BPM

## 2022-01-01 VITALS
HEIGHT: 70 IN | HEART RATE: 84 BPM | DIASTOLIC BLOOD PRESSURE: 70 MMHG | OXYGEN SATURATION: 97 % | WEIGHT: 173 LBS | TEMPERATURE: 97.2 F | BODY MASS INDEX: 24.77 KG/M2 | SYSTOLIC BLOOD PRESSURE: 120 MMHG

## 2022-01-01 VITALS
WEIGHT: 175.06 LBS | HEART RATE: 82 BPM | BODY MASS INDEX: 25.06 KG/M2 | DIASTOLIC BLOOD PRESSURE: 81 MMHG | OXYGEN SATURATION: 97 % | SYSTOLIC BLOOD PRESSURE: 113 MMHG | HEIGHT: 70 IN

## 2022-01-01 VITALS
RESPIRATION RATE: 20 BRPM | WEIGHT: 169.98 LBS | DIASTOLIC BLOOD PRESSURE: 70 MMHG | HEIGHT: 71 IN | OXYGEN SATURATION: 90 % | TEMPERATURE: 98 F | HEART RATE: 109 BPM | SYSTOLIC BLOOD PRESSURE: 123 MMHG

## 2022-01-01 VITALS
WEIGHT: 171.96 LBS | OXYGEN SATURATION: 93 % | HEIGHT: 71 IN | HEART RATE: 118 BPM | TEMPERATURE: 98 F | RESPIRATION RATE: 16 BRPM | DIASTOLIC BLOOD PRESSURE: 91 MMHG | SYSTOLIC BLOOD PRESSURE: 124 MMHG

## 2022-01-01 VITALS
WEIGHT: 171 LBS | TEMPERATURE: 97.5 F | HEART RATE: 88 BPM | DIASTOLIC BLOOD PRESSURE: 80 MMHG | RESPIRATION RATE: 16 BRPM | SYSTOLIC BLOOD PRESSURE: 130 MMHG | HEIGHT: 70 IN | OXYGEN SATURATION: 98 % | BODY MASS INDEX: 24.48 KG/M2

## 2022-01-01 VITALS
RESPIRATION RATE: 18 BRPM | SYSTOLIC BLOOD PRESSURE: 118 MMHG | OXYGEN SATURATION: 95 % | DIASTOLIC BLOOD PRESSURE: 82 MMHG | TEMPERATURE: 98 F | HEART RATE: 94 BPM

## 2022-01-01 VITALS
WEIGHT: 171.03 LBS | SYSTOLIC BLOOD PRESSURE: 138 MMHG | OXYGEN SATURATION: 92 % | BODY MASS INDEX: 24.49 KG/M2 | DIASTOLIC BLOOD PRESSURE: 84 MMHG | HEIGHT: 70 IN | HEART RATE: 97 BPM

## 2022-01-01 VITALS
WEIGHT: 182 LBS | HEIGHT: 70 IN | OXYGEN SATURATION: 96 % | SYSTOLIC BLOOD PRESSURE: 160 MMHG | BODY MASS INDEX: 26.05 KG/M2 | HEART RATE: 95 BPM | DIASTOLIC BLOOD PRESSURE: 89 MMHG

## 2022-01-01 VITALS
WEIGHT: 179 LBS | DIASTOLIC BLOOD PRESSURE: 70 MMHG | HEIGHT: 70 IN | SYSTOLIC BLOOD PRESSURE: 120 MMHG | RESPIRATION RATE: 16 BRPM | BODY MASS INDEX: 25.62 KG/M2 | TEMPERATURE: 97.4 F | OXYGEN SATURATION: 97 % | HEART RATE: 104 BPM

## 2022-01-01 VITALS
HEART RATE: 115 BPM | OXYGEN SATURATION: 98 % | HEIGHT: 70 IN | WEIGHT: 180.25 LBS | DIASTOLIC BLOOD PRESSURE: 70 MMHG | SYSTOLIC BLOOD PRESSURE: 134 MMHG | TEMPERATURE: 97.4 F | BODY MASS INDEX: 25.8 KG/M2

## 2022-01-01 VITALS
RESPIRATION RATE: 16 BRPM | SYSTOLIC BLOOD PRESSURE: 136 MMHG | TEMPERATURE: 98.5 F | OXYGEN SATURATION: 95 % | DIASTOLIC BLOOD PRESSURE: 84 MMHG | HEART RATE: 96 BPM

## 2022-01-01 VITALS
SYSTOLIC BLOOD PRESSURE: 112 MMHG | TEMPERATURE: 98 F | OXYGEN SATURATION: 90 % | HEART RATE: 114 BPM | RESPIRATION RATE: 18 BRPM | DIASTOLIC BLOOD PRESSURE: 66 MMHG

## 2022-01-01 DIAGNOSIS — Z90.49 ACQUIRED ABSENCE OF OTHER SPECIFIED PARTS OF DIGESTIVE TRACT: Chronic | ICD-10-CM

## 2022-01-01 DIAGNOSIS — C61 MALIGNANT NEOPLASM OF PROSTATE: ICD-10-CM

## 2022-01-01 DIAGNOSIS — C79.51 SECONDARY MALIGNANT NEOPLASM OF BONE: ICD-10-CM

## 2022-01-01 DIAGNOSIS — M51.9 UNSPECIFIED THORACIC, THORACOLUMBAR AND LUMBOSACRAL INTERVERTEBRAL DISC DISORDER: ICD-10-CM

## 2022-01-01 DIAGNOSIS — S46.912A STRAIN OF UNSPECIFIED MUSCLE, FASCIA AND TENDON AT SHOULDER AND UPPER ARM LEVEL, LEFT ARM, INITIAL ENCOUNTER: ICD-10-CM

## 2022-01-01 DIAGNOSIS — R10.31 RIGHT LOWER QUADRANT PAIN: ICD-10-CM

## 2022-01-01 DIAGNOSIS — G89.3 NEOPLASM RELATED PAIN (ACUTE) (CHRONIC): ICD-10-CM

## 2022-01-01 DIAGNOSIS — E83.51 HYPOCALCEMIA: ICD-10-CM

## 2022-01-01 DIAGNOSIS — Z96.641 PRESENCE OF RIGHT ARTIFICIAL HIP JOINT: ICD-10-CM

## 2022-01-01 DIAGNOSIS — K40.90 UNILATERAL INGUINAL HERNIA, W/OUT OBSTRUCTION OR GANGRENE, NOT SPECIFIED AS RECURRENT: ICD-10-CM

## 2022-01-01 DIAGNOSIS — R97.20 ELEVATED PROSTATE, SPECIFIC ANTIGEN [PSA]: ICD-10-CM

## 2022-01-01 DIAGNOSIS — M25.551 PAIN IN RIGHT HIP: ICD-10-CM

## 2022-01-01 DIAGNOSIS — E83.39 OTHER DISORDERS OF PHOSPHORUS METABOLISM: ICD-10-CM

## 2022-01-01 DIAGNOSIS — M54.50 LOW BACK PAIN, UNSPECIFIED: ICD-10-CM

## 2022-01-01 DIAGNOSIS — J96.01 ACUTE RESPIRATORY FAILURE WITH HYPOXIA: ICD-10-CM

## 2022-01-01 DIAGNOSIS — K59.00 CONSTIPATION, UNSPECIFIED: ICD-10-CM

## 2022-01-01 DIAGNOSIS — R91.8 OTHER NONSPECIFIC ABNORMAL FINDING OF LUNG FIELD: ICD-10-CM

## 2022-01-01 DIAGNOSIS — C79.51 MALIGNANT NEOPLASM OF PROSTATE: ICD-10-CM

## 2022-01-01 DIAGNOSIS — M25.559 PAIN IN UNSPECIFIED HIP: ICD-10-CM

## 2022-01-01 DIAGNOSIS — K92.0 HEMATEMESIS: ICD-10-CM

## 2022-01-01 DIAGNOSIS — R19.09 OTHER INTRA-ABDOMINAL AND PELVIC SWELLING, MASS AND LUMP: ICD-10-CM

## 2022-01-01 LAB
-  AMPICILLIN: SIGNIFICANT CHANGE UP
-  AMPICILLIN: SIGNIFICANT CHANGE UP
-  CIPROFLOXACIN: SIGNIFICANT CHANGE UP
-  CIPROFLOXACIN: SIGNIFICANT CHANGE UP
-  LEVOFLOXACIN: SIGNIFICANT CHANGE UP
-  LEVOFLOXACIN: SIGNIFICANT CHANGE UP
-  NITROFURANTOIN: SIGNIFICANT CHANGE UP
-  NITROFURANTOIN: SIGNIFICANT CHANGE UP
-  TETRACYCLINE: SIGNIFICANT CHANGE UP
-  TETRACYCLINE: SIGNIFICANT CHANGE UP
-  VANCOMYCIN: SIGNIFICANT CHANGE UP
-  VANCOMYCIN: SIGNIFICANT CHANGE UP
ALBUMIN SERPL ELPH-MCNC: 2.7 G/DL — LOW (ref 3.3–5.2)
ALBUMIN SERPL ELPH-MCNC: 3.1 G/DL — LOW (ref 3.3–5.2)
ALBUMIN SERPL ELPH-MCNC: 3.7 G/DL — SIGNIFICANT CHANGE UP (ref 3.3–5.2)
ALBUMIN SERPL ELPH-MCNC: 4.5 G/DL
ALBUMIN SERPL ELPH-MCNC: 4.6 G/DL
ALBUMIN SERPL ELPH-MCNC: 4.7 G/DL
ALBUMIN SERPL ELPH-MCNC: 4.7 G/DL
ALBUMIN SERPL ELPH-MCNC: 5 G/DL
ALP BLD-CCNC: 144 U/L
ALP BLD-CCNC: 160 U/L
ALP BLD-CCNC: 186 U/L
ALP BLD-CCNC: 189 U/L
ALP BLD-CCNC: 219 U/L
ALP BLD-CCNC: 269 U/L
ALP BLD-CCNC: 330 U/L
ALP SERPL-CCNC: 132 U/L — HIGH (ref 40–120)
ALP SERPL-CCNC: 182 U/L — HIGH (ref 40–120)
ALP SERPL-CCNC: 195 U/L — HIGH (ref 40–120)
ALT FLD-CCNC: 10 U/L — SIGNIFICANT CHANGE UP
ALT FLD-CCNC: 11 U/L — SIGNIFICANT CHANGE UP
ALT FLD-CCNC: 11 U/L — SIGNIFICANT CHANGE UP
ALT SERPL-CCNC: 10 U/L
ALT SERPL-CCNC: 11 U/L
ALT SERPL-CCNC: 11 U/L
ALT SERPL-CCNC: 26 U/L
ALT SERPL-CCNC: 31 U/L
ANION GAP SERPL CALC-SCNC: 11 MMOL/L
ANION GAP SERPL CALC-SCNC: 12 MMOL/L
ANION GAP SERPL CALC-SCNC: 12 MMOL/L — SIGNIFICANT CHANGE UP (ref 5–17)
ANION GAP SERPL CALC-SCNC: 13 MMOL/L
ANION GAP SERPL CALC-SCNC: 13 MMOL/L — SIGNIFICANT CHANGE UP (ref 5–17)
ANION GAP SERPL CALC-SCNC: 13 MMOL/L — SIGNIFICANT CHANGE UP (ref 5–17)
ANION GAP SERPL CALC-SCNC: 14 MMOL/L
ANION GAP SERPL CALC-SCNC: 14 MMOL/L — SIGNIFICANT CHANGE UP (ref 5–17)
ANION GAP SERPL CALC-SCNC: 15 MMOL/L
ANION GAP SERPL CALC-SCNC: 15 MMOL/L — SIGNIFICANT CHANGE UP (ref 5–17)
ANION GAP SERPL CALC-SCNC: 16 MMOL/L — SIGNIFICANT CHANGE UP (ref 5–17)
ANION GAP SERPL CALC-SCNC: 17 MMOL/L — SIGNIFICANT CHANGE UP (ref 5–17)
ANION GAP SERPL CALC-SCNC: 17 MMOL/L — SIGNIFICANT CHANGE UP (ref 5–17)
APPEARANCE UR: ABNORMAL
APPEARANCE UR: CLEAR — SIGNIFICANT CHANGE UP
APTT BLD: 28.3 SEC — SIGNIFICANT CHANGE UP (ref 27.5–35.5)
APTT BLD: 30.8 SEC — SIGNIFICANT CHANGE UP (ref 27.5–35.5)
AST SERPL-CCNC: 11 U/L
AST SERPL-CCNC: 14 U/L
AST SERPL-CCNC: 14 U/L
AST SERPL-CCNC: 15 U/L
AST SERPL-CCNC: 20 U/L
AST SERPL-CCNC: 23 U/L
AST SERPL-CCNC: 33 U/L — SIGNIFICANT CHANGE UP
AST SERPL-CCNC: 35 U/L
AST SERPL-CCNC: 35 U/L — SIGNIFICANT CHANGE UP
AST SERPL-CCNC: 38 U/L — SIGNIFICANT CHANGE UP
BACTERIA # UR AUTO: ABNORMAL
BACTERIA # UR AUTO: ABNORMAL
BASE EXCESS BLDV CALC-SCNC: -2.4 MMOL/L — LOW (ref -2–3)
BASE EXCESS BLDV CALC-SCNC: 0.3 MMOL/L — SIGNIFICANT CHANGE UP (ref -2–3)
BASOPHILS # BLD AUTO: 0.04 K/UL — SIGNIFICANT CHANGE UP (ref 0–0.2)
BASOPHILS # BLD AUTO: 0.07 K/UL — SIGNIFICANT CHANGE UP (ref 0–0.2)
BASOPHILS # BLD AUTO: 0.08 K/UL — SIGNIFICANT CHANGE UP (ref 0–0.2)
BASOPHILS # BLD AUTO: 0.1 K/UL — SIGNIFICANT CHANGE UP (ref 0–0.2)
BASOPHILS # BLD AUTO: 0.25 K/UL — HIGH (ref 0–0.2)
BASOPHILS NFR BLD AUTO: 0.5 % — SIGNIFICANT CHANGE UP (ref 0–2)
BASOPHILS NFR BLD AUTO: 0.6 % — SIGNIFICANT CHANGE UP (ref 0–2)
BASOPHILS NFR BLD AUTO: 0.7 % — SIGNIFICANT CHANGE UP (ref 0–2)
BASOPHILS NFR BLD AUTO: 0.8 % — SIGNIFICANT CHANGE UP (ref 0–2)
BASOPHILS NFR BLD AUTO: 1 % — SIGNIFICANT CHANGE UP (ref 0–2)
BASOPHILS NFR BLD AUTO: 1.1 % — SIGNIFICANT CHANGE UP (ref 0–2)
BASOPHILS NFR BLD AUTO: 1.2 % — SIGNIFICANT CHANGE UP (ref 0–2)
BASOPHILS NFR BLD AUTO: 1.2 % — SIGNIFICANT CHANGE UP (ref 0–2)
BASOPHILS NFR BLD AUTO: 1.7 % — SIGNIFICANT CHANGE UP (ref 0–2)
BILIRUB SERPL-MCNC: 0.3 MG/DL
BILIRUB SERPL-MCNC: 0.4 MG/DL
BILIRUB SERPL-MCNC: 0.4 MG/DL — SIGNIFICANT CHANGE UP (ref 0.4–2)
BILIRUB SERPL-MCNC: 0.5 MG/DL — SIGNIFICANT CHANGE UP (ref 0.4–2)
BILIRUB SERPL-MCNC: 0.5 MG/DL — SIGNIFICANT CHANGE UP (ref 0.4–2)
BILIRUB SERPL-MCNC: 0.6 MG/DL
BILIRUB UR-MCNC: NEGATIVE — SIGNIFICANT CHANGE UP
BILIRUB UR-MCNC: NEGATIVE — SIGNIFICANT CHANGE UP
BLD GP AB SCN SERPL QL: SIGNIFICANT CHANGE UP
BUN SERPL-MCNC: 11.2 MG/DL — SIGNIFICANT CHANGE UP (ref 8–20)
BUN SERPL-MCNC: 11.7 MG/DL — SIGNIFICANT CHANGE UP (ref 8–20)
BUN SERPL-MCNC: 12.2 MG/DL — SIGNIFICANT CHANGE UP (ref 8–20)
BUN SERPL-MCNC: 14.4 MG/DL — SIGNIFICANT CHANGE UP (ref 8–20)
BUN SERPL-MCNC: 15.4 MG/DL — SIGNIFICANT CHANGE UP (ref 8–20)
BUN SERPL-MCNC: 16 MG/DL
BUN SERPL-MCNC: 16 MG/DL
BUN SERPL-MCNC: 16.8 MG/DL — SIGNIFICANT CHANGE UP (ref 8–20)
BUN SERPL-MCNC: 17 MG/DL
BUN SERPL-MCNC: 18 MG/DL
BUN SERPL-MCNC: 18 MG/DL — SIGNIFICANT CHANGE UP (ref 8–20)
BUN SERPL-MCNC: 18.3 MG/DL — SIGNIFICANT CHANGE UP (ref 8–20)
BUN SERPL-MCNC: 19 MG/DL
BUN SERPL-MCNC: 19 MG/DL
BUN SERPL-MCNC: 19.4 MG/DL — SIGNIFICANT CHANGE UP (ref 8–20)
BUN SERPL-MCNC: 21.7 MG/DL — HIGH (ref 8–20)
BUN SERPL-MCNC: 26 MG/DL
CA-I SERPL-SCNC: 1.11 MMOL/L — LOW (ref 1.15–1.33)
CALCIUM SERPL-MCNC: 7.1 MG/DL — LOW (ref 8.4–10.5)
CALCIUM SERPL-MCNC: 7.4 MG/DL — LOW (ref 8.4–10.5)
CALCIUM SERPL-MCNC: 7.4 MG/DL — LOW (ref 8.4–10.5)
CALCIUM SERPL-MCNC: 7.7 MG/DL — LOW (ref 8.4–10.5)
CALCIUM SERPL-MCNC: 7.8 MG/DL
CALCIUM SERPL-MCNC: 8.1 MG/DL — LOW (ref 8.4–10.5)
CALCIUM SERPL-MCNC: 8.4 MG/DL — SIGNIFICANT CHANGE UP (ref 8.4–10.5)
CALCIUM SERPL-MCNC: 8.5 MG/DL
CALCIUM SERPL-MCNC: 8.5 MG/DL — SIGNIFICANT CHANGE UP (ref 8.4–10.5)
CALCIUM SERPL-MCNC: 8.5 MG/DL — SIGNIFICANT CHANGE UP (ref 8.4–10.5)
CALCIUM SERPL-MCNC: 8.6 MG/DL — SIGNIFICANT CHANGE UP (ref 8.4–10.5)
CALCIUM SERPL-MCNC: 8.8 MG/DL
CALCIUM SERPL-MCNC: 8.8 MG/DL
CALCIUM SERPL-MCNC: 8.9 MG/DL — SIGNIFICANT CHANGE UP (ref 8.4–10.5)
CALCIUM SERPL-MCNC: 9.1 MG/DL
CALCIUM SERPL-MCNC: 9.1 MG/DL
CALCIUM SERPL-MCNC: 9.2 MG/DL
CHLORIDE BLDV-SCNC: 104 MMOL/L — SIGNIFICANT CHANGE UP (ref 96–108)
CHLORIDE SERPL-SCNC: 100 MMOL/L — SIGNIFICANT CHANGE UP (ref 96–108)
CHLORIDE SERPL-SCNC: 101 MMOL/L
CHLORIDE SERPL-SCNC: 101 MMOL/L — SIGNIFICANT CHANGE UP (ref 96–108)
CHLORIDE SERPL-SCNC: 101 MMOL/L — SIGNIFICANT CHANGE UP (ref 96–108)
CHLORIDE SERPL-SCNC: 103 MMOL/L
CHLORIDE SERPL-SCNC: 103 MMOL/L — SIGNIFICANT CHANGE UP (ref 96–108)
CHLORIDE SERPL-SCNC: 103 MMOL/L — SIGNIFICANT CHANGE UP (ref 96–108)
CHLORIDE SERPL-SCNC: 104 MMOL/L
CHLORIDE SERPL-SCNC: 106 MMOL/L
CHLORIDE SERPL-SCNC: 108 MMOL/L
CHLORIDE SERPL-SCNC: 96 MMOL/L — SIGNIFICANT CHANGE UP (ref 96–108)
CHLORIDE SERPL-SCNC: 97 MMOL/L — SIGNIFICANT CHANGE UP (ref 96–108)
CHLORIDE SERPL-SCNC: 97 MMOL/L — SIGNIFICANT CHANGE UP (ref 96–108)
CHLORIDE SERPL-SCNC: 98 MMOL/L — SIGNIFICANT CHANGE UP (ref 96–108)
CHLORIDE SERPL-SCNC: 98 MMOL/L — SIGNIFICANT CHANGE UP (ref 96–108)
CO2 SERPL-SCNC: 20 MMOL/L — LOW (ref 22–29)
CO2 SERPL-SCNC: 21 MMOL/L
CO2 SERPL-SCNC: 21 MMOL/L — LOW (ref 22–29)
CO2 SERPL-SCNC: 22 MMOL/L — SIGNIFICANT CHANGE UP (ref 22–29)
CO2 SERPL-SCNC: 22 MMOL/L — SIGNIFICANT CHANGE UP (ref 22–29)
CO2 SERPL-SCNC: 23 MMOL/L
CO2 SERPL-SCNC: 23 MMOL/L
CO2 SERPL-SCNC: 23 MMOL/L — SIGNIFICANT CHANGE UP (ref 22–29)
CO2 SERPL-SCNC: 24 MMOL/L
CO2 SERPL-SCNC: 24 MMOL/L — SIGNIFICANT CHANGE UP (ref 22–29)
CO2 SERPL-SCNC: 24 MMOL/L — SIGNIFICANT CHANGE UP (ref 22–29)
CO2 SERPL-SCNC: 25 MMOL/L
CO2 SERPL-SCNC: 25 MMOL/L
CO2 SERPL-SCNC: 27 MMOL/L
COLOR SPEC: YELLOW — SIGNIFICANT CHANGE UP
COLOR SPEC: YELLOW — SIGNIFICANT CHANGE UP
COMMENT - URINE: SIGNIFICANT CHANGE UP
CREAT SERPL-MCNC: 0.6 MG/DL — SIGNIFICANT CHANGE UP (ref 0.5–1.3)
CREAT SERPL-MCNC: 0.61 MG/DL — SIGNIFICANT CHANGE UP (ref 0.5–1.3)
CREAT SERPL-MCNC: 0.64 MG/DL — SIGNIFICANT CHANGE UP (ref 0.5–1.3)
CREAT SERPL-MCNC: 0.65 MG/DL — SIGNIFICANT CHANGE UP (ref 0.5–1.3)
CREAT SERPL-MCNC: 0.66 MG/DL — SIGNIFICANT CHANGE UP (ref 0.5–1.3)
CREAT SERPL-MCNC: 0.68 MG/DL — SIGNIFICANT CHANGE UP (ref 0.5–1.3)
CREAT SERPL-MCNC: 0.68 MG/DL — SIGNIFICANT CHANGE UP (ref 0.5–1.3)
CREAT SERPL-MCNC: 0.7 MG/DL — SIGNIFICANT CHANGE UP (ref 0.5–1.3)
CREAT SERPL-MCNC: 0.73 MG/DL — SIGNIFICANT CHANGE UP (ref 0.5–1.3)
CREAT SERPL-MCNC: 0.75 MG/DL — SIGNIFICANT CHANGE UP (ref 0.5–1.3)
CREAT SERPL-MCNC: 0.8 MG/DL
CREAT SERPL-MCNC: 0.84 MG/DL
CREAT SERPL-MCNC: 0.87 MG/DL
CREAT SERPL-MCNC: 0.88 MG/DL
CREAT SERPL-MCNC: 0.91 MG/DL
CREAT SERPL-MCNC: 0.91 MG/DL
CREAT SERPL-MCNC: 0.96 MG/DL
CULTURE RESULTS: SIGNIFICANT CHANGE UP
D DIMER BLD IA.RAPID-MCNC: 492 NG/ML DDU — HIGH
DIFF PNL FLD: ABNORMAL
DIFF PNL FLD: ABNORMAL
EGFR: 100 ML/MIN/1.73M2 — SIGNIFICANT CHANGE UP
EGFR: 101 ML/MIN/1.73M2 — SIGNIFICANT CHANGE UP
EGFR: 102 ML/MIN/1.73M2 — SIGNIFICANT CHANGE UP
EGFR: 83 ML/MIN/1.73M2
EGFR: 90 ML/MIN/1.73M2
EGFR: 91 ML/MIN/1.73M2
EGFR: 92 ML/MIN/1.73M2
EGFR: 93 ML/MIN/1.73M2
EGFR: 95 ML/MIN/1.73M2 — SIGNIFICANT CHANGE UP
EGFR: 96 ML/MIN/1.73M2 — SIGNIFICANT CHANGE UP
EGFR: 97 ML/MIN/1.73M2 — SIGNIFICANT CHANGE UP
EGFR: 98 ML/MIN/1.73M2 — SIGNIFICANT CHANGE UP
EGFR: 98 ML/MIN/1.73M2 — SIGNIFICANT CHANGE UP
EGFR: 99 ML/MIN/1.73M2 — SIGNIFICANT CHANGE UP
EGFR: 99 ML/MIN/1.73M2 — SIGNIFICANT CHANGE UP
EOSINOPHIL # BLD AUTO: 0 K/UL — SIGNIFICANT CHANGE UP (ref 0–0.5)
EOSINOPHIL # BLD AUTO: 0 K/UL — SIGNIFICANT CHANGE UP (ref 0–0.5)
EOSINOPHIL # BLD AUTO: 0.04 K/UL — SIGNIFICANT CHANGE UP (ref 0–0.5)
EOSINOPHIL # BLD AUTO: 0.07 K/UL — SIGNIFICANT CHANGE UP (ref 0–0.5)
EOSINOPHIL # BLD AUTO: 0.1 K/UL — SIGNIFICANT CHANGE UP (ref 0–0.5)
EOSINOPHIL # BLD AUTO: 0.21 K/UL — SIGNIFICANT CHANGE UP (ref 0–0.5)
EOSINOPHIL NFR BLD AUTO: 0 % — SIGNIFICANT CHANGE UP (ref 0–6)
EOSINOPHIL NFR BLD AUTO: 0.3 % — SIGNIFICANT CHANGE UP (ref 0–6)
EOSINOPHIL NFR BLD AUTO: 0.4 % — SIGNIFICANT CHANGE UP (ref 0–6)
EOSINOPHIL NFR BLD AUTO: 0.7 % — SIGNIFICANT CHANGE UP (ref 0–6)
EOSINOPHIL NFR BLD AUTO: 0.9 % — SIGNIFICANT CHANGE UP (ref 0–6)
EOSINOPHIL NFR BLD AUTO: 1.1 % — SIGNIFICANT CHANGE UP (ref 0–6)
EOSINOPHIL NFR BLD AUTO: 1.9 % — SIGNIFICANT CHANGE UP (ref 0–6)
EOSINOPHIL NFR BLD AUTO: 2 % — SIGNIFICANT CHANGE UP (ref 0–6)
EOSINOPHIL NFR BLD AUTO: 2.2 % — SIGNIFICANT CHANGE UP (ref 0–6)
EPI CELLS # UR: SIGNIFICANT CHANGE UP
EPI CELLS # UR: SIGNIFICANT CHANGE UP
GAS PNL BLDV: 138 MMOL/L — SIGNIFICANT CHANGE UP (ref 136–145)
GAS PNL BLDV: SIGNIFICANT CHANGE UP
GIANT PLATELETS BLD QL SMEAR: PRESENT — SIGNIFICANT CHANGE UP
GLUCOSE BLDV-MCNC: 114 MG/DL — HIGH (ref 70–99)
GLUCOSE SERPL-MCNC: 104 MG/DL — HIGH (ref 70–99)
GLUCOSE SERPL-MCNC: 115 MG/DL
GLUCOSE SERPL-MCNC: 115 MG/DL
GLUCOSE SERPL-MCNC: 116 MG/DL — HIGH (ref 70–99)
GLUCOSE SERPL-MCNC: 119 MG/DL
GLUCOSE SERPL-MCNC: 119 MG/DL — HIGH (ref 70–99)
GLUCOSE SERPL-MCNC: 124 MG/DL
GLUCOSE SERPL-MCNC: 125 MG/DL
GLUCOSE SERPL-MCNC: 130 MG/DL — HIGH (ref 70–99)
GLUCOSE SERPL-MCNC: 137 MG/DL
GLUCOSE SERPL-MCNC: 85 MG/DL — SIGNIFICANT CHANGE UP (ref 70–99)
GLUCOSE SERPL-MCNC: 89 MG/DL — SIGNIFICANT CHANGE UP (ref 70–99)
GLUCOSE SERPL-MCNC: 91 MG/DL — SIGNIFICANT CHANGE UP (ref 70–99)
GLUCOSE SERPL-MCNC: 92 MG/DL — SIGNIFICANT CHANGE UP (ref 70–99)
GLUCOSE SERPL-MCNC: 94 MG/DL
GLUCOSE SERPL-MCNC: 95 MG/DL — SIGNIFICANT CHANGE UP (ref 70–99)
GLUCOSE SERPL-MCNC: 97 MG/DL — SIGNIFICANT CHANGE UP (ref 70–99)
GLUCOSE UR QL: NEGATIVE MG/DL — SIGNIFICANT CHANGE UP
GLUCOSE UR QL: NEGATIVE MG/DL — SIGNIFICANT CHANGE UP
GRAN CASTS # UR COMP ASSIST: ABNORMAL /LPF
HCO3 BLDV-SCNC: 22 MMOL/L — SIGNIFICANT CHANGE UP (ref 22–29)
HCO3 BLDV-SCNC: 25 MMOL/L — SIGNIFICANT CHANGE UP (ref 22–29)
HCT VFR BLD CALC: 30.6 % — LOW (ref 39–50)
HCT VFR BLD CALC: 31.2 % — LOW (ref 39–50)
HCT VFR BLD CALC: 31.3 % — LOW (ref 39–50)
HCT VFR BLD CALC: 31.4 % — LOW (ref 39–50)
HCT VFR BLD CALC: 31.9 % — LOW (ref 39–50)
HCT VFR BLD CALC: 32.3 % — LOW (ref 39–50)
HCT VFR BLD CALC: 32.4 % — LOW (ref 39–50)
HCT VFR BLD CALC: 32.7 % — LOW (ref 39–50)
HCT VFR BLD CALC: 33.1 % — LOW (ref 39–50)
HCT VFR BLD CALC: 33.4 % — LOW (ref 39–50)
HCT VFR BLD CALC: 38.4 % — LOW (ref 39–50)
HCT VFR BLD CALC: 39.6 % — SIGNIFICANT CHANGE UP (ref 39–50)
HCT VFR BLD CALC: 40.5 % — SIGNIFICANT CHANGE UP (ref 39–50)
HCT VFR BLD CALC: 41 % — SIGNIFICANT CHANGE UP (ref 39–50)
HCT VFR BLD CALC: 44.2 % — SIGNIFICANT CHANGE UP (ref 39–50)
HCT VFR BLD CALC: 44.4 % — SIGNIFICANT CHANGE UP (ref 39–50)
HCT VFR BLD CALC: 44.8 % — SIGNIFICANT CHANGE UP (ref 39–50)
HCT VFR BLDA CALC: 34 % — SIGNIFICANT CHANGE UP
HGB BLD CALC-MCNC: 11.3 G/DL — LOW (ref 12.6–17.4)
HGB BLD-MCNC: 10.3 G/DL — LOW (ref 13–17)
HGB BLD-MCNC: 10.3 G/DL — LOW (ref 13–17)
HGB BLD-MCNC: 10.4 G/DL — LOW (ref 13–17)
HGB BLD-MCNC: 10.4 G/DL — LOW (ref 13–17)
HGB BLD-MCNC: 10.5 G/DL — LOW (ref 13–17)
HGB BLD-MCNC: 10.6 G/DL — LOW (ref 13–17)
HGB BLD-MCNC: 11 G/DL — LOW (ref 13–17)
HGB BLD-MCNC: 11 G/DL — LOW (ref 13–17)
HGB BLD-MCNC: 11.1 G/DL — LOW (ref 13–17)
HGB BLD-MCNC: 11.1 G/DL — LOW (ref 13–17)
HGB BLD-MCNC: 12.6 G/DL — LOW (ref 13–17)
HGB BLD-MCNC: 12.8 G/DL — LOW (ref 13–17)
HGB BLD-MCNC: 13.2 G/DL — SIGNIFICANT CHANGE UP (ref 13–17)
HGB BLD-MCNC: 13.2 G/DL — SIGNIFICANT CHANGE UP (ref 13–17)
HGB BLD-MCNC: 13.9 G/DL — SIGNIFICANT CHANGE UP (ref 13–17)
HGB BLD-MCNC: 14.3 G/DL — SIGNIFICANT CHANGE UP (ref 13–17)
HGB BLD-MCNC: 14.4 G/DL — SIGNIFICANT CHANGE UP (ref 13–17)
IMM GRANULOCYTES NFR BLD AUTO: 0.9 % — SIGNIFICANT CHANGE UP (ref 0–0.9)
IMM GRANULOCYTES NFR BLD AUTO: 1 % — HIGH (ref 0–0.9)
IMM GRANULOCYTES NFR BLD AUTO: 4.7 % — HIGH (ref 0–0.9)
INR BLD: 1.26 RATIO — HIGH (ref 0.88–1.16)
INR BLD: 1.31 RATIO — HIGH (ref 0.88–1.16)
INR BLD: 1.32 RATIO — HIGH (ref 0.88–1.16)
KETONES UR-MCNC: ABNORMAL
KETONES UR-MCNC: ABNORMAL
LACTATE BLDV-MCNC: 1.7 MMOL/L — SIGNIFICANT CHANGE UP (ref 0.5–2)
LDH SERPL-CCNC: 214 U/L
LDH SERPL-CCNC: 218 U/L
LDH SERPL-CCNC: 226 U/L
LDH SERPL-CCNC: 239 U/L
LEUKOCYTE ESTERASE UR-ACNC: ABNORMAL
LEUKOCYTE ESTERASE UR-ACNC: NEGATIVE — SIGNIFICANT CHANGE UP
LIDOCAIN IGE QN: 11 U/L — LOW (ref 22–51)
LYMPHOCYTES # BLD AUTO: 0.91 K/UL — LOW (ref 1–3.3)
LYMPHOCYTES # BLD AUTO: 1.13 K/UL — SIGNIFICANT CHANGE UP (ref 1–3.3)
LYMPHOCYTES # BLD AUTO: 1.2 K/UL — SIGNIFICANT CHANGE UP (ref 1–3.3)
LYMPHOCYTES # BLD AUTO: 1.29 K/UL — SIGNIFICANT CHANGE UP (ref 1–3.3)
LYMPHOCYTES # BLD AUTO: 1.39 K/UL — SIGNIFICANT CHANGE UP (ref 1–3.3)
LYMPHOCYTES # BLD AUTO: 1.4 K/UL — SIGNIFICANT CHANGE UP (ref 1–3.3)
LYMPHOCYTES # BLD AUTO: 1.6 K/UL — SIGNIFICANT CHANGE UP (ref 1–3.3)
LYMPHOCYTES # BLD AUTO: 1.6 K/UL — SIGNIFICANT CHANGE UP (ref 1–3.3)
LYMPHOCYTES # BLD AUTO: 1.8 K/UL — SIGNIFICANT CHANGE UP (ref 1–3.3)
LYMPHOCYTES # BLD AUTO: 11.2 % — LOW (ref 13–44)
LYMPHOCYTES # BLD AUTO: 14.8 % — SIGNIFICANT CHANGE UP (ref 13–44)
LYMPHOCYTES # BLD AUTO: 15.8 % — SIGNIFICANT CHANGE UP (ref 13–44)
LYMPHOCYTES # BLD AUTO: 19 % — SIGNIFICANT CHANGE UP (ref 13–44)
LYMPHOCYTES # BLD AUTO: 19.3 % — SIGNIFICANT CHANGE UP (ref 13–44)
LYMPHOCYTES # BLD AUTO: 19.9 % — SIGNIFICANT CHANGE UP (ref 13–44)
LYMPHOCYTES # BLD AUTO: 2 K/UL — SIGNIFICANT CHANGE UP (ref 1–3.3)
LYMPHOCYTES # BLD AUTO: 2.1 K/UL — SIGNIFICANT CHANGE UP (ref 1–3.3)
LYMPHOCYTES # BLD AUTO: 24.4 % — SIGNIFICANT CHANGE UP (ref 13–44)
LYMPHOCYTES # BLD AUTO: 26.1 % — SIGNIFICANT CHANGE UP (ref 13–44)
LYMPHOCYTES # BLD AUTO: 28.5 % — SIGNIFICANT CHANGE UP (ref 13–44)
LYMPHOCYTES # BLD AUTO: 8.6 % — LOW (ref 13–44)
LYMPHOCYTES # BLD AUTO: 8.7 % — LOW (ref 13–44)
MAGNESIUM SERPL-MCNC: 1.9 MG/DL — SIGNIFICANT CHANGE UP (ref 1.6–2.6)
MAGNESIUM SERPL-MCNC: 2 MG/DL
MAGNESIUM SERPL-MCNC: 2.1 MG/DL
MAGNESIUM SERPL-MCNC: 2.1 MG/DL
MAGNESIUM SERPL-MCNC: 2.1 MG/DL — SIGNIFICANT CHANGE UP (ref 1.6–2.6)
MAGNESIUM SERPL-MCNC: 2.1 MG/DL — SIGNIFICANT CHANGE UP (ref 1.8–2.6)
MAGNESIUM SERPL-MCNC: 2.2 MG/DL
MAGNESIUM SERPL-MCNC: 2.2 MG/DL — SIGNIFICANT CHANGE UP (ref 1.6–2.6)
MAGNESIUM SERPL-MCNC: 2.3 MG/DL
MAGNESIUM SERPL-MCNC: 2.3 MG/DL — SIGNIFICANT CHANGE UP (ref 1.6–2.6)
MAGNESIUM SERPL-MCNC: 2.4 MG/DL
MAGNESIUM SERPL-MCNC: 2.4 MG/DL
MAGNESIUM SERPL-MCNC: 2.5 MG/DL — SIGNIFICANT CHANGE UP (ref 1.6–2.6)
MANUAL SMEAR VERIFICATION: SIGNIFICANT CHANGE UP
MCHC RBC-ENTMCNC: 28.2 PG — SIGNIFICANT CHANGE UP (ref 27–34)
MCHC RBC-ENTMCNC: 28.5 PG — SIGNIFICANT CHANGE UP (ref 27–34)
MCHC RBC-ENTMCNC: 28.6 PG — SIGNIFICANT CHANGE UP (ref 27–34)
MCHC RBC-ENTMCNC: 28.8 PG — SIGNIFICANT CHANGE UP (ref 27–34)
MCHC RBC-ENTMCNC: 29 PG — SIGNIFICANT CHANGE UP (ref 27–34)
MCHC RBC-ENTMCNC: 29 PG — SIGNIFICANT CHANGE UP (ref 27–34)
MCHC RBC-ENTMCNC: 29.1 PG — SIGNIFICANT CHANGE UP (ref 27–34)
MCHC RBC-ENTMCNC: 29.1 PG — SIGNIFICANT CHANGE UP (ref 27–34)
MCHC RBC-ENTMCNC: 29.2 PG — SIGNIFICANT CHANGE UP (ref 27–34)
MCHC RBC-ENTMCNC: 29.6 PG — SIGNIFICANT CHANGE UP (ref 27–34)
MCHC RBC-ENTMCNC: 29.9 PG — SIGNIFICANT CHANGE UP (ref 27–34)
MCHC RBC-ENTMCNC: 30 PG — SIGNIFICANT CHANGE UP (ref 27–34)
MCHC RBC-ENTMCNC: 30.5 PG — SIGNIFICANT CHANGE UP (ref 27–34)
MCHC RBC-ENTMCNC: 30.6 PG — SIGNIFICANT CHANGE UP (ref 27–34)
MCHC RBC-ENTMCNC: 30.8 PG — SIGNIFICANT CHANGE UP (ref 27–34)
MCHC RBC-ENTMCNC: 31.4 GM/DL — LOW (ref 32–36)
MCHC RBC-ENTMCNC: 31.6 G/DL — LOW (ref 32–36)
MCHC RBC-ENTMCNC: 31.8 G/DL — LOW (ref 32–36)
MCHC RBC-ENTMCNC: 32 G/DL — SIGNIFICANT CHANGE UP (ref 32–36)
MCHC RBC-ENTMCNC: 32.3 G/DL — SIGNIFICANT CHANGE UP (ref 32–36)
MCHC RBC-ENTMCNC: 32.5 G/DL — SIGNIFICANT CHANGE UP (ref 32–36)
MCHC RBC-ENTMCNC: 32.7 G/DL — SIGNIFICANT CHANGE UP (ref 32–36)
MCHC RBC-ENTMCNC: 32.7 GM/DL — SIGNIFICANT CHANGE UP (ref 32–36)
MCHC RBC-ENTMCNC: 32.8 GM/DL — SIGNIFICANT CHANGE UP (ref 32–36)
MCHC RBC-ENTMCNC: 33 GM/DL — SIGNIFICANT CHANGE UP (ref 32–36)
MCHC RBC-ENTMCNC: 33.2 G/DL — SIGNIFICANT CHANGE UP (ref 32–36)
MCHC RBC-ENTMCNC: 33.2 GM/DL — SIGNIFICANT CHANGE UP (ref 32–36)
MCHC RBC-ENTMCNC: 33.5 GM/DL — SIGNIFICANT CHANGE UP (ref 32–36)
MCHC RBC-ENTMCNC: 33.6 GM/DL — SIGNIFICANT CHANGE UP (ref 32–36)
MCHC RBC-ENTMCNC: 34 GM/DL — SIGNIFICANT CHANGE UP (ref 32–36)
MCHC RBC-ENTMCNC: 34.1 GM/DL — SIGNIFICANT CHANGE UP (ref 32–36)
MCHC RBC-ENTMCNC: 34.8 GM/DL — SIGNIFICANT CHANGE UP (ref 32–36)
MCV RBC AUTO: 85.1 FL — SIGNIFICANT CHANGE UP (ref 80–100)
MCV RBC AUTO: 85.4 FL — SIGNIFICANT CHANGE UP (ref 80–100)
MCV RBC AUTO: 86 FL — SIGNIFICANT CHANGE UP (ref 80–100)
MCV RBC AUTO: 86.4 FL — SIGNIFICANT CHANGE UP (ref 80–100)
MCV RBC AUTO: 86.7 FL — SIGNIFICANT CHANGE UP (ref 80–100)
MCV RBC AUTO: 87 FL — SIGNIFICANT CHANGE UP (ref 80–100)
MCV RBC AUTO: 89.3 FL — SIGNIFICANT CHANGE UP (ref 80–100)
MCV RBC AUTO: 89.8 FL — SIGNIFICANT CHANGE UP (ref 80–100)
MCV RBC AUTO: 90.1 FL — SIGNIFICANT CHANGE UP (ref 80–100)
MCV RBC AUTO: 90.5 FL — SIGNIFICANT CHANGE UP (ref 80–100)
MCV RBC AUTO: 91.8 FL — SIGNIFICANT CHANGE UP (ref 80–100)
MCV RBC AUTO: 92.1 FL — SIGNIFICANT CHANGE UP (ref 80–100)
MCV RBC AUTO: 93.6 FL — SIGNIFICANT CHANGE UP (ref 80–100)
MCV RBC AUTO: 95.5 FL — SIGNIFICANT CHANGE UP (ref 80–100)
MCV RBC AUTO: 96 FL — SIGNIFICANT CHANGE UP (ref 80–100)
METAMYELOCYTES # FLD: 0.9 % — HIGH (ref 0–0)
METHOD TYPE: SIGNIFICANT CHANGE UP
METHOD TYPE: SIGNIFICANT CHANGE UP
MONOCYTES # BLD AUTO: 0.5 K/UL — SIGNIFICANT CHANGE UP (ref 0–0.9)
MONOCYTES # BLD AUTO: 0.6 K/UL — SIGNIFICANT CHANGE UP (ref 0–0.9)
MONOCYTES # BLD AUTO: 0.7 K/UL — SIGNIFICANT CHANGE UP (ref 0–0.9)
MONOCYTES # BLD AUTO: 0.7 K/UL — SIGNIFICANT CHANGE UP (ref 0–0.9)
MONOCYTES # BLD AUTO: 0.96 K/UL — HIGH (ref 0–0.9)
MONOCYTES # BLD AUTO: 0.98 K/UL — HIGH (ref 0–0.9)
MONOCYTES # BLD AUTO: 1.03 K/UL — HIGH (ref 0–0.9)
MONOCYTES # BLD AUTO: 1.1 K/UL — HIGH (ref 0–0.9)
MONOCYTES # BLD AUTO: 1.16 K/UL — HIGH (ref 0–0.9)
MONOCYTES NFR BLD AUTO: 10.4 % — SIGNIFICANT CHANGE UP (ref 2–14)
MONOCYTES NFR BLD AUTO: 11.9 % — SIGNIFICANT CHANGE UP (ref 2–14)
MONOCYTES NFR BLD AUTO: 13.2 % — SIGNIFICANT CHANGE UP (ref 2–14)
MONOCYTES NFR BLD AUTO: 7 % — SIGNIFICANT CHANGE UP (ref 2–14)
MONOCYTES NFR BLD AUTO: 7.1 % — SIGNIFICANT CHANGE UP (ref 2–14)
MONOCYTES NFR BLD AUTO: 7.2 % — SIGNIFICANT CHANGE UP (ref 2–14)
MONOCYTES NFR BLD AUTO: 7.3 % — SIGNIFICANT CHANGE UP (ref 2–14)
MONOCYTES NFR BLD AUTO: 7.7 % — SIGNIFICANT CHANGE UP (ref 2–14)
MONOCYTES NFR BLD AUTO: 8.8 % — SIGNIFICANT CHANGE UP (ref 2–14)
MONOCYTES NFR BLD AUTO: 9.3 % — SIGNIFICANT CHANGE UP (ref 2–14)
MONOCYTES NFR BLD AUTO: 9.7 % — SIGNIFICANT CHANGE UP (ref 2–14)
NEUTROPHILS # BLD AUTO: 10.11 K/UL — HIGH (ref 1.8–7.4)
NEUTROPHILS # BLD AUTO: 11.94 K/UL — HIGH (ref 1.8–7.4)
NEUTROPHILS # BLD AUTO: 4.4 K/UL — SIGNIFICANT CHANGE UP (ref 1.8–7.4)
NEUTROPHILS # BLD AUTO: 4.6 K/UL — SIGNIFICANT CHANGE UP (ref 1.8–7.4)
NEUTROPHILS # BLD AUTO: 4.7 K/UL — SIGNIFICANT CHANGE UP (ref 1.8–7.4)
NEUTROPHILS # BLD AUTO: 5.3 K/UL — SIGNIFICANT CHANGE UP (ref 1.8–7.4)
NEUTROPHILS # BLD AUTO: 5.6 K/UL — SIGNIFICANT CHANGE UP (ref 1.8–7.4)
NEUTROPHILS # BLD AUTO: 5.8 K/UL — SIGNIFICANT CHANGE UP (ref 1.8–7.4)
NEUTROPHILS # BLD AUTO: 5.9 K/UL — SIGNIFICANT CHANGE UP (ref 1.8–7.4)
NEUTROPHILS # BLD AUTO: 6.04 K/UL — SIGNIFICANT CHANGE UP (ref 1.8–7.4)
NEUTROPHILS # BLD AUTO: 6.68 K/UL — SIGNIFICANT CHANGE UP (ref 1.8–7.4)
NEUTROPHILS NFR BLD AUTO: 59.8 % — SIGNIFICANT CHANGE UP (ref 43–77)
NEUTROPHILS NFR BLD AUTO: 61.6 % — SIGNIFICANT CHANGE UP (ref 43–77)
NEUTROPHILS NFR BLD AUTO: 64.8 % — SIGNIFICANT CHANGE UP (ref 43–77)
NEUTROPHILS NFR BLD AUTO: 65.5 % — SIGNIFICANT CHANGE UP (ref 43–77)
NEUTROPHILS NFR BLD AUTO: 70.9 % — SIGNIFICANT CHANGE UP (ref 43–77)
NEUTROPHILS NFR BLD AUTO: 71.1 % — SIGNIFICANT CHANGE UP (ref 43–77)
NEUTROPHILS NFR BLD AUTO: 71.7 % — SIGNIFICANT CHANGE UP (ref 43–77)
NEUTROPHILS NFR BLD AUTO: 74.6 % — SIGNIFICANT CHANGE UP (ref 43–77)
NEUTROPHILS NFR BLD AUTO: 75.7 % — SIGNIFICANT CHANGE UP (ref 43–77)
NEUTROPHILS NFR BLD AUTO: 77 % — SIGNIFICANT CHANGE UP (ref 43–77)
NEUTROPHILS NFR BLD AUTO: 79.1 % — HIGH (ref 43–77)
NEUTS BAND # BLD: 1.7 % — SIGNIFICANT CHANGE UP (ref 0–8)
NITRITE UR-MCNC: NEGATIVE — SIGNIFICANT CHANGE UP
NITRITE UR-MCNC: NEGATIVE — SIGNIFICANT CHANGE UP
NRBC # BLD: 1 /100 WBCS — HIGH (ref 0–0)
NRBC # BLD: 1 /100 WBCS — HIGH (ref 0–0)
NRBC # BLD: 2 /100 — HIGH (ref 0–0)
NT-PROBNP SERPL-SCNC: 376 PG/ML — HIGH (ref 0–300)
NT-PROBNP SERPL-SCNC: 630 PG/ML — HIGH (ref 0–300)
ORGANISM # SPEC MICROSCOPIC CNT: SIGNIFICANT CHANGE UP
PCO2 BLDV: 34 MMHG — LOW (ref 42–55)
PCO2 BLDV: 43 MMHG — SIGNIFICANT CHANGE UP (ref 42–55)
PH BLDV: 7.38 — SIGNIFICANT CHANGE UP (ref 7.32–7.43)
PH BLDV: 7.42 — SIGNIFICANT CHANGE UP (ref 7.32–7.43)
PH UR: 5 — SIGNIFICANT CHANGE UP (ref 5–8)
PH UR: 5 — SIGNIFICANT CHANGE UP (ref 5–8)
PHOSPHATE SERPL-MCNC: 0.7 MG/DL — CRITICAL LOW (ref 2.4–4.7)
PHOSPHATE SERPL-MCNC: 0.7 MG/DL — CRITICAL LOW (ref 2.4–4.7)
PHOSPHATE SERPL-MCNC: 0.9 MG/DL
PHOSPHATE SERPL-MCNC: 0.9 MG/DL — CRITICAL LOW (ref 2.4–4.7)
PHOSPHATE SERPL-MCNC: 1.1 MG/DL — LOW (ref 2.4–4.7)
PLAT MORPH BLD: NORMAL — SIGNIFICANT CHANGE UP
PLATELET # BLD AUTO: 255 K/UL — SIGNIFICANT CHANGE UP (ref 150–400)
PLATELET # BLD AUTO: 285 K/UL — SIGNIFICANT CHANGE UP (ref 150–400)
PLATELET # BLD AUTO: 287 K/UL — SIGNIFICANT CHANGE UP (ref 150–400)
PLATELET # BLD AUTO: 293 K/UL — SIGNIFICANT CHANGE UP (ref 150–400)
PLATELET # BLD AUTO: 308 K/UL — SIGNIFICANT CHANGE UP (ref 150–400)
PLATELET # BLD AUTO: 354 K/UL — SIGNIFICANT CHANGE UP (ref 150–400)
PLATELET # BLD AUTO: 369 K/UL — SIGNIFICANT CHANGE UP (ref 150–400)
PLATELET # BLD AUTO: 369 K/UL — SIGNIFICANT CHANGE UP (ref 150–400)
PLATELET # BLD AUTO: 379 K/UL — SIGNIFICANT CHANGE UP (ref 150–400)
PLATELET # BLD AUTO: 386 K/UL — SIGNIFICANT CHANGE UP (ref 150–400)
PLATELET # BLD AUTO: 389 K/UL — SIGNIFICANT CHANGE UP (ref 150–400)
PLATELET # BLD AUTO: 392 K/UL — SIGNIFICANT CHANGE UP (ref 150–400)
PLATELET # BLD AUTO: 395 K/UL — SIGNIFICANT CHANGE UP (ref 150–400)
PLATELET # BLD AUTO: 407 K/UL — HIGH (ref 150–400)
PLATELET # BLD AUTO: 510 K/UL — HIGH (ref 150–400)
PLATELET # BLD AUTO: 520 K/UL — HIGH (ref 150–400)
PLATELET # BLD AUTO: 544 K/UL — HIGH (ref 150–400)
PO2 BLDV: 63 MMHG — HIGH (ref 25–45)
PO2 BLDV: 74 MMHG — HIGH (ref 25–45)
POLYCHROMASIA BLD QL SMEAR: SIGNIFICANT CHANGE UP
POTASSIUM BLDV-SCNC: 3.3 MMOL/L — LOW (ref 3.5–5.1)
POTASSIUM SERPL-MCNC: 3.1 MMOL/L — LOW (ref 3.5–5.3)
POTASSIUM SERPL-MCNC: 3.1 MMOL/L — LOW (ref 3.5–5.3)
POTASSIUM SERPL-MCNC: 3.2 MMOL/L — LOW (ref 3.5–5.3)
POTASSIUM SERPL-MCNC: 3.3 MMOL/L — LOW (ref 3.5–5.3)
POTASSIUM SERPL-MCNC: 3.6 MMOL/L — SIGNIFICANT CHANGE UP (ref 3.5–5.3)
POTASSIUM SERPL-MCNC: 3.7 MMOL/L — SIGNIFICANT CHANGE UP (ref 3.5–5.3)
POTASSIUM SERPL-MCNC: 3.8 MMOL/L — SIGNIFICANT CHANGE UP (ref 3.5–5.3)
POTASSIUM SERPL-SCNC: 3.1 MMOL/L — LOW (ref 3.5–5.3)
POTASSIUM SERPL-SCNC: 3.1 MMOL/L — LOW (ref 3.5–5.3)
POTASSIUM SERPL-SCNC: 3.2 MMOL/L — LOW (ref 3.5–5.3)
POTASSIUM SERPL-SCNC: 3.3 MMOL/L — LOW (ref 3.5–5.3)
POTASSIUM SERPL-SCNC: 3.6 MMOL/L
POTASSIUM SERPL-SCNC: 3.6 MMOL/L
POTASSIUM SERPL-SCNC: 3.6 MMOL/L — SIGNIFICANT CHANGE UP (ref 3.5–5.3)
POTASSIUM SERPL-SCNC: 3.7 MMOL/L — SIGNIFICANT CHANGE UP (ref 3.5–5.3)
POTASSIUM SERPL-SCNC: 3.8 MMOL/L — SIGNIFICANT CHANGE UP (ref 3.5–5.3)
POTASSIUM SERPL-SCNC: 4 MMOL/L
POTASSIUM SERPL-SCNC: 4 MMOL/L
POTASSIUM SERPL-SCNC: 4.2 MMOL/L
POTASSIUM SERPL-SCNC: 4.5 MMOL/L
POTASSIUM SERPL-SCNC: 4.6 MMOL/L
PROCALCITONIN SERPL-MCNC: 0.67 NG/ML — HIGH (ref 0.02–0.1)
PROMYELOCYTES # FLD: 0.9 % — HIGH (ref 0–0)
PROT SERPL-MCNC: 6.5 G/DL — LOW (ref 6.6–8.7)
PROT SERPL-MCNC: 6.8 G/DL
PROT SERPL-MCNC: 6.8 G/DL — SIGNIFICANT CHANGE UP (ref 6.6–8.7)
PROT SERPL-MCNC: 6.8 G/DL — SIGNIFICANT CHANGE UP (ref 6.6–8.7)
PROT SERPL-MCNC: 6.9 G/DL
PROT SERPL-MCNC: 7 G/DL
PROT SERPL-MCNC: 7.1 G/DL
PROT SERPL-MCNC: 7.3 G/DL
PROT UR-MCNC: 30 MG/DL
PROT UR-MCNC: 30 MG/DL
PROTHROM AB SERPL-ACNC: 14.7 SEC — HIGH (ref 10.5–13.4)
PROTHROM AB SERPL-ACNC: 15.2 SEC — HIGH (ref 10.5–13.4)
PROTHROM AB SERPL-ACNC: 15.4 SEC — HIGH (ref 10.5–13.4)
PSA SERPL-MCNC: 12.5 NG/ML
PSA SERPL-MCNC: 13.8 NG/ML
PSA SERPL-MCNC: 14.4 NG/ML
PSA SERPL-MCNC: 15.1 NG/ML
PSA SERPL-MCNC: 24.4 NG/ML
PSA SERPL-MCNC: 58 NG/ML
RAPID RVP RESULT: SIGNIFICANT CHANGE UP
RAPID RVP RESULT: SIGNIFICANT CHANGE UP
RBC # BLD: 3.56 M/UL — LOW (ref 4.2–5.8)
RBC # BLD: 3.6 M/UL — LOW (ref 4.2–5.8)
RBC # BLD: 3.61 M/UL — LOW (ref 4.2–5.8)
RBC # BLD: 3.61 M/UL — LOW (ref 4.2–5.8)
RBC # BLD: 3.63 M/UL — LOW (ref 4.2–5.8)
RBC # BLD: 3.72 M/UL — LOW (ref 4.2–5.8)
RBC # BLD: 3.75 M/UL — LOW (ref 4.2–5.8)
RBC # BLD: 3.77 M/UL — LOW (ref 4.2–5.8)
RBC # BLD: 3.78 M/UL — LOW (ref 4.2–5.8)
RBC # BLD: 3.83 M/UL — LOW (ref 4.2–5.8)
RBC # BLD: 4.12 M/UL — LOW (ref 4.2–5.8)
RBC # BLD: 4.26 M/UL — SIGNIFICANT CHANGE UP (ref 4.2–5.8)
RBC # BLD: 4.3 M/UL — SIGNIFICANT CHANGE UP (ref 4.2–5.8)
RBC # BLD: 4.41 M/UL — SIGNIFICANT CHANGE UP (ref 4.2–5.8)
RBC # BLD: 4.74 M/UL — SIGNIFICANT CHANGE UP (ref 4.2–5.8)
RBC # BLD: 4.79 M/UL — SIGNIFICANT CHANGE UP (ref 4.2–5.8)
RBC # BLD: 4.95 M/UL — SIGNIFICANT CHANGE UP (ref 4.2–5.8)
RBC # FLD: 11.6 % — SIGNIFICANT CHANGE UP (ref 10.3–14.5)
RBC # FLD: 11.9 % — SIGNIFICANT CHANGE UP (ref 10.3–14.5)
RBC # FLD: 12.1 % — SIGNIFICANT CHANGE UP (ref 10.3–14.5)
RBC # FLD: 12.5 % — SIGNIFICANT CHANGE UP (ref 10.3–14.5)
RBC # FLD: 12.5 % — SIGNIFICANT CHANGE UP (ref 10.3–14.5)
RBC # FLD: 12.6 % — SIGNIFICANT CHANGE UP (ref 10.3–14.5)
RBC # FLD: 12.7 % — SIGNIFICANT CHANGE UP (ref 10.3–14.5)
RBC # FLD: 12.9 % — SIGNIFICANT CHANGE UP (ref 10.3–14.5)
RBC # FLD: 12.9 % — SIGNIFICANT CHANGE UP (ref 10.3–14.5)
RBC # FLD: 13 % — SIGNIFICANT CHANGE UP (ref 10.3–14.5)
RBC # FLD: 13.1 % — SIGNIFICANT CHANGE UP (ref 10.3–14.5)
RBC # FLD: 13.6 % — SIGNIFICANT CHANGE UP (ref 10.3–14.5)
RBC # FLD: 13.8 % — SIGNIFICANT CHANGE UP (ref 10.3–14.5)
RBC # FLD: 13.8 % — SIGNIFICANT CHANGE UP (ref 10.3–14.5)
RBC BLD AUTO: ABNORMAL
RBC CASTS # UR COMP ASSIST: ABNORMAL /HPF (ref 0–4)
RBC CASTS # UR COMP ASSIST: SIGNIFICANT CHANGE UP /HPF (ref 0–4)
SAO2 % BLDV: 93.8 % — SIGNIFICANT CHANGE UP
SAO2 % BLDV: 97.3 % — SIGNIFICANT CHANGE UP
SARS-COV-2 RNA SPEC QL NAA+PROBE: SIGNIFICANT CHANGE UP
SARS-COV-2 RNA SPEC QL NAA+PROBE: SIGNIFICANT CHANGE UP
SODIUM SERPL-SCNC: 134 MMOL/L — LOW (ref 135–145)
SODIUM SERPL-SCNC: 135 MMOL/L — SIGNIFICANT CHANGE UP (ref 135–145)
SODIUM SERPL-SCNC: 135 MMOL/L — SIGNIFICANT CHANGE UP (ref 135–145)
SODIUM SERPL-SCNC: 136 MMOL/L — SIGNIFICANT CHANGE UP (ref 135–145)
SODIUM SERPL-SCNC: 136 MMOL/L — SIGNIFICANT CHANGE UP (ref 135–145)
SODIUM SERPL-SCNC: 138 MMOL/L — SIGNIFICANT CHANGE UP (ref 135–145)
SODIUM SERPL-SCNC: 138 MMOL/L — SIGNIFICANT CHANGE UP (ref 135–145)
SODIUM SERPL-SCNC: 139 MMOL/L
SODIUM SERPL-SCNC: 139 MMOL/L
SODIUM SERPL-SCNC: 140 MMOL/L — SIGNIFICANT CHANGE UP (ref 135–145)
SODIUM SERPL-SCNC: 141 MMOL/L
SODIUM SERPL-SCNC: 142 MMOL/L
SODIUM SERPL-SCNC: 144 MMOL/L
SP GR SPEC: 1.01 — SIGNIFICANT CHANGE UP (ref 1.01–1.02)
SP GR SPEC: 1.02 — SIGNIFICANT CHANGE UP (ref 1.01–1.02)
SPECIMEN SOURCE: SIGNIFICANT CHANGE UP
TESTOST SERPL-MCNC: <2.5 NG/DL
TM INTERPRETATION: SIGNIFICANT CHANGE UP
TROPONIN T SERPL-MCNC: <0.01 NG/ML — SIGNIFICANT CHANGE UP (ref 0–0.06)
TROPONIN T SERPL-MCNC: <0.01 NG/ML — SIGNIFICANT CHANGE UP (ref 0–0.06)
UROBILINOGEN FLD QL: NEGATIVE MG/DL — SIGNIFICANT CHANGE UP
UROBILINOGEN FLD QL: NEGATIVE MG/DL — SIGNIFICANT CHANGE UP
WBC # BLD: 13.14 K/UL — HIGH (ref 3.8–10.5)
WBC # BLD: 14.47 K/UL — HIGH (ref 3.8–10.5)
WBC # BLD: 14.78 K/UL — HIGH (ref 3.8–10.5)
WBC # BLD: 7.3 K/UL — SIGNIFICANT CHANGE UP (ref 3.8–10.5)
WBC # BLD: 7.3 K/UL — SIGNIFICANT CHANGE UP (ref 3.8–10.5)
WBC # BLD: 7.5 K/UL — SIGNIFICANT CHANGE UP (ref 3.8–10.5)
WBC # BLD: 7.5 K/UL — SIGNIFICANT CHANGE UP (ref 3.8–10.5)
WBC # BLD: 7.8 K/UL — SIGNIFICANT CHANGE UP (ref 3.8–10.5)
WBC # BLD: 8.09 K/UL — SIGNIFICANT CHANGE UP (ref 3.8–10.5)
WBC # BLD: 8.1 K/UL — SIGNIFICANT CHANGE UP (ref 3.8–10.5)
WBC # BLD: 8.1 K/UL — SIGNIFICANT CHANGE UP (ref 3.8–10.5)
WBC # BLD: 8.39 K/UL — SIGNIFICANT CHANGE UP (ref 3.8–10.5)
WBC # BLD: 8.5 K/UL — SIGNIFICANT CHANGE UP (ref 3.8–10.5)
WBC # BLD: 9.02 K/UL — SIGNIFICANT CHANGE UP (ref 3.8–10.5)
WBC # BLD: 9.25 K/UL — SIGNIFICANT CHANGE UP (ref 3.8–10.5)
WBC # BLD: 9.42 K/UL — SIGNIFICANT CHANGE UP (ref 3.8–10.5)
WBC # BLD: 9.83 K/UL — SIGNIFICANT CHANGE UP (ref 3.8–10.5)
WBC # FLD AUTO: 13.14 K/UL — HIGH (ref 3.8–10.5)
WBC # FLD AUTO: 14.47 K/UL — HIGH (ref 3.8–10.5)
WBC # FLD AUTO: 14.78 K/UL — HIGH (ref 3.8–10.5)
WBC # FLD AUTO: 7.3 K/UL — SIGNIFICANT CHANGE UP (ref 3.8–10.5)
WBC # FLD AUTO: 7.3 K/UL — SIGNIFICANT CHANGE UP (ref 3.8–10.5)
WBC # FLD AUTO: 7.5 K/UL — SIGNIFICANT CHANGE UP (ref 3.8–10.5)
WBC # FLD AUTO: 7.5 K/UL — SIGNIFICANT CHANGE UP (ref 3.8–10.5)
WBC # FLD AUTO: 7.8 K/UL — SIGNIFICANT CHANGE UP (ref 3.8–10.5)
WBC # FLD AUTO: 8.09 K/UL — SIGNIFICANT CHANGE UP (ref 3.8–10.5)
WBC # FLD AUTO: 8.1 K/UL — SIGNIFICANT CHANGE UP (ref 3.8–10.5)
WBC # FLD AUTO: 8.1 K/UL — SIGNIFICANT CHANGE UP (ref 3.8–10.5)
WBC # FLD AUTO: 8.39 K/UL — SIGNIFICANT CHANGE UP (ref 3.8–10.5)
WBC # FLD AUTO: 8.5 K/UL — SIGNIFICANT CHANGE UP (ref 3.8–10.5)
WBC # FLD AUTO: 9.02 K/UL — SIGNIFICANT CHANGE UP (ref 3.8–10.5)
WBC # FLD AUTO: 9.25 K/UL — SIGNIFICANT CHANGE UP (ref 3.8–10.5)
WBC # FLD AUTO: 9.42 K/UL — SIGNIFICANT CHANGE UP (ref 3.8–10.5)
WBC # FLD AUTO: 9.83 K/UL — SIGNIFICANT CHANGE UP (ref 3.8–10.5)
WBC UR QL: SIGNIFICANT CHANGE UP /HPF (ref 0–5)
WBC UR QL: SIGNIFICANT CHANGE UP /HPF (ref 0–5)

## 2022-01-01 PROCEDURE — 88342 IMHCHEM/IMCYTCHM 1ST ANTB: CPT | Mod: 26,59

## 2022-01-01 PROCEDURE — 93010 ELECTROCARDIOGRAM REPORT: CPT

## 2022-01-01 PROCEDURE — 99285 EMERGENCY DEPT VISIT HI MDM: CPT | Mod: CS,GC

## 2022-01-01 PROCEDURE — 71045 X-RAY EXAM CHEST 1 VIEW: CPT

## 2022-01-01 PROCEDURE — 84295 ASSAY OF SERUM SODIUM: CPT

## 2022-01-01 PROCEDURE — 99214 OFFICE O/P EST MOD 30 MIN: CPT

## 2022-01-01 PROCEDURE — 87077 CULTURE AEROBIC IDENTIFY: CPT

## 2022-01-01 PROCEDURE — 99223 1ST HOSP IP/OBS HIGH 75: CPT

## 2022-01-01 PROCEDURE — 99213 OFFICE O/P EST LOW 20 MIN: CPT

## 2022-01-01 PROCEDURE — 36415 COLL VENOUS BLD VENIPUNCTURE: CPT

## 2022-01-01 PROCEDURE — 86900 BLOOD TYPING SEROLOGIC ABO: CPT

## 2022-01-01 PROCEDURE — 80053 COMPREHEN METABOLIC PANEL: CPT

## 2022-01-01 PROCEDURE — 83735 ASSAY OF MAGNESIUM: CPT

## 2022-01-01 PROCEDURE — 99233 SBSQ HOSP IP/OBS HIGH 50: CPT

## 2022-01-01 PROCEDURE — G1004: CPT

## 2022-01-01 PROCEDURE — 73030 X-RAY EXAM OF SHOULDER: CPT | Mod: 26,RT

## 2022-01-01 PROCEDURE — 93970 EXTREMITY STUDY: CPT | Mod: 26

## 2022-01-01 PROCEDURE — 71275 CT ANGIOGRAPHY CHEST: CPT | Mod: 26,ME

## 2022-01-01 PROCEDURE — 99495 TRANSJ CARE MGMT MOD F2F 14D: CPT

## 2022-01-01 PROCEDURE — 84145 PROCALCITONIN (PCT): CPT

## 2022-01-01 PROCEDURE — 71260 CT THORAX DX C+: CPT

## 2022-01-01 PROCEDURE — 71046 X-RAY EXAM CHEST 2 VIEWS: CPT

## 2022-01-01 PROCEDURE — 99239 HOSP IP/OBS DSCHRG MGMT >30: CPT

## 2022-01-01 PROCEDURE — 87086 URINE CULTURE/COLONY COUNT: CPT

## 2022-01-01 PROCEDURE — 80048 BASIC METABOLIC PNL TOTAL CA: CPT

## 2022-01-01 PROCEDURE — 82947 ASSAY GLUCOSE BLOOD QUANT: CPT

## 2022-01-01 PROCEDURE — 20206 BIOPSY MUSCLE PERQ NEEDLE: CPT | Mod: RT

## 2022-01-01 PROCEDURE — 93005 ELECTROCARDIOGRAM TRACING: CPT

## 2022-01-01 PROCEDURE — 84484 ASSAY OF TROPONIN QUANT: CPT

## 2022-01-01 PROCEDURE — 12345: CPT | Mod: NC

## 2022-01-01 PROCEDURE — 87040 BLOOD CULTURE FOR BACTERIA: CPT

## 2022-01-01 PROCEDURE — 73502 X-RAY EXAM HIP UNI 2-3 VIEWS: CPT | Mod: 26,RT

## 2022-01-01 PROCEDURE — 85025 COMPLETE CBC W/AUTO DIFF WBC: CPT

## 2022-01-01 PROCEDURE — 0225U NFCT DS DNA&RNA 21 SARSCOV2: CPT

## 2022-01-01 PROCEDURE — 99221 1ST HOSP IP/OBS SF/LOW 40: CPT

## 2022-01-01 PROCEDURE — 88305 TISSUE EXAM BY PATHOLOGIST: CPT

## 2022-01-01 PROCEDURE — 81001 URINALYSIS AUTO W/SCOPE: CPT

## 2022-01-01 PROCEDURE — 96361 HYDRATE IV INFUSION ADD-ON: CPT

## 2022-01-01 PROCEDURE — 74177 CT ABD & PELVIS W/CONTRAST: CPT | Mod: 26,MH

## 2022-01-01 PROCEDURE — 85730 THROMBOPLASTIN TIME PARTIAL: CPT

## 2022-01-01 PROCEDURE — 82803 BLOOD GASES ANY COMBINATION: CPT

## 2022-01-01 PROCEDURE — 82435 ASSAY OF BLOOD CHLORIDE: CPT

## 2022-01-01 PROCEDURE — 99232 SBSQ HOSP IP/OBS MODERATE 35: CPT

## 2022-01-01 PROCEDURE — 71260 CT THORAX DX C+: CPT | Mod: 26,MH

## 2022-01-01 PROCEDURE — 82330 ASSAY OF CALCIUM: CPT

## 2022-01-01 PROCEDURE — 84132 ASSAY OF SERUM POTASSIUM: CPT

## 2022-01-01 PROCEDURE — 96366 THER/PROPH/DIAG IV INF ADDON: CPT

## 2022-01-01 PROCEDURE — 86901 BLOOD TYPING SEROLOGIC RH(D): CPT

## 2022-01-01 PROCEDURE — 96365 THER/PROPH/DIAG IV INF INIT: CPT

## 2022-01-01 PROCEDURE — 83605 ASSAY OF LACTIC ACID: CPT

## 2022-01-01 PROCEDURE — 83880 ASSAY OF NATRIURETIC PEPTIDE: CPT

## 2022-01-01 PROCEDURE — 99285 EMERGENCY DEPT VISIT HI MDM: CPT

## 2022-01-01 PROCEDURE — 88185 FLOWCYTOMETRY/TC ADD-ON: CPT

## 2022-01-01 PROCEDURE — 87205 SMEAR GRAM STAIN: CPT

## 2022-01-01 PROCEDURE — 71275 CT ANGIOGRAPHY CHEST: CPT | Mod: ME

## 2022-01-01 PROCEDURE — 99203 OFFICE O/P NEW LOW 30 MIN: CPT

## 2022-01-01 PROCEDURE — 85610 PROTHROMBIN TIME: CPT

## 2022-01-01 PROCEDURE — 73030 X-RAY EXAM OF SHOULDER: CPT

## 2022-01-01 PROCEDURE — 85027 COMPLETE CBC AUTOMATED: CPT

## 2022-01-01 PROCEDURE — 93306 TTE W/DOPPLER COMPLETE: CPT | Mod: 26

## 2022-01-01 PROCEDURE — 74177 CT ABD & PELVIS W/CONTRAST: CPT

## 2022-01-01 PROCEDURE — 88342 IMHCHEM/IMCYTCHM 1ST ANTB: CPT

## 2022-01-01 PROCEDURE — 96375 TX/PRO/DX INJ NEW DRUG ADDON: CPT

## 2022-01-01 PROCEDURE — 85379 FIBRIN DEGRADATION QUANT: CPT

## 2022-01-01 PROCEDURE — 93970 EXTREMITY STUDY: CPT

## 2022-01-01 PROCEDURE — 87186 SC STD MICRODIL/AGAR DIL: CPT

## 2022-01-01 PROCEDURE — 88189 FLOWCYTOMETRY/READ 16 & >: CPT

## 2022-01-01 PROCEDURE — 85014 HEMATOCRIT: CPT

## 2022-01-01 PROCEDURE — 85018 HEMOGLOBIN: CPT

## 2022-01-01 PROCEDURE — 88341 IMHCHEM/IMCYTCHM EA ADD ANTB: CPT | Mod: 26

## 2022-01-01 PROCEDURE — 71275 CT ANGIOGRAPHY CHEST: CPT | Mod: MA

## 2022-01-01 PROCEDURE — 88305 TISSUE EXAM BY PATHOLOGIST: CPT | Mod: 26

## 2022-01-01 PROCEDURE — 83690 ASSAY OF LIPASE: CPT

## 2022-01-01 PROCEDURE — 71045 X-RAY EXAM CHEST 1 VIEW: CPT | Mod: 26

## 2022-01-01 PROCEDURE — 71275 CT ANGIOGRAPHY CHEST: CPT | Mod: 26,MA

## 2022-01-01 PROCEDURE — 72148 MRI LUMBAR SPINE W/O DYE: CPT | Mod: ME

## 2022-01-01 PROCEDURE — 88341 IMHCHEM/IMCYTCHM EA ADD ANTB: CPT

## 2022-01-01 PROCEDURE — 84100 ASSAY OF PHOSPHORUS: CPT

## 2022-01-01 PROCEDURE — 86850 RBC ANTIBODY SCREEN: CPT

## 2022-01-01 PROCEDURE — 76942 ECHO GUIDE FOR BIOPSY: CPT

## 2022-01-01 PROCEDURE — 93306 TTE W/DOPPLER COMPLETE: CPT

## 2022-01-01 PROCEDURE — 73502 X-RAY EXAM HIP UNI 2-3 VIEWS: CPT

## 2022-01-01 PROCEDURE — 74177 CT ABD & PELVIS W/CONTRAST: CPT | Mod: MH

## 2022-01-01 PROCEDURE — 76942 ECHO GUIDE FOR BIOPSY: CPT | Mod: 26

## 2022-01-01 PROCEDURE — 71046 X-RAY EXAM CHEST 2 VIEWS: CPT | Mod: 26

## 2022-01-01 PROCEDURE — 96374 THER/PROPH/DIAG INJ IV PUSH: CPT

## 2022-01-01 PROCEDURE — 72148 MRI LUMBAR SPINE W/O DYE: CPT | Mod: 26,ME

## 2022-01-01 RX ORDER — ACETAMINOPHEN 500 MG
650 TABLET ORAL EVERY 8 HOURS
Refills: 0 | Status: DISCONTINUED | OUTPATIENT
Start: 2022-01-01 | End: 2022-01-01

## 2022-01-01 RX ORDER — INFLUENZA VIRUS VACCINE 15; 15; 15; 15 UG/.5ML; UG/.5ML; UG/.5ML; UG/.5ML
0.7 SUSPENSION INTRAMUSCULAR ONCE
Refills: 0 | Status: COMPLETED | OUTPATIENT
Start: 2022-01-01 | End: 2022-01-01

## 2022-01-01 RX ORDER — OXYCODONE HYDROCHLORIDE 5 MG/1
10 TABLET ORAL EVERY 4 HOURS
Refills: 0 | Status: DISCONTINUED | OUTPATIENT
Start: 2022-01-01 | End: 2022-01-01

## 2022-01-01 RX ORDER — METHYLNALTREXONE BROMIDE 12 MG/.6ML
12 INJECTION, SOLUTION SUBCUTANEOUS EVERY OTHER DAY
Refills: 0 | Status: DISCONTINUED | OUTPATIENT
Start: 2022-01-01 | End: 2022-01-01

## 2022-01-01 RX ORDER — OXYCODONE HYDROCHLORIDE 5 MG/1
1 TABLET ORAL
Qty: 4 | Refills: 0
Start: 2022-01-01 | End: 2022-01-01

## 2022-01-01 RX ORDER — SODIUM CHLORIDE 9 MG/ML
500 INJECTION INTRAMUSCULAR; INTRAVENOUS; SUBCUTANEOUS ONCE
Refills: 0 | Status: COMPLETED | OUTPATIENT
Start: 2022-01-01 | End: 2022-01-01

## 2022-01-01 RX ORDER — FINASTERIDE 5 MG/1
5 TABLET, FILM COATED ORAL DAILY
Qty: 90 | Refills: 3 | Status: ACTIVE | COMMUNITY
Start: 2020-11-11 | End: 1900-01-01

## 2022-01-01 RX ORDER — ONDANSETRON 8 MG/1
4 TABLET, FILM COATED ORAL EVERY 8 HOURS
Refills: 0 | Status: DISCONTINUED | OUTPATIENT
Start: 2022-01-01 | End: 2022-01-01

## 2022-01-01 RX ORDER — AZITHROMYCIN 500 MG/1
500 TABLET, FILM COATED ORAL EVERY 24 HOURS
Refills: 0 | Status: DISCONTINUED | OUTPATIENT
Start: 2022-01-01 | End: 2022-01-01

## 2022-01-01 RX ORDER — POTASSIUM CHLORIDE 20 MEQ
10 PACKET (EA) ORAL
Refills: 0 | Status: COMPLETED | OUTPATIENT
Start: 2022-01-01 | End: 2022-01-01

## 2022-01-01 RX ORDER — FINASTERIDE 5 MG/1
5 TABLET, FILM COATED ORAL DAILY
Refills: 0 | Status: DISCONTINUED | OUTPATIENT
Start: 2022-01-01 | End: 2022-01-01

## 2022-01-01 RX ORDER — ABIRATERONE ACETATE 250 MG/1
4 TABLET ORAL
Qty: 0 | Refills: 0 | DISCHARGE

## 2022-01-01 RX ORDER — LANOLIN ALCOHOL/MO/W.PET/CERES
3 CREAM (GRAM) TOPICAL AT BEDTIME
Refills: 0 | Status: DISCONTINUED | OUTPATIENT
Start: 2022-01-01 | End: 2022-01-01

## 2022-01-01 RX ORDER — ENEMA 19; 7 G/133ML; G/133ML
7-19 ENEMA RECTAL
Qty: 1 | Refills: 0 | Status: DISCONTINUED | COMMUNITY
Start: 2021-08-10 | End: 2022-01-01

## 2022-01-01 RX ORDER — TADALAFIL 20 MG/1
20 TABLET ORAL
Qty: 6 | Refills: 3 | Status: DISCONTINUED | OUTPATIENT
Start: 2021-06-03 | End: 2022-01-01

## 2022-01-01 RX ORDER — OXYCODONE HYDROCHLORIDE 5 MG/1
1 TABLET ORAL
Qty: 12 | Refills: 0
Start: 2022-01-01 | End: 2022-01-01

## 2022-01-01 RX ORDER — VANCOMYCIN HCL 1 G
1000 VIAL (EA) INTRAVENOUS ONCE
Refills: 0 | Status: COMPLETED | OUTPATIENT
Start: 2022-01-01 | End: 2022-01-01

## 2022-01-01 RX ORDER — AZITHROMYCIN 500 MG/1
TABLET, FILM COATED ORAL
Refills: 0 | Status: DISCONTINUED | OUTPATIENT
Start: 2022-01-01 | End: 2022-01-01

## 2022-01-01 RX ORDER — SODIUM,POTASSIUM PHOSPHATES 278-250MG
1 POWDER IN PACKET (EA) ORAL
Refills: 0 | Status: DISCONTINUED | OUTPATIENT
Start: 2022-01-01 | End: 2022-01-01

## 2022-01-01 RX ORDER — HYDROMORPHONE HYDROCHLORIDE 2 MG/ML
4 INJECTION INTRAMUSCULAR; INTRAVENOUS; SUBCUTANEOUS
Refills: 0 | Status: DISCONTINUED | OUTPATIENT
Start: 2022-01-01 | End: 2022-01-01

## 2022-01-01 RX ORDER — ENOXAPARIN SODIUM 100 MG/ML
40 INJECTION SUBCUTANEOUS EVERY 24 HOURS
Refills: 0 | Status: DISCONTINUED | OUTPATIENT
Start: 2022-01-01 | End: 2022-01-01

## 2022-01-01 RX ORDER — POTASSIUM CHLORIDE 20 MEQ
40 PACKET (EA) ORAL EVERY 4 HOURS
Refills: 0 | Status: COMPLETED | OUTPATIENT
Start: 2022-01-01 | End: 2022-01-01

## 2022-01-01 RX ORDER — PANTOPRAZOLE SODIUM 20 MG/1
40 TABLET, DELAYED RELEASE ORAL
Refills: 0 | Status: DISCONTINUED | OUTPATIENT
Start: 2022-01-01 | End: 2022-01-01

## 2022-01-01 RX ORDER — FINASTERIDE 5 MG/1
1 TABLET, FILM COATED ORAL
Qty: 0 | Refills: 0 | DISCHARGE

## 2022-01-01 RX ORDER — METHYLPREDNISOLONE 4 MG/1
4 TABLET ORAL
Qty: 1 | Refills: 0 | Status: DISCONTINUED | COMMUNITY
Start: 2022-01-01 | End: 2022-01-01

## 2022-01-01 RX ORDER — ALBUTEROL 90 UG/1
2 AEROSOL, METERED ORAL
Qty: 0 | Refills: 0 | DISCHARGE
Start: 2022-01-01

## 2022-01-01 RX ORDER — AZITHROMYCIN 500 MG/1
500 TABLET, FILM COATED ORAL ONCE
Refills: 0 | Status: COMPLETED | OUTPATIENT
Start: 2022-01-01 | End: 2022-01-01

## 2022-01-01 RX ORDER — CYCLOBENZAPRINE HYDROCHLORIDE 10 MG/1
10 TABLET, FILM COATED ORAL
Qty: 15 | Refills: 0 | Status: DISCONTINUED | COMMUNITY
Start: 2021-09-01 | End: 2022-01-01

## 2022-01-01 RX ORDER — CEFUROXIME AXETIL 500 MG/1
500 TABLET ORAL
Qty: 6 | Refills: 0 | Status: DISCONTINUED | COMMUNITY
Start: 2021-08-12 | End: 2022-01-01

## 2022-01-01 RX ORDER — SODIUM,POTASSIUM PHOSPHATES 278-250MG
1 POWDER IN PACKET (EA) ORAL
Refills: 0 | Status: COMPLETED | OUTPATIENT
Start: 2022-01-01 | End: 2022-01-01

## 2022-01-01 RX ORDER — OXYCODONE HYDROCHLORIDE 5 MG/1
10 TABLET ORAL ONCE
Refills: 0 | Status: DISCONTINUED | OUTPATIENT
Start: 2022-01-01 | End: 2022-01-01

## 2022-01-01 RX ORDER — PREDNISONE 5 MG/1
5 TABLET ORAL
Qty: 90 | Refills: 3 | Status: ACTIVE | COMMUNITY
Start: 2021-09-29 | End: 1900-01-01

## 2022-01-01 RX ORDER — SODIUM,POTASSIUM PHOSPHATES 278-250MG
1 POWDER IN PACKET (EA) ORAL
Qty: 4 | Refills: 0
Start: 2022-01-01 | End: 2022-01-01

## 2022-01-01 RX ORDER — ENOXAPARIN SODIUM 100 MG/ML
40 INJECTION SUBCUTANEOUS ONCE
Refills: 0 | Status: COMPLETED | OUTPATIENT
Start: 2022-01-01 | End: 2022-01-01

## 2022-01-01 RX ORDER — ALBUTEROL 90 UG/1
2 AEROSOL, METERED ORAL EVERY 6 HOURS
Refills: 0 | Status: DISCONTINUED | OUTPATIENT
Start: 2022-01-01 | End: 2022-01-01

## 2022-01-01 RX ORDER — KETOROLAC TROMETHAMINE 30 MG/ML
15 SYRINGE (ML) INJECTION EVERY 6 HOURS
Refills: 0 | Status: DISCONTINUED | OUTPATIENT
Start: 2022-01-01 | End: 2022-01-01

## 2022-01-01 RX ORDER — OXYCODONE HYDROCHLORIDE 5 MG/1
15 TABLET ORAL EVERY 12 HOURS
Refills: 0 | Status: DISCONTINUED | OUTPATIENT
Start: 2022-01-01 | End: 2022-01-01

## 2022-01-01 RX ORDER — OXYCODONE 10 MG/1
10 TABLET ORAL
Qty: 90 | Refills: 0 | Status: ACTIVE | COMMUNITY
Start: 2021-06-15 | End: 1900-01-01

## 2022-01-01 RX ORDER — OXYCODONE HYDROCHLORIDE 5 MG/1
20 TABLET ORAL EVERY 12 HOURS
Refills: 0 | Status: DISCONTINUED | OUTPATIENT
Start: 2022-01-01 | End: 2022-01-01

## 2022-01-01 RX ORDER — ABIRATERONE ACETATE 250 MG/1
250 TABLET, FILM COATED ORAL
Qty: 120 | Refills: 5 | Status: ACTIVE | COMMUNITY
Start: 2021-09-29 | End: 1900-01-01

## 2022-01-01 RX ORDER — DIAZEPAM 5 MG/1
5 TABLET ORAL
Qty: 1 | Refills: 0 | Status: DISCONTINUED | COMMUNITY
Start: 2021-08-10 | End: 2022-01-01

## 2022-01-01 RX ORDER — OXYCODONE HYDROCHLORIDE 5 MG/1
1 TABLET ORAL
Qty: 10 | Refills: 0
Start: 2022-01-01 | End: 2022-12-26

## 2022-01-01 RX ORDER — POTASSIUM CHLORIDE 20 MEQ
40 PACKET (EA) ORAL ONCE
Refills: 0 | Status: COMPLETED | OUTPATIENT
Start: 2022-01-01 | End: 2022-01-01

## 2022-01-01 RX ORDER — SENNA PLUS 8.6 MG/1
2 TABLET ORAL AT BEDTIME
Refills: 0 | Status: DISCONTINUED | OUTPATIENT
Start: 2022-01-01 | End: 2022-01-01

## 2022-01-01 RX ORDER — OXYCODONE HYDROCHLORIDE 5 MG/1
15 TABLET ORAL
Refills: 0 | Status: DISCONTINUED | OUTPATIENT
Start: 2022-01-01 | End: 2022-01-01

## 2022-01-01 RX ORDER — HYDROMORPHONE HYDROCHLORIDE 2 MG/ML
1 INJECTION INTRAMUSCULAR; INTRAVENOUS; SUBCUTANEOUS
Qty: 20 | Refills: 0
Start: 2022-01-01

## 2022-01-01 RX ORDER — PIPERACILLIN AND TAZOBACTAM 4; .5 G/20ML; G/20ML
3.38 INJECTION, POWDER, LYOPHILIZED, FOR SOLUTION INTRAVENOUS ONCE
Refills: 0 | Status: COMPLETED | OUTPATIENT
Start: 2022-01-01 | End: 2022-01-01

## 2022-01-01 RX ORDER — POTASSIUM CHLORIDE 20 MEQ
10 PACKET (EA) ORAL ONCE
Refills: 0 | Status: COMPLETED | OUTPATIENT
Start: 2022-01-01 | End: 2022-01-01

## 2022-01-01 RX ORDER — TAMSULOSIN HYDROCHLORIDE 0.4 MG/1
0.4 CAPSULE ORAL AT BEDTIME
Refills: 0 | Status: DISCONTINUED | OUTPATIENT
Start: 2022-01-01 | End: 2022-01-01

## 2022-01-01 RX ORDER — TAMSULOSIN HYDROCHLORIDE 0.4 MG/1
0.8 CAPSULE ORAL AT BEDTIME
Refills: 0 | Status: DISCONTINUED | OUTPATIENT
Start: 2022-01-01 | End: 2022-01-01

## 2022-01-01 RX ORDER — ACETAMINOPHEN 500 MG
650 TABLET ORAL EVERY 6 HOURS
Refills: 0 | Status: DISCONTINUED | OUTPATIENT
Start: 2022-01-01 | End: 2022-01-01

## 2022-01-01 RX ORDER — TAMSULOSIN HYDROCHLORIDE 0.4 MG/1
0.4 CAPSULE ORAL
Qty: 180 | Refills: 2 | Status: ACTIVE | COMMUNITY
Start: 2020-10-22 | End: 1900-01-01

## 2022-01-01 RX ORDER — BICALUTAMIDE 50 MG/1
50 TABLET ORAL
Qty: 30 | Refills: 2 | Status: DISCONTINUED | COMMUNITY
Start: 2021-09-15 | End: 2022-01-01

## 2022-01-01 RX ORDER — HYDROMORPHONE HYDROCHLORIDE 2 MG/ML
0.5 INJECTION INTRAMUSCULAR; INTRAVENOUS; SUBCUTANEOUS ONCE
Refills: 0 | Status: DISCONTINUED | OUTPATIENT
Start: 2022-01-01 | End: 2022-01-01

## 2022-01-01 RX ORDER — POTASSIUM PHOSPHATE, MONOBASIC 500 MG/1
500 TABLET, SOLUBLE ORAL 4 TIMES DAILY
Qty: 56 | Refills: 0 | Status: ACTIVE | COMMUNITY
Start: 2022-01-01 | End: 1900-01-01

## 2022-01-01 RX ORDER — ABIRATERONE ACETATE 250 MG/1
1000 TABLET ORAL DAILY
Refills: 0 | Status: DISCONTINUED | OUTPATIENT
Start: 2022-01-01 | End: 2022-01-01

## 2022-01-01 RX ORDER — OXYCODONE HYDROCHLORIDE 5 MG/1
30 TABLET ORAL EVERY 12 HOURS
Refills: 0 | Status: DISCONTINUED | OUTPATIENT
Start: 2022-01-01 | End: 2022-01-01

## 2022-01-01 RX ORDER — HYDROMORPHONE HYDROCHLORIDE 2 MG/ML
2 INJECTION INTRAMUSCULAR; INTRAVENOUS; SUBCUTANEOUS
Refills: 0 | Status: DISCONTINUED | OUTPATIENT
Start: 2022-01-01 | End: 2022-01-01

## 2022-01-01 RX ORDER — GUAIFENESIN/DEXTROMETHORPHAN 600MG-30MG
10 TABLET, EXTENDED RELEASE 12 HR ORAL EVERY 6 HOURS
Refills: 0 | Status: DISCONTINUED | OUTPATIENT
Start: 2022-01-01 | End: 2022-01-01

## 2022-01-01 RX ORDER — CEFEPIME 1 G/1
2000 INJECTION, POWDER, FOR SOLUTION INTRAMUSCULAR; INTRAVENOUS EVERY 8 HOURS
Refills: 0 | Status: DISCONTINUED | OUTPATIENT
Start: 2022-01-01 | End: 2022-01-01

## 2022-01-01 RX ORDER — SODIUM,POTASSIUM PHOSPHATES 278-250MG
1 POWDER IN PACKET (EA) ORAL EVERY 4 HOURS
Refills: 0 | Status: COMPLETED | OUTPATIENT
Start: 2022-01-01 | End: 2022-01-01

## 2022-01-01 RX ORDER — OXYCODONE HYDROCHLORIDE 5 MG/1
1 TABLET ORAL
Qty: 30 | Refills: 0
Start: 2022-01-01 | End: 2022-01-01

## 2022-01-01 RX ORDER — OXYCODONE HYDROCHLORIDE 5 MG/1
1 TABLET ORAL
Qty: 10 | Refills: 0
Start: 2022-01-01 | End: 2022-01-01

## 2022-01-01 RX ORDER — LIDOCAINE 4 G/100G
2 CREAM TOPICAL DAILY
Refills: 0 | Status: DISCONTINUED | OUTPATIENT
Start: 2022-01-01 | End: 2022-01-01

## 2022-01-01 RX ADMIN — Medication 100 MILLIGRAM(S): at 18:52

## 2022-01-01 RX ADMIN — OXYCODONE HYDROCHLORIDE 10 MILLIGRAM(S): 5 TABLET ORAL at 07:39

## 2022-01-01 RX ADMIN — FINASTERIDE 5 MILLIGRAM(S): 5 TABLET, FILM COATED ORAL at 11:01

## 2022-01-01 RX ADMIN — Medication 1 PACKET(S): at 11:11

## 2022-01-01 RX ADMIN — OXYCODONE HYDROCHLORIDE 10 MILLIGRAM(S): 5 TABLET ORAL at 18:46

## 2022-01-01 RX ADMIN — OXYCODONE HYDROCHLORIDE 10 MILLIGRAM(S): 5 TABLET ORAL at 17:36

## 2022-01-01 RX ADMIN — ENOXAPARIN SODIUM 40 MILLIGRAM(S): 100 INJECTION SUBCUTANEOUS at 18:08

## 2022-01-01 RX ADMIN — OXYCODONE HYDROCHLORIDE 10 MILLIGRAM(S): 5 TABLET ORAL at 23:54

## 2022-01-01 RX ADMIN — Medication 1 PACKET(S): at 08:36

## 2022-01-01 RX ADMIN — OXYCODONE HYDROCHLORIDE 10 MILLIGRAM(S): 5 TABLET ORAL at 17:59

## 2022-01-01 RX ADMIN — SENNA PLUS 2 TABLET(S): 8.6 TABLET ORAL at 21:21

## 2022-01-01 RX ADMIN — SENNA PLUS 2 TABLET(S): 8.6 TABLET ORAL at 23:05

## 2022-01-01 RX ADMIN — Medication 650 MILLIGRAM(S): at 23:41

## 2022-01-01 RX ADMIN — OXYCODONE HYDROCHLORIDE 10 MILLIGRAM(S): 5 TABLET ORAL at 15:07

## 2022-01-01 RX ADMIN — SODIUM CHLORIDE 500 MILLILITER(S): 9 INJECTION INTRAMUSCULAR; INTRAVENOUS; SUBCUTANEOUS at 13:19

## 2022-01-01 RX ADMIN — Medication 5 MILLIGRAM(S): at 06:08

## 2022-01-01 RX ADMIN — PIPERACILLIN AND TAZOBACTAM 200 GRAM(S): 4; .5 INJECTION, POWDER, LYOPHILIZED, FOR SOLUTION INTRAVENOUS at 20:42

## 2022-01-01 RX ADMIN — OXYCODONE HYDROCHLORIDE 15 MILLIGRAM(S): 5 TABLET ORAL at 11:08

## 2022-01-01 RX ADMIN — LIDOCAINE 2 PATCH: 4 CREAM TOPICAL at 22:34

## 2022-01-01 RX ADMIN — OXYCODONE HYDROCHLORIDE 10 MILLIGRAM(S): 5 TABLET ORAL at 05:13

## 2022-01-01 RX ADMIN — Medication 100 MILLIGRAM(S): at 15:16

## 2022-01-01 RX ADMIN — Medication 10 MILLIEQUIVALENT(S): at 17:36

## 2022-01-01 RX ADMIN — Medication 1 TABLET(S): at 05:23

## 2022-01-01 RX ADMIN — Medication 5 MILLIGRAM(S): at 10:36

## 2022-01-01 RX ADMIN — OXYCODONE HYDROCHLORIDE 20 MILLIGRAM(S): 5 TABLET ORAL at 06:58

## 2022-01-01 RX ADMIN — OXYCODONE HYDROCHLORIDE 15 MILLIGRAM(S): 5 TABLET ORAL at 22:45

## 2022-01-01 RX ADMIN — Medication 650 MILLIGRAM(S): at 22:31

## 2022-01-01 RX ADMIN — OXYCODONE HYDROCHLORIDE 10 MILLIGRAM(S): 5 TABLET ORAL at 22:24

## 2022-01-01 RX ADMIN — Medication 5 MILLIGRAM(S): at 09:46

## 2022-01-01 RX ADMIN — Medication 650 MILLIGRAM(S): at 18:08

## 2022-01-01 RX ADMIN — ABIRATERONE ACETATE 1000 MILLIGRAM(S): 250 TABLET ORAL at 05:33

## 2022-01-01 RX ADMIN — Medication 200 MILLIGRAM(S): at 15:18

## 2022-01-01 RX ADMIN — Medication 100 MILLIEQUIVALENT(S): at 15:02

## 2022-01-01 RX ADMIN — Medication 650 MILLIGRAM(S): at 17:19

## 2022-01-01 RX ADMIN — OXYCODONE HYDROCHLORIDE 10 MILLIGRAM(S): 5 TABLET ORAL at 06:20

## 2022-01-01 RX ADMIN — Medication 10 MILLILITER(S): at 06:17

## 2022-01-01 RX ADMIN — Medication 650 MILLIGRAM(S): at 19:00

## 2022-01-01 RX ADMIN — FINASTERIDE 5 MILLIGRAM(S): 5 TABLET, FILM COATED ORAL at 22:40

## 2022-01-01 RX ADMIN — LIDOCAINE 2 PATCH: 4 CREAM TOPICAL at 12:32

## 2022-01-01 RX ADMIN — Medication 650 MILLIGRAM(S): at 02:08

## 2022-01-01 RX ADMIN — OXYCODONE HYDROCHLORIDE 10 MILLIGRAM(S): 5 TABLET ORAL at 08:46

## 2022-01-01 RX ADMIN — Medication 650 MILLIGRAM(S): at 05:33

## 2022-01-01 RX ADMIN — Medication 200 MILLIGRAM(S): at 17:05

## 2022-01-01 RX ADMIN — Medication 10 MILLILITER(S): at 04:04

## 2022-01-01 RX ADMIN — ABIRATERONE ACETATE 1000 MILLIGRAM(S): 250 TABLET ORAL at 06:29

## 2022-01-01 RX ADMIN — OXYCODONE HYDROCHLORIDE 10 MILLIGRAM(S): 5 TABLET ORAL at 13:42

## 2022-01-01 RX ADMIN — OXYCODONE HYDROCHLORIDE 30 MILLIGRAM(S): 5 TABLET ORAL at 17:46

## 2022-01-01 RX ADMIN — Medication 1 TABLET(S): at 17:56

## 2022-01-01 RX ADMIN — HYDROMORPHONE HYDROCHLORIDE 4 MILLIGRAM(S): 2 INJECTION INTRAMUSCULAR; INTRAVENOUS; SUBCUTANEOUS at 17:04

## 2022-01-01 RX ADMIN — Medication 1 PACKET(S): at 09:38

## 2022-01-01 RX ADMIN — OXYCODONE HYDROCHLORIDE 10 MILLIGRAM(S): 5 TABLET ORAL at 15:16

## 2022-01-01 RX ADMIN — OXYCODONE HYDROCHLORIDE 30 MILLIGRAM(S): 5 TABLET ORAL at 05:48

## 2022-01-01 RX ADMIN — TAMSULOSIN HYDROCHLORIDE 0.8 MILLIGRAM(S): 0.4 CAPSULE ORAL at 23:01

## 2022-01-01 RX ADMIN — OXYCODONE HYDROCHLORIDE 10 MILLIGRAM(S): 5 TABLET ORAL at 21:23

## 2022-01-01 RX ADMIN — ABIRATERONE ACETATE 1000 MILLIGRAM(S): 250 TABLET ORAL at 06:05

## 2022-01-01 RX ADMIN — OXYCODONE HYDROCHLORIDE 10 MILLIGRAM(S): 5 TABLET ORAL at 21:51

## 2022-01-01 RX ADMIN — ENOXAPARIN SODIUM 40 MILLIGRAM(S): 100 INJECTION SUBCUTANEOUS at 17:21

## 2022-01-01 RX ADMIN — Medication 650 MILLIGRAM(S): at 05:56

## 2022-01-01 RX ADMIN — Medication 650 MILLIGRAM(S): at 17:03

## 2022-01-01 RX ADMIN — OXYCODONE HYDROCHLORIDE 10 MILLIGRAM(S): 5 TABLET ORAL at 16:16

## 2022-01-01 RX ADMIN — Medication 100 MILLIGRAM(S): at 14:47

## 2022-01-01 RX ADMIN — SODIUM CHLORIDE 500 MILLILITER(S): 9 INJECTION INTRAMUSCULAR; INTRAVENOUS; SUBCUTANEOUS at 20:52

## 2022-01-01 RX ADMIN — TAMSULOSIN HYDROCHLORIDE 0.8 MILLIGRAM(S): 0.4 CAPSULE ORAL at 21:51

## 2022-01-01 RX ADMIN — HYDROMORPHONE HYDROCHLORIDE 0.5 MILLIGRAM(S): 2 INJECTION INTRAMUSCULAR; INTRAVENOUS; SUBCUTANEOUS at 11:48

## 2022-01-01 RX ADMIN — HYDROMORPHONE HYDROCHLORIDE 4 MILLIGRAM(S): 2 INJECTION INTRAMUSCULAR; INTRAVENOUS; SUBCUTANEOUS at 18:13

## 2022-01-01 RX ADMIN — Medication 85 MILLIMOLE(S): at 13:07

## 2022-01-01 RX ADMIN — SODIUM CHLORIDE 500 MILLILITER(S): 9 INJECTION INTRAMUSCULAR; INTRAVENOUS; SUBCUTANEOUS at 11:50

## 2022-01-01 RX ADMIN — Medication 40 MILLIEQUIVALENT(S): at 12:58

## 2022-01-01 RX ADMIN — HYDROMORPHONE HYDROCHLORIDE 2 MILLIGRAM(S): 2 INJECTION INTRAMUSCULAR; INTRAVENOUS; SUBCUTANEOUS at 09:45

## 2022-01-01 RX ADMIN — OXYCODONE HYDROCHLORIDE 15 MILLIGRAM(S): 5 TABLET ORAL at 10:20

## 2022-01-01 RX ADMIN — OXYCODONE HYDROCHLORIDE 10 MILLIGRAM(S): 5 TABLET ORAL at 02:51

## 2022-01-01 RX ADMIN — Medication 100 MILLIGRAM(S): at 21:23

## 2022-01-01 RX ADMIN — OXYCODONE HYDROCHLORIDE 10 MILLIGRAM(S): 5 TABLET ORAL at 08:36

## 2022-01-01 RX ADMIN — OXYCODONE HYDROCHLORIDE 10 MILLIGRAM(S): 5 TABLET ORAL at 04:33

## 2022-01-01 RX ADMIN — OXYCODONE HYDROCHLORIDE 10 MILLIGRAM(S): 5 TABLET ORAL at 17:05

## 2022-01-01 RX ADMIN — OXYCODONE HYDROCHLORIDE 20 MILLIGRAM(S): 5 TABLET ORAL at 06:04

## 2022-01-01 RX ADMIN — Medication 100 MILLIGRAM(S): at 05:56

## 2022-01-01 RX ADMIN — LIDOCAINE 2 PATCH: 4 CREAM TOPICAL at 11:32

## 2022-01-01 RX ADMIN — LIDOCAINE 2 PATCH: 4 CREAM TOPICAL at 11:01

## 2022-01-01 RX ADMIN — Medication 1 PACKET(S): at 10:29

## 2022-01-01 RX ADMIN — Medication 100 MILLIGRAM(S): at 11:17

## 2022-01-01 RX ADMIN — OXYCODONE HYDROCHLORIDE 10 MILLIGRAM(S): 5 TABLET ORAL at 09:30

## 2022-01-01 RX ADMIN — Medication 1 TABLET(S): at 11:00

## 2022-01-01 RX ADMIN — TAMSULOSIN HYDROCHLORIDE 0.8 MILLIGRAM(S): 0.4 CAPSULE ORAL at 23:05

## 2022-01-01 RX ADMIN — ONDANSETRON 4 MILLIGRAM(S): 8 TABLET, FILM COATED ORAL at 05:24

## 2022-01-01 RX ADMIN — OXYCODONE HYDROCHLORIDE 15 MILLIGRAM(S): 5 TABLET ORAL at 10:15

## 2022-01-01 RX ADMIN — OXYCODONE HYDROCHLORIDE 10 MILLIGRAM(S): 5 TABLET ORAL at 23:50

## 2022-01-01 RX ADMIN — Medication 650 MILLIGRAM(S): at 11:01

## 2022-01-01 RX ADMIN — OXYCODONE HYDROCHLORIDE 10 MILLIGRAM(S): 5 TABLET ORAL at 10:21

## 2022-01-01 RX ADMIN — Medication 200 MILLIGRAM(S): at 10:16

## 2022-01-01 RX ADMIN — OXYCODONE HYDROCHLORIDE 10 MILLIGRAM(S): 5 TABLET ORAL at 23:38

## 2022-01-01 RX ADMIN — Medication 10 MILLILITER(S): at 10:05

## 2022-01-01 RX ADMIN — Medication 650 MILLIGRAM(S): at 10:04

## 2022-01-01 RX ADMIN — OXYCODONE HYDROCHLORIDE 10 MILLIGRAM(S): 5 TABLET ORAL at 17:00

## 2022-01-01 RX ADMIN — OXYCODONE HYDROCHLORIDE 10 MILLIGRAM(S): 5 TABLET ORAL at 09:11

## 2022-01-01 RX ADMIN — OXYCODONE HYDROCHLORIDE 10 MILLIGRAM(S): 5 TABLET ORAL at 15:48

## 2022-01-01 RX ADMIN — OXYCODONE HYDROCHLORIDE 10 MILLIGRAM(S): 5 TABLET ORAL at 17:34

## 2022-01-01 RX ADMIN — Medication 100 MILLIEQUIVALENT(S): at 12:46

## 2022-01-01 RX ADMIN — Medication 5 MILLIGRAM(S): at 06:27

## 2022-01-01 RX ADMIN — Medication 650 MILLIGRAM(S): at 11:00

## 2022-01-01 RX ADMIN — Medication 650 MILLIGRAM(S): at 10:29

## 2022-01-01 RX ADMIN — LIDOCAINE 2 PATCH: 4 CREAM TOPICAL at 11:34

## 2022-01-01 RX ADMIN — Medication 100 MILLIGRAM(S): at 11:26

## 2022-01-01 RX ADMIN — Medication 40 MILLIEQUIVALENT(S): at 08:18

## 2022-01-01 RX ADMIN — Medication 10 MILLILITER(S): at 11:33

## 2022-01-01 RX ADMIN — Medication 5 MILLIGRAM(S): at 11:21

## 2022-01-01 RX ADMIN — OXYCODONE HYDROCHLORIDE 10 MILLIGRAM(S): 5 TABLET ORAL at 14:17

## 2022-01-01 RX ADMIN — Medication 650 MILLIGRAM(S): at 21:24

## 2022-01-01 RX ADMIN — TAMSULOSIN HYDROCHLORIDE 0.8 MILLIGRAM(S): 0.4 CAPSULE ORAL at 21:21

## 2022-01-01 RX ADMIN — Medication 40 MILLIEQUIVALENT(S): at 09:57

## 2022-01-01 RX ADMIN — OXYCODONE HYDROCHLORIDE 15 MILLIGRAM(S): 5 TABLET ORAL at 09:20

## 2022-01-01 RX ADMIN — OXYCODONE HYDROCHLORIDE 10 MILLIGRAM(S): 5 TABLET ORAL at 15:56

## 2022-01-01 RX ADMIN — Medication 40 MILLIEQUIVALENT(S): at 10:29

## 2022-01-01 RX ADMIN — FINASTERIDE 5 MILLIGRAM(S): 5 TABLET, FILM COATED ORAL at 11:00

## 2022-01-01 RX ADMIN — Medication 650 MILLIGRAM(S): at 01:35

## 2022-01-01 RX ADMIN — OXYCODONE HYDROCHLORIDE 20 MILLIGRAM(S): 5 TABLET ORAL at 05:54

## 2022-01-01 RX ADMIN — Medication 650 MILLIGRAM(S): at 04:33

## 2022-01-01 RX ADMIN — OXYCODONE HYDROCHLORIDE 30 MILLIGRAM(S): 5 TABLET ORAL at 17:03

## 2022-01-01 RX ADMIN — Medication 5 MILLIGRAM(S): at 05:31

## 2022-01-01 RX ADMIN — Medication 5 MILLIGRAM(S): at 06:04

## 2022-01-01 RX ADMIN — Medication 10 MILLILITER(S): at 05:34

## 2022-01-01 RX ADMIN — OXYCODONE HYDROCHLORIDE 15 MILLIGRAM(S): 5 TABLET ORAL at 11:00

## 2022-01-01 RX ADMIN — Medication 650 MILLIGRAM(S): at 12:59

## 2022-01-01 RX ADMIN — TAMSULOSIN HYDROCHLORIDE 0.8 MILLIGRAM(S): 0.4 CAPSULE ORAL at 22:20

## 2022-01-01 RX ADMIN — TAMSULOSIN HYDROCHLORIDE 0.8 MILLIGRAM(S): 0.4 CAPSULE ORAL at 22:31

## 2022-01-01 RX ADMIN — OXYCODONE HYDROCHLORIDE 15 MILLIGRAM(S): 5 TABLET ORAL at 21:51

## 2022-01-01 RX ADMIN — Medication 650 MILLIGRAM(S): at 22:24

## 2022-01-01 RX ADMIN — Medication 10 MILLILITER(S): at 18:25

## 2022-01-01 RX ADMIN — OXYCODONE HYDROCHLORIDE 5 MILLIGRAM(S): 5 TABLET ORAL at 15:00

## 2022-01-01 RX ADMIN — OXYCODONE HYDROCHLORIDE 10 MILLIGRAM(S): 5 TABLET ORAL at 09:16

## 2022-01-01 RX ADMIN — Medication 1 TABLET(S): at 00:22

## 2022-01-01 RX ADMIN — METHYLNALTREXONE BROMIDE 12 MILLIGRAM(S): 12 INJECTION, SOLUTION SUBCUTANEOUS at 11:32

## 2022-01-01 RX ADMIN — FINASTERIDE 5 MILLIGRAM(S): 5 TABLET, FILM COATED ORAL at 12:32

## 2022-01-01 RX ADMIN — Medication 85 MILLIMOLE(S): at 10:57

## 2022-01-01 RX ADMIN — Medication 10 MILLILITER(S): at 17:03

## 2022-01-01 RX ADMIN — SENNA PLUS 2 TABLET(S): 8.6 TABLET ORAL at 22:20

## 2022-01-01 RX ADMIN — OXYCODONE HYDROCHLORIDE 10 MILLIGRAM(S): 5 TABLET ORAL at 22:40

## 2022-01-01 RX ADMIN — OXYCODONE HYDROCHLORIDE 15 MILLIGRAM(S): 5 TABLET ORAL at 22:31

## 2022-01-01 RX ADMIN — HYDROMORPHONE HYDROCHLORIDE 2 MILLIGRAM(S): 2 INJECTION INTRAMUSCULAR; INTRAVENOUS; SUBCUTANEOUS at 14:08

## 2022-01-01 RX ADMIN — Medication 85 MILLIMOLE(S): at 07:15

## 2022-01-01 RX ADMIN — OXYCODONE HYDROCHLORIDE 20 MILLIGRAM(S): 5 TABLET ORAL at 18:44

## 2022-01-01 RX ADMIN — FINASTERIDE 5 MILLIGRAM(S): 5 TABLET, FILM COATED ORAL at 07:40

## 2022-01-01 RX ADMIN — HYDROMORPHONE HYDROCHLORIDE 0.5 MILLIGRAM(S): 2 INJECTION INTRAMUSCULAR; INTRAVENOUS; SUBCUTANEOUS at 10:51

## 2022-01-01 RX ADMIN — FINASTERIDE 5 MILLIGRAM(S): 5 TABLET, FILM COATED ORAL at 11:32

## 2022-01-01 RX ADMIN — Medication 650 MILLIGRAM(S): at 11:15

## 2022-01-01 RX ADMIN — OXYCODONE HYDROCHLORIDE 15 MILLIGRAM(S): 5 TABLET ORAL at 22:20

## 2022-01-01 RX ADMIN — CEFEPIME 2000 MILLIGRAM(S): 1 INJECTION, POWDER, FOR SOLUTION INTRAMUSCULAR; INTRAVENOUS at 06:27

## 2022-01-01 RX ADMIN — OXYCODONE HYDROCHLORIDE 10 MILLIGRAM(S): 5 TABLET ORAL at 00:45

## 2022-01-01 RX ADMIN — Medication 650 MILLIGRAM(S): at 11:08

## 2022-01-01 RX ADMIN — ENOXAPARIN SODIUM 40 MILLIGRAM(S): 100 INJECTION SUBCUTANEOUS at 05:15

## 2022-01-01 RX ADMIN — OXYCODONE HYDROCHLORIDE 20 MILLIGRAM(S): 5 TABLET ORAL at 18:13

## 2022-01-01 RX ADMIN — SENNA PLUS 2 TABLET(S): 8.6 TABLET ORAL at 21:51

## 2022-01-01 RX ADMIN — LIDOCAINE 2 PATCH: 4 CREAM TOPICAL at 19:49

## 2022-01-01 RX ADMIN — Medication 40 MILLIEQUIVALENT(S): at 08:30

## 2022-01-01 RX ADMIN — OXYCODONE HYDROCHLORIDE 20 MILLIGRAM(S): 5 TABLET ORAL at 18:55

## 2022-01-01 RX ADMIN — OXYCODONE HYDROCHLORIDE 10 MILLIGRAM(S): 5 TABLET ORAL at 03:11

## 2022-01-01 RX ADMIN — OXYCODONE HYDROCHLORIDE 15 MILLIGRAM(S): 5 TABLET ORAL at 23:41

## 2022-01-01 RX ADMIN — SENNA PLUS 2 TABLET(S): 8.6 TABLET ORAL at 22:31

## 2022-01-01 RX ADMIN — Medication 10 MILLILITER(S): at 13:37

## 2022-01-01 RX ADMIN — Medication 100 MILLIEQUIVALENT(S): at 11:26

## 2022-01-01 RX ADMIN — ABIRATERONE ACETATE 1000 MILLIGRAM(S): 250 TABLET ORAL at 05:43

## 2022-01-01 RX ADMIN — OXYCODONE HYDROCHLORIDE 10 MILLIGRAM(S): 5 TABLET ORAL at 11:48

## 2022-01-01 RX ADMIN — OXYCODONE HYDROCHLORIDE 10 MILLIGRAM(S): 5 TABLET ORAL at 21:21

## 2022-01-01 RX ADMIN — OXYCODONE HYDROCHLORIDE 10 MILLIGRAM(S): 5 TABLET ORAL at 18:58

## 2022-01-01 RX ADMIN — Medication 5 MILLIGRAM(S): at 11:00

## 2022-01-01 RX ADMIN — Medication 650 MILLIGRAM(S): at 10:19

## 2022-01-01 RX ADMIN — Medication 100 MILLIGRAM(S): at 18:44

## 2022-01-01 RX ADMIN — OXYCODONE HYDROCHLORIDE 10 MILLIGRAM(S): 5 TABLET ORAL at 13:30

## 2022-01-01 RX ADMIN — ENOXAPARIN SODIUM 40 MILLIGRAM(S): 100 INJECTION SUBCUTANEOUS at 17:02

## 2022-01-01 RX ADMIN — Medication 85 MILLIMOLE(S): at 09:20

## 2022-01-01 RX ADMIN — FINASTERIDE 5 MILLIGRAM(S): 5 TABLET, FILM COATED ORAL at 11:33

## 2022-01-01 RX ADMIN — OXYCODONE HYDROCHLORIDE 10 MILLIGRAM(S): 5 TABLET ORAL at 04:03

## 2022-01-01 RX ADMIN — Medication 200 MILLIGRAM(S): at 10:18

## 2022-01-01 RX ADMIN — OXYCODONE HYDROCHLORIDE 10 MILLIGRAM(S): 5 TABLET ORAL at 05:48

## 2022-01-01 RX ADMIN — Medication 40 MILLIEQUIVALENT(S): at 08:49

## 2022-01-01 RX ADMIN — ABIRATERONE ACETATE 1000 MILLIGRAM(S): 250 TABLET ORAL at 06:08

## 2022-01-01 RX ADMIN — Medication 650 MILLIGRAM(S): at 01:06

## 2022-01-01 RX ADMIN — OXYCODONE HYDROCHLORIDE 10 MILLIGRAM(S): 5 TABLET ORAL at 14:32

## 2022-01-01 RX ADMIN — OXYCODONE HYDROCHLORIDE 10 MILLIGRAM(S): 5 TABLET ORAL at 13:04

## 2022-01-01 RX ADMIN — OXYCODONE HYDROCHLORIDE 10 MILLIGRAM(S): 5 TABLET ORAL at 09:21

## 2022-01-01 RX ADMIN — HYDROMORPHONE HYDROCHLORIDE 2 MILLIGRAM(S): 2 INJECTION INTRAMUSCULAR; INTRAVENOUS; SUBCUTANEOUS at 10:30

## 2022-01-01 RX ADMIN — Medication 100 MILLIGRAM(S): at 23:43

## 2022-01-01 RX ADMIN — AZITHROMYCIN 255 MILLIGRAM(S): 500 TABLET, FILM COATED ORAL at 06:27

## 2022-01-01 RX ADMIN — Medication 250 MILLIGRAM(S): at 20:51

## 2022-01-01 RX ADMIN — Medication 40 MILLIEQUIVALENT(S): at 18:25

## 2022-01-01 RX ADMIN — ENOXAPARIN SODIUM 40 MILLIGRAM(S): 100 INJECTION SUBCUTANEOUS at 16:29

## 2022-01-01 RX ADMIN — OXYCODONE HYDROCHLORIDE 10 MILLIGRAM(S): 5 TABLET ORAL at 05:33

## 2022-01-04 PROBLEM — E83.39 HYPOPHOSPHATEMIA: Status: ACTIVE | Noted: 2022-01-01

## 2022-01-18 PROBLEM — M51.9 LUMBAR DISC DISEASE: Status: ACTIVE | Noted: 2022-01-01

## 2022-01-18 NOTE — HISTORY OF PRESENT ILLNESS
[FreeTextEntry8] : developed lumbar pain radiating down left leg has oxy rx for metastatic prostate cancer sees oncology

## 2022-01-27 NOTE — RESULTS/DATA
[FreeTextEntry1] : 9/24/21: Bone scan: Pt had bone scan at the hospital that showed multifocal osseous mets in the axial and appendicular skeleton. Lesions in the left femoral neck/proximal femur, and bilateral proximal humeri may place the patient at risk for pathologic fractures.\par \par 9/22/21: Path 1  Femoral head. Bone and soft tissue, right hip fracture, arthroplasty\par - Femoral head with neck fracture\par - Pathologic fracture with METASTATIC ADENOCARCINOMA, granulation tissue\par \par 9/1/21: 1. Prostate, right and left apex, biopsy\par -Adenocarcinoma of the prostate, Prognostic Grade Group 5\par           (Grupo score 4+5=9) involving 100% (15 mm and 13 mm in length\par           respectively) of 2 of 2 core(s). [place] pattern 5 comprises 5%\par           of tumor.\par -Perineural invasion is identified\par \par           2. Prostate, right and left apex, biopsy\par -Adenocarcinoma of the prostate, Prognostic Grade Group 5\par           (Grupo score 4+5=9) involving 50%, 50%, and 80% (2 mm, 6 mm,\par           and 7 mm in length respectively) of 3 of 3 core(s). [place]\par           pattern 5 comprises 5% of tumor.\par -Perineural invasion is identified\par \par \par 8/4/21 CT ap IMPRESSION: Marked enlargement of the prostate suggestive of malignancy with evidence of extracapsular extension to the cul-de-sac and posterior bladder wall and right UVJ with resulting moderate right hydroureteronephrosis. Loculated fluid collection in the deep pelvis with circumferential rind of nodular tumor, with mass effect upon the distal rectum. Extensive retroperitoneal, para-aortic, and paracaval adenopathy. Extensive bony metastases.\par \par 8/4/21 MRI  : Large neoplasm encompassing nearly the entire prostate gland with gross extracapsular disease. Invasion into the base of the urinary bladder obstructing the right ureterovesical junction. Bilateral neurovascular bundle involvement. Bilateral seminal vesicle invasion with large cystic dilatation of the seminal vesicle secondary to obstruction with tumor nodularity along the wall.\par *PIRADS 5 - Very high (clinically significant cancer is highly likely to be present)\par \par Extensive pelvic lymphadenopathy and diffuse bone metastases.\par \par *Lesion descriptors and assessment categories are formulated utilizing PI-RADS v2.1.\par

## 2022-01-27 NOTE — HISTORY OF PRESENT ILLNESS
[de-identified] : Mr. JACK UMANZOR is a 71 year old male initially presented to urology in Oct 2020 for urinary frequency. He was treated for BPH. His PSA were checked in july 2021 and noted to be elevated to 1079. Further workup with MRI showed large neoplasm encompassing nearly the entire prostate gland with gross extracapsular disease. Invasion into the base of the urinary bladder obstructing the right ureterovesical junction. Bilateral neurovascular bundle involvement. Bilateral seminal vesicle invasion with large cystic  dilatation of the seminal vesicle secondary to obstruction with tumor nodularity along the wall. Extensive pelvic lymphadenopathy and diffuse bone metastases. Subsequent bx showed Adenocarcinoma of the prostate, Prognostic Grade Group 5 ([place] score 4+5=9). Pt had a pathologic fracture of his right hip in September no s/p hip replacement on 9/22/21. Now recovering well. He was started on bicalutamide by urology.\par \par Pt had bone scan at the hospital that showed multifocal osseous mets in the axial and appendicular skeleton. Lesions in the left femoral neck/proximal femur, and bilateral proximal humeri may place the patient at risk for pathologic fractures.\par \par He is here to discuss further management of metastatic prostate cancer. Prior to this, his functional status has been excellent. Apart from BPH, he does not have any other comorbid conditions. He lives alone and has a wood workshop. His brother and sisters are nearby on the same street.\par \par PSA\par 7/2/21 - 921\par 7/20/21 - 1079 \par \par \par \par \par  [de-identified] : 11/4/21: Pt is tolerating abiraterone and lupron. Denies hot flashes. Urinary frequency has improved. He gets up about 4 times per night.  self cath at night for urination. He has right hip pain so he is not as active and uses a cane. Denies HA. CP, SOB, abd pain, constipation, diarrhea, melena, hematuria, dysuria. \par \par 12/23/21: pt has pain in the whole rib cage.  . CXR showed . Age indeterminate possibly pathologic anterior left 8th rib fracture deformity near costochondral junction. Multifocal predominantly osteosclerotic however heterogeneous mixed density osseous metastatic disease.  On oxycodone prn.  Started having hot flashes which are tolerable. Occasional  self cath for urination. He would lke to start doing Wood work, advised not to lift too heavy of a weight. \par \par 1/27/22. Pt had acute lower back pain with radiculopathy recently and went to see ortho. He had MR spine on 1/19/22 Diffuse osseous metastatic disease. Includes heterogeneous signal abnormality throughout the sacral ala. Superimposed insufficiency fracture is not excluded. Multilevel spondylosis. He is taking pain meds with good relief. Rib cage pain mostly resolved. He started working the past few weeks and it made him feels good. Denies HA. CP, SOB, abd pain, constipation, diarrhea, melena, hematuria, dysuria. \par

## 2022-01-27 NOTE — ASSESSMENT
[FreeTextEntry1] : Mr. JACK UMANZOR is a 71 year old male with denovo metastatic prostate cancer diagnosed on 9/22 prostate bx that showed Adenocarcinoma of the prostate, Prognostic Grade Group 5 (Grupo score 4+5=9). Pt had a pathologic fracture of his right hip in September no s/p hip replacement on 9/22/21. Now recovering well. He was started on bicalutamide by urology. \par \par # denovo metastatic prostate cancer to the bones\par -Pt on bicalutamide started by urology - will stop after he runs out\par -continue Lupron +  abiraterone + prednisone for mCSPC\par -started prolia after dental clearance\par -vitamin D + calcium for bone health\par -Bone scan showed multifocal osseous mets in the axial and appendicular skeleton. Lesions in the left femoral neck/proximal femur, and bilateral proximal humeri may place the patient at risk for pathologic fractures.\par -pt has rib cage pain has mostly resolved, Xray showed left ant 8th rib age indeterminant fracture\par -MR spine on 1/19/22 Diffuse osseous metastatic disease. Includes heterogeneous signal abnormality throughout the sacral ala. Superimposed insufficiency fracture is not excluded. Multilevel spondylosis\par -PSA trending down\par \par RTC in 4 weeks \par

## 2022-02-18 PROBLEM — M25.551 RIGHT HIP PAIN: Status: ACTIVE | Noted: 2021-09-21

## 2022-02-18 NOTE — PHYSICAL EXAM
[No Acute Distress] : no acute distress [Well Nourished] : well nourished [Well Developed] : well developed [Normal Sclera/Conjunctiva] : normal sclera/conjunctiva [No Respiratory Distress] : no respiratory distress  [No Accessory Muscle Use] : no accessory muscle use [Clear to Auscultation] : lungs were clear to auscultation bilaterally [Normal Rate] : normal rate  [Regular Rhythm] : with a regular rhythm [Normal S1, S2] : normal S1 and S2 [Soft] : abdomen soft [Non Tender] : non-tender [Speech Grossly Normal] : speech grossly normal [Normal Affect] : the affect was normal [de-identified] : Good range of motion of Left and Right hip with flexion, abduction, internal/external rotation. Good strength in LE . Pain w/ active resisted flexion of R thigh

## 2022-02-18 NOTE — ASSESSMENT
[FreeTextEntry1] : R groin/hip pain - likely groin strain. cont aleve prn, oxycodone prn severe pain. check xr hip. PT if no improvement

## 2022-02-18 NOTE — HISTORY OF PRESENT ILLNESS
[FreeTextEntry8] : Pt c/o R hip/groin pain. Hx of R hip replacement on 9/2021 with Dr. Edmond.  However, 4 days ago, he had a mechanical fall where he had misstepped going downstairs and fell onto his right hip. Since then, pain has been much worse and hasn't improved at all. Pain is worse when he gets out of bed or stands after sitting for long periods of time. Pain worse w/ trying to adduct/abduct his R thigh when trying to get out of bed. 5-6/10 when walking. Pain is scaled as 10/10 at its worst. Has been taking oxycodone 10mg and Aleve once a day each. Denies any fevers or chills.

## 2022-02-18 NOTE — REVIEW OF SYSTEMS
[Joint Pain] : joint pain [Fever] : no fever [Chills] : no chills [Chest Pain] : no chest pain [Shortness Of Breath] : no shortness of breath [Abdominal Pain] : no abdominal pain [Nausea] : no nausea [Vomiting] : no vomiting [Dizziness] : no dizziness [FreeTextEntry9] : admits to medial thigh pain

## 2022-04-04 PROBLEM — E83.51 HYPOCALCEMIA: Status: ACTIVE | Noted: 2021-12-23

## 2022-04-04 NOTE — ASSESSMENT
[FreeTextEntry1] : Mr. JACK UMANZOR is a 71 year old male with denovo metastatic prostate cancer diagnosed on 9/22 prostate bx that showed Adenocarcinoma of the prostate, Prognostic Grade Group 5 (Grupo score 4+5=9). Pt had a pathologic fracture of his right hip in September no s/p hip replacement on 9/22/21. Now recovering well. He was started on bicalutamide by urology. \par \par # denovo metastatic prostate cancer to the bones\par -s/p bicalutamide, initially started by urology \par -continue Lupron Q3 months + abiraterone and prednisone daily for mCSPC\par -continue prolia Q6 months\par -vitamin D + calcium for bone health\par -Bone scan on 9/24/21 showed multifocal osseous mets in the axial and appendicular skeleton. Lesions in the left femoral neck/proximal femur, and bilateral proximal humeri may place the patient at risk for pathologic fractures.\par -pt has rib cage pain has mostly resolved, Xray showed left ant 8th rib age indeterminant fracture.\par -MR spine on 1/19/22 showed diffuse osseous metastatic disease. Includes heterogeneous signal abnormality throughout the sacral ala. Superimposed insufficiency fracture is not excluded. Multilevel spondylosis\par -PSA has been trending down, today's results pending\par -RTC in early May for next prolia and early July for next Elioron and Dr. Pena follow up\par \par #Supportive Care\par -pain fairly well controlled with oxycodone prescribed by his PCP\par -CMP results pending, recently with hypokalemia and hypocalcemia, will call with results

## 2022-04-04 NOTE — HISTORY OF PRESENT ILLNESS
[de-identified] : Mr. JACK UMANZOR is a 71 year old male initially presented to urology in Oct 2020 for urinary frequency. He was treated for BPH. His PSA were checked in july 2021 and noted to be elevated to 1079. Further workup with MRI showed large neoplasm encompassing nearly the entire prostate gland with gross extracapsular disease. Invasion into the base of the urinary bladder obstructing the right ureterovesical junction. Bilateral neurovascular bundle involvement. Bilateral seminal vesicle invasion with large cystic  dilatation of the seminal vesicle secondary to obstruction with tumor nodularity along the wall. Extensive pelvic lymphadenopathy and diffuse bone metastases. Subsequent bx showed Adenocarcinoma of the prostate, Prognostic Grade Group 5 ([place] score 4+5=9). Pt had a pathologic fracture of his right hip in September no s/p hip replacement on 9/22/21. Now recovering well. He was started on bicalutamide by urology.\par \par Pt had bone scan at the hospital that showed multifocal osseous mets in the axial and appendicular skeleton. Lesions in the left femoral neck/proximal femur, and bilateral proximal humeri may place the patient at risk for pathologic fractures.\par \par He is here to discuss further management of metastatic prostate cancer. Prior to this, his functional status has been excellent. Apart from BPH, he does not have any other comorbid conditions. He lives alone and has a wood workshop. His brother and sisters are nearby on the same street.\par \par PSA\par 7/2/21 - 921\par 7/20/21 - 1079 \par \par \par \par \par  [de-identified] : 11/4/21: Pt is tolerating abiraterone and lupron. Denies hot flashes. Urinary frequency has improved. He gets up about 4 times per night.  self cath at night for urination. He has right hip pain so he is not as active and uses a cane. Denies HA. CP, SOB, abd pain, constipation, diarrhea, melena, hematuria, dysuria. \par \par 12/23/21: pt has pain in the whole rib cage.  . CXR showed . Age indeterminate possibly pathologic anterior left 8th rib fracture deformity near costochondral junction. Multifocal predominantly osteosclerotic however heterogeneous mixed density osseous metastatic disease.  On oxycodone prn.  Started having hot flashes which are tolerable. Occasional  self cath for urination. He would lke to start doing Wood work, advised not to lift too heavy of a weight. \par \par 1/27/22. Pt had acute lower back pain with radiculopathy recently and went to see ortho. He had MR spine on 1/19/22 Diffuse osseous metastatic disease. Includes heterogeneous signal abnormality throughout the sacral ala. Superimposed insufficiency fracture is not excluded. Multilevel spondylosis. He is taking pain meds with good relief. Rib cage pain mostly resolved. He started working the past few weeks and it made him feels good. Denies HA. CP, SOB, abd pain, constipation, diarrhea, melena, hematuria, dysuria. \par \par 4/4/22: Patient presents for follow up and 3rd Q3 month Lupron injection for prostate cancer.  He is also taking Zytiga and prednisone daily.\par + Intermittent rib pain, spine pain and right hip pain. + Occasional LE edema, ankles. + Hot flashes resolved, only occurred after C1. + Nocturia improved, urinates ~4x/night, improved from ~hourly. Also straight caths 1-2x/day.  No myalgia, flushing/diaphoresis, diarrhea, cough or dyspepsia.  \par

## 2022-04-04 NOTE — RESULTS/DATA
[FreeTextEntry1] : 9/24/21: Bone scan: Pt had bone scan at the hospital that showed multifocal osseous mets in the axial and appendicular skeleton. Lesions in the left femoral neck/proximal femur, and bilateral proximal humeri may place the patient at risk for pathologic fractures.\par \par 9/22/21: Path 1  Femoral head. Bone and soft tissue, right hip fracture, arthroplasty\par - Femoral head with neck fracture\par - Pathologic fracture with METASTATIC ADENOCARCINOMA, granulation tissue\par \par 9/1/21: 1. Prostate, right and left apex, biopsy\par -Adenocarcinoma of the prostate, Prognostic Grade Group 5\par           (Grupo score 4+5=9) involving 100% (15 mm and 13 mm in length\par           respectively) of 2 of 2 core(s). [place] pattern 5 comprises 5%\par           of tumor.\par -Perineural invasion is identified\par \par           2. Prostate, right and left apex, biopsy\par -Adenocarcinoma of the prostate, Prognostic Grade Group 5\par           ([place]upo score 4+5=9) involving 50%, 50%, and 80% (2 mm, 6 mm,\par           and 7 mm in length respectively) of 3 of 3 core(s). [place]\par           pattern 5 comprises 5% of tumor.\par -Perineural invasion is identified\par \par \par 8/4/21 CT ap IMPRESSION: Marked enlargement of the prostate suggestive of malignancy with evidence of extracapsular extension to the cul-de-sac and posterior bladder wall and right UVJ with resulting moderate right hydroureteronephrosis. Loculated fluid collection in the deep pelvis with circumferential rind of nodular tumor, with mass effect upon the distal rectum. Extensive retroperitoneal, para-aortic, and paracaval adenopathy. Extensive bony metastases.\par \par 8/4/21 MRI  : Large neoplasm encompassing nearly the entire prostate gland with gross extracapsular disease. Invasion into the base of the urinary bladder obstructing the right ureterovesical junction. Bilateral neurovascular bundle involvement. Bilateral seminal vesicle invasion with large cystic dilatation of the seminal vesicle secondary to obstruction with tumor nodularity along the wall.\par *PIRADS 5 - Very high (clinically significant cancer is highly likely to be present)\par \par Extensive pelvic lymphadenopathy and diffuse bone metastases.\par \par *Lesion descriptors and assessment categories are formulated utilizing PI-RADS v2.1.\par

## 2022-04-11 NOTE — REASON FOR VISIT
[Initial Consultation] : an initial consultation [FreeTextEntry2] : prostate cancer (4) no apparent problem

## 2022-07-06 PROBLEM — M54.50 LOW BACK PAIN: Status: ACTIVE | Noted: 2021-06-11

## 2022-07-06 NOTE — HISTORY OF PRESENT ILLNESS
[de-identified] : 11/4/21: Pt is tolerating abiraterone and lupron. Denies hot flashes. Urinary frequency has improved. He gets up about 4 times per night.  self cath at night for urination. He has right hip pain so he is not as active and uses a cane. Denies HA. CP, SOB, abd pain, constipation, diarrhea, melena, hematuria, dysuria. \par \par 12/23/21: pt has pain in the whole rib cage.  . CXR showed . Age indeterminate possibly pathologic anterior left 8th rib fracture deformity near costochondral junction. Multifocal predominantly osteosclerotic however heterogeneous mixed density osseous metastatic disease.  On oxycodone prn.  Started having hot flashes which are tolerable. Occasional  self cath for urination. He would lke to start doing Wood work, advised not to lift too heavy of a weight. \par \par 1/27/22. Pt had acute lower back pain with radiculopathy recently and went to see ortho. He had MR spine on 1/19/22 Diffuse osseous metastatic disease. Includes heterogeneous signal abnormality throughout the sacral ala. Superimposed insufficiency fracture is not excluded. Multilevel spondylosis. He is taking pain meds with good relief. Rib cage pain mostly resolved. He started working the past few weeks and it made him feels good. Denies HA. CP, SOB, abd pain, constipation, diarrhea, melena, hematuria, dysuria. \par \par 4/4/22: Patient presents for follow up and 3rd Q3 month Lupron injection for prostate cancer. He is also taking Zytiga and prednisone daily.\par + Intermittent rib pain, spine pain and right hip pain. + Occasional LE edema, ankles. + Hot flashes resolved, only occurred after C1. + Nocturia improved, urinates ~4x/night, improved from ~hourly. Also straight caths 1-2x/day. No myalgia, flushing/diaphoresis, diarrhea, cough or dyspepsia.\par \par \par 7/6/22: Patient presents for follow up and 3rd Q3 month Lupron injection for prostate cancer. He is also taking Zytiga and prednisone daily.\par pt has pelvis pain, taking oxycodon q6hr with good relief. Pain with getting up.  Rib cage pain is improved. Hot flashes resolved after C1.  urinates ~4x/night, improved from ~hourly. Also straight caths 1-2x/day. He started working in his wood workshop. [de-identified] : Mr. JACK UMANZOR is a 71 year old male initially presented to urology in Oct 2020 for urinary frequency. He was treated for BPH. His PSA were checked in july 2021 and noted to be elevated to 1079. Further workup with MRI showed large neoplasm encompassing nearly the entire prostate gland with gross extracapsular disease. Invasion into the base of the urinary bladder obstructing the right ureterovesical junction. Bilateral neurovascular bundle involvement. Bilateral seminal vesicle invasion with large cystic  dilatation of the seminal vesicle secondary to obstruction with tumor nodularity along the wall. Extensive pelvic lymphadenopathy and diffuse bone metastases. Subsequent bx showed Adenocarcinoma of the prostate, Prognostic Grade Group 5 ([place] score 4+5=9). Pt had a pathologic fracture of his right hip in September no s/p hip replacement on 9/22/21. Now recovering well. He was started on bicalutamide by urology.\par \par Pt had bone scan at the hospital that showed multifocal osseous mets in the axial and appendicular skeleton. Lesions in the left femoral neck/proximal femur, and bilateral proximal humeri may place the patient at risk for pathologic fractures.\par \par He is here to discuss further management of metastatic prostate cancer. Prior to this, his functional status has been excellent. Apart from BPH, he does not have any other comorbid conditions. He lives alone and has a wood workshop. His brother and sisters are nearby on the same street.\par \par PSA\par 7/2/21 - 921\par 7/20/21 - 1079 \par \par \par \par \par

## 2022-07-06 NOTE — ASSESSMENT
[FreeTextEntry1] : Mr. JACK UMANZOR is a 71 year old male with denovo metastatic prostate cancer diagnosed on 9/22 prostate bx that showed Adenocarcinoma of the prostate, Prognostic Grade Group 5 ([place] score 4+5=9). Pt had a pathologic fracture of his right hip in September no s/p hip replacement on 9/22/21. Now recovering well. He was started on bicalutamide by urology. \par \par # denovo metastatic prostate cancer to the bones\par -Pt on bicalutamide started by urology - stopped after he rans out\par -continue Lupron +  abiraterone + prednisone for mCSPC\par -started prolia after dental clearance\par -vitamin D + calcium for bone health\par -Bone scan showed multifocal osseous mets in the axial and appendicular skeleton. Lesions in the left femoral neck/proximal femur, and bilateral proximal humeri may place the patient at risk for pathologic fractures.\par -pt has rib cage pain has mostly resolved, Xray showed left ant 8th rib age indeterminant fracture\par -MR spine on 1/19/22 Diffuse osseous metastatic disease. Includes heterogeneous signal abnormality throughout the sacral ala. Superimposed insufficiency fracture is not excluded. Multilevel spondylosis\par -PSA trending down\par -will repeat imaging given recent pain in the pelvic region\par \par RTC in 4 weeks \par

## 2022-07-06 NOTE — RESULTS/DATA
[FreeTextEntry1] : 9/24/21: Bone scan: Pt had bone scan at the hospital that showed multifocal osseous mets in the axial and appendicular skeleton. Lesions in the left femoral neck/proximal femur, and bilateral proximal humeri may place the patient at risk for pathologic fractures.\par \par 9/22/21: Path 1  Femoral head. Bone and soft tissue, right hip fracture, arthroplasty\par - Femoral head with neck fracture\par - Pathologic fracture with METASTATIC ADENOCARCINOMA, granulation tissue\par \par 9/1/21: 1. Prostate, right and left apex, biopsy\par -Adenocarcinoma of the prostate, Prognostic Grade Group 5\par           ([place] score 4+5=9) involving 100% (15 mm and 13 mm in length\par           respectively) of 2 of 2 core(s). [place] pattern 5 comprises 5%\par           of tumor.\par -Perineural invasion is identified\par \par           2. Prostate, right and left apex, biopsy\par -Adenocarcinoma of the prostate, Prognostic Grade Group 5\par           ([place] score 4+5=9) involving 50%, 50%, and 80% (2 mm, 6 mm,\par           and 7 mm in length respectively) of 3 of 3 core(s). [place]\par           pattern 5 comprises 5% of tumor.\par -Perineural invasion is identified\par \par \par 8/4/21 CT ap IMPRESSION: Marked enlargement of the prostate suggestive of malignancy with evidence of extracapsular extension to the cul-de-sac and posterior bladder wall and right UVJ with resulting moderate right hydroureteronephrosis. Loculated fluid collection in the deep pelvis with circumferential rind of nodular tumor, with mass effect upon the distal rectum. Extensive retroperitoneal, para-aortic, and paracaval adenopathy. Extensive bony metastases.\par \par 8/4/21 MRI  : Large neoplasm encompassing nearly the entire prostate gland with gross extracapsular disease. Invasion into the base of the urinary bladder obstructing the right ureterovesical junction. Bilateral neurovascular bundle involvement. Bilateral seminal vesicle invasion with large cystic dilatation of the seminal vesicle secondary to obstruction with tumor nodularity along the wall.\par *PIRADS 5 - Very high (clinically significant cancer is highly likely to be present)\par \par Extensive pelvic lymphadenopathy and diffuse bone metastases.\par \par *Lesion descriptors and assessment categories are formulated utilizing PI-RADS v2.1.\par

## 2022-08-05 PROBLEM — S46.912A LEFT SHOULDER STRAIN: Status: ACTIVE | Noted: 2021-09-01

## 2022-08-05 NOTE — RESULTS/DATA
[FreeTextEntry1] : 9/24/21: Bone scan: Pt had bone scan at the hospital that showed multifocal osseous mets in the axial and appendicular skeleton. Lesions in the left femoral neck/proximal femur, and bilateral proximal humeri may place the patient at risk for pathologic fractures.\par \par 9/22/21: Path 1  Femoral head. Bone and soft tissue, right hip fracture, arthroplasty\par - Femoral head with neck fracture\par - Pathologic fracture with METASTATIC ADENOCARCINOMA, granulation tissue\par \par 9/1/21: 1. Prostate, right and left apex, biopsy\par -Adenocarcinoma of the prostate, Prognostic Grade Group 5\par           ([place] score 4+5=9) involving 100% (15 mm and 13 mm in length\par           respectively) of 2 of 2 core(s). [place] pattern 5 comprises 5%\par           of tumor.\par -Perineural invasion is identified\par \par           2. Prostate, right and left apex, biopsy\par -Adenocarcinoma of the prostate, Prognostic Grade Group 5\par           ([place] score 4+5=9) involving 50%, 50%, and 80% (2 mm, 6 mm,\par           and 7 mm in length respectively) of 3 of 3 core(s). [place]\par           pattern 5 comprises 5% of tumor.\par -Perineural invasion is identified\par \par \par 8/4/21 CT ap IMPRESSION: Marked enlargement of the prostate suggestive of malignancy with evidence of extracapsular extension to the cul-de-sac and posterior bladder wall and right UVJ with resulting moderate right hydroureteronephrosis. Loculated fluid collection in the deep pelvis with circumferential rind of nodular tumor, with mass effect upon the distal rectum. Extensive retroperitoneal, para-aortic, and paracaval adenopathy. Extensive bony metastases.\par \par 8/4/21 MRI  : Large neoplasm encompassing nearly the entire prostate gland with gross extracapsular disease. Invasion into the base of the urinary bladder obstructing the right ureterovesical junction. Bilateral neurovascular bundle involvement. Bilateral seminal vesicle invasion with large cystic dilatation of the seminal vesicle secondary to obstruction with tumor nodularity along the wall.\par *PIRADS 5 - Very high (clinically significant cancer is highly likely to be present)\par \par Extensive pelvic lymphadenopathy and diffuse bone metastases.\par \par *Lesion descriptors and assessment categories are formulated utilizing PI-RADS v2.1.\par

## 2022-08-05 NOTE — ASSESSMENT
[FreeTextEntry1] : Mr. JACK UMANZOR is a 71 year old male with denovo metastatic prostate cancer diagnosed on 9/22 prostate bx that showed Adenocarcinoma of the prostate, Prognostic Grade Group 5 ([place] score 4+5=9). Pt had a pathologic fracture of his right hip in September no s/p hip replacement on 9/22/21. Now recovering well. He was started on bicalutamide by urology. \par \par # denovo metastatic prostate cancer to the bones\par -Pt on bicalutamide started by urology - stopped after he rans out\par -continue Lupron +  abiraterone + prednisone for mCSPC\par -started prolia after dental clearance\par -vitamin D + calcium for bone health\par -Bone scan showed multifocal osseous mets in the axial and appendicular skeleton. Lesions in the left femoral neck/proximal femur, and bilateral proximal humeri may place the patient at risk for pathologic fractures.\par -pt has rib cage pain has mostly resolved, Xray showed left ant 8th rib age indeterminant fracture\par -MR spine on 1/19/22 Diffuse osseous metastatic disease. Includes heterogeneous signal abnormality throughout the sacral ala. Superimposed insufficiency fracture is not excluded. Multilevel spondylosis\par -CT c/ap done on 8/3/22 showed improvement from previous examination. Improved but persistent retroperitoneal and pelvic adenopathy. Extensive bony metastases.\par -PSA trending down\par -continue current regimen\par \par \par # Supportive\par -bone pain - prn oxycodone\par -constipation - colace\par \par RTC in 4 weeks \par

## 2022-08-05 NOTE — HISTORY OF PRESENT ILLNESS
[de-identified] : Mr. JACK UMANZOR is a 71 year old male initially presented to urology in Oct 2020 for urinary frequency. He was treated for BPH. His PSA were checked in july 2021 and noted to be elevated to 1079. Further workup with MRI showed large neoplasm encompassing nearly the entire prostate gland with gross extracapsular disease. Invasion into the base of the urinary bladder obstructing the right ureterovesical junction. Bilateral neurovascular bundle involvement. Bilateral seminal vesicle invasion with large cystic  dilatation of the seminal vesicle secondary to obstruction with tumor nodularity along the wall. Extensive pelvic lymphadenopathy and diffuse bone metastases. Subsequent bx showed Adenocarcinoma of the prostate, Prognostic Grade Group 5 ([place] score 4+5=9). Pt had a pathologic fracture of his right hip in September no s/p hip replacement on 9/22/21. Now recovering well. He was started on bicalutamide by urology.\par \par Pt had bone scan at the hospital that showed multifocal osseous mets in the axial and appendicular skeleton. Lesions in the left femoral neck/proximal femur, and bilateral proximal humeri may place the patient at risk for pathologic fractures.\par \par He is here to discuss further management of metastatic prostate cancer. Prior to this, his functional status has been excellent. Apart from BPH, he does not have any other comorbid conditions. He lives alone and has a wood workshop. His brother and sisters are nearby on the same street.\par \par PSA\par 7/2/21 - 921\par 7/20/21 - 1079 \par \par \par \par \par  [de-identified] : 11/4/21: Pt is tolerating abiraterone and lupron. Denies hot flashes. Urinary frequency has improved. He gets up about 4 times per night.  self cath at night for urination. He has right hip pain so he is not as active and uses a cane. Denies HA. CP, SOB, abd pain, constipation, diarrhea, melena, hematuria, dysuria. \par \par 12/23/21: pt has pain in the whole rib cage.  . CXR showed . Age indeterminate possibly pathologic anterior left 8th rib fracture deformity near costochondral junction. Multifocal predominantly osteosclerotic however heterogeneous mixed density osseous metastatic disease.  On oxycodone prn.  Started having hot flashes which are tolerable. Occasional  self cath for urination. He would lke to start doing Wood work, advised not to lift too heavy of a weight. \par \par 1/27/22. Pt had acute lower back pain with radiculopathy recently and went to see ortho. He had MR spine on 1/19/22 Diffuse osseous metastatic disease. Includes heterogeneous signal abnormality throughout the sacral ala. Superimposed insufficiency fracture is not excluded. Multilevel spondylosis. He is taking pain meds with good relief. Rib cage pain mostly resolved. He started working the past few weeks and it made him feels good. Denies HA. CP, SOB, abd pain, constipation, diarrhea, melena, hematuria, dysuria. \par \par 4/4/22: Patient presents for follow up and 3rd Q3 month Lupron injection for prostate cancer. He is also taking Zytiga and prednisone daily.\par + Intermittent rib pain, spine pain and right hip pain. + Occasional LE edema, ankles. + Hot flashes resolved, only occurred after C1. + Nocturia improved, urinates ~4x/night, improved from ~hourly. Also straight caths 1-2x/day. No myalgia, flushing/diaphoresis, diarrhea, cough or dyspepsia.\par \par \par 7/6/22: Patient presents for follow up and 3rd Q3 month Lupron injection for prostate cancer. He is also taking Zytiga and prednisone daily.\par pt has pelvis pain, taking oxycodon q6hr with good relief. Pain with getting up.  Rib cage pain is improved. Hot flashes resolved after C1.  urinates ~4x/night, improved from ~hourly. Also straight caths 1-2x/day. He started working in his wood workshop.\par \par \par 8/5/22: Patient presents for follow up  prostate cancer on Lupron, zytiga. He had CT c/ap done on 8/3/22 for hip pain. It showed improvement from previous examination. Improved but persistent retroperitoneal and pelvic adenopathy. Extensive bony metastases.\par pt has pelvis pain, taking oxycodon q6hr with good relief. Pain with getting up.  Rib cage pain is improved. Hot flashes resolved after C1.  urinates ~4x/night, improved from ~hourly. Also straight caths 1-2x/day. He started working in his wood workshop.\par

## 2022-09-21 PROBLEM — M25.559 HIP PAIN: Status: ACTIVE | Noted: 2021-07-27

## 2022-09-21 PROBLEM — Z96.641 HISTORY OF RIGHT HIP HEMIARTHROPLASTY: Status: ACTIVE | Noted: 2021-10-12

## 2022-09-21 NOTE — PHYSICAL EXAM
[Normal] : normal rate, regular rhythm, normal S1 and S2 and no murmur heard [de-identified] : using walker.

## 2022-09-21 NOTE — ASSESSMENT
[FreeTextEntry1] : DOing well\par cont PT\par cont exercise\par discussed fall prevention\par f.u for annual physical\par cont with onc

## 2022-09-21 NOTE — HISTORY OF PRESENT ILLNESS
[Post-hospitalization from ___ Hospital] : Post-hospitalization from [unfilled] Hospital [Admitted on: ___] : The patient was admitted on [unfilled] [Discharged on ___] : discharged on [unfilled] [Discharge Summary] : discharge summary [Radiology Findings] : radiology findings [Discharge Med List] : discharge medication list [Med Reconciliation] : medication reconciliation has been completed [Patient Contacted By: ____] : and contacted by [unfilled] [FreeTextEntry2] : PT presenting s/p fall at home. Had left hip replacement done approx 4 weeks ago.\par Had right hip replaced 1 year ago\par Doing better, walks with walker. \par Was discharged from rehab. \par PT coming to home. Does have some stairs upstairs, goes down stairs sometimes with a . \par Occasionally numbness and tingling from cancer treatment \par

## 2022-09-21 NOTE — REVIEW OF SYSTEMS
[Chest Pain] : chest pain [Shortness Of Breath] : no shortness of breath [Abdominal Pain] : no abdominal pain [Joint Pain] : joint pain [Back Pain] : back pain [Joint Swelling] : joint swelling [Negative] : Constitutional

## 2022-09-21 NOTE — PHYSICAL EXAM
[Normal] : normal rate, regular rhythm, normal S1 and S2 and no murmur heard [de-identified] : using walker.

## 2022-10-28 NOTE — HISTORY OF PRESENT ILLNESS
[de-identified] : Mr. JACK UMANZOR is a 71 year old male initially presented to urology in Oct 2020 for urinary frequency. He was treated for BPH. His PSA were checked in july 2021 and noted to be elevated to 1079. Further workup with MRI showed large neoplasm encompassing nearly the entire prostate gland with gross extracapsular disease. Invasion into the base of the urinary bladder obstructing the right ureterovesical junction. Bilateral neurovascular bundle involvement. Bilateral seminal vesicle invasion with large cystic  dilatation of the seminal vesicle secondary to obstruction with tumor nodularity along the wall. Extensive pelvic lymphadenopathy and diffuse bone metastases. Subsequent bx showed Adenocarcinoma of the prostate, Prognostic Grade Group 5 ([place] score 4+5=9). Pt had a pathologic fracture of his right hip in September no s/p hip replacement on 9/22/21. Now recovering well. He was started on bicalutamide by urology.\par \par Pt had bone scan at the hospital that showed multifocal osseous mets in the axial and appendicular skeleton. Lesions in the left femoral neck/proximal femur, and bilateral proximal humeri may place the patient at risk for pathologic fractures.\par \par He is here to discuss further management of metastatic prostate cancer. Prior to this, his functional status has been excellent. Apart from BPH, he does not have any other comorbid conditions. He lives alone and has a wood workshop. His brother and sisters are nearby on the same street.\par \par PSA\par 7/2/21 - 921\par 7/20/21 - 1079 \par \par \par \par \par  [de-identified] : 11/4/21: Pt is tolerating abiraterone and lupron. Denies hot flashes. Urinary frequency has improved. He gets up about 4 times per night.  self cath at night for urination. He has right hip pain so he is not as active and uses a cane. Denies HA. CP, SOB, abd pain, constipation, diarrhea, melena, hematuria, dysuria. \par \par 12/23/21: pt has pain in the whole rib cage.  . CXR showed . Age indeterminate possibly pathologic anterior left 8th rib fracture deformity near costochondral junction. Multifocal predominantly osteosclerotic however heterogeneous mixed density osseous metastatic disease.  On oxycodone prn.  Started having hot flashes which are tolerable. Occasional  self cath for urination. He would lke to start doing Wood work, advised not to lift too heavy of a weight. \par \par 1/27/22. Pt had acute lower back pain with radiculopathy recently and went to see ortho. He had MR spine on 1/19/22 Diffuse osseous metastatic disease. Includes heterogeneous signal abnormality throughout the sacral ala. Superimposed insufficiency fracture is not excluded. Multilevel spondylosis. He is taking pain meds with good relief. Rib cage pain mostly resolved. He started working the past few weeks and it made him feels good. Denies HA. CP, SOB, abd pain, constipation, diarrhea, melena, hematuria, dysuria. \par \par 4/4/22: Patient presents for follow up and 3rd Q3 month Lupron injection for prostate cancer. He is also taking Zytiga and prednisone daily.\par + Intermittent rib pain, spine pain and right hip pain. + Occasional LE edema, ankles. + Hot flashes resolved, only occurred after C1. + Nocturia improved, urinates ~4x/night, improved from ~hourly. Also straight caths 1-2x/day. No myalgia, flushing/diaphoresis, diarrhea, cough or dyspepsia.\par \par 7/6/22: Patient presents for follow up and 3rd Q3 month Lupron injection for prostate cancer. He is also taking Zytiga and prednisone daily.\par pt has pelvis pain, taking oxycodon q6hr with good relief. Pain with getting up.  Rib cage pain is improved. Hot flashes resolved after C1.  urinates ~4x/night, improved from ~hourly. Also straight caths 1-2x/day. He started working in his wood workshop.\par \par 8/5/22: Patient presents for follow up  prostate cancer on Lupron, zytiga. He had CT c/ap done on 8/3/22 for hip pain. It showed improvement from previous examination. Improved but persistent retroperitoneal and pelvic adenopathy. Extensive bony metastases.\par pt has pelvis pain, taking oxycodon q6hr with good relief. Pain with getting up.  Rib cage pain is improved. Hot flashes resolved after C1.  urinates ~4x/night, improved from ~hourly. Also straight caths 1-2x/day. He started working in his wood workshop.\par \par 10/4/22:  Patient presents for follow up and Q3 month Lupron injection for prostate cancer. He is also taking Zytiga and prednisone daily.\par + Late August left hip fracture s/p ORIF.  + Intermittent rib pain, increased with deep breath but decreased overall, pelvis/LBP also decreased overall and right posterior leg pain is worse recently but all pain managed well with oxycodone 10mg. + Occasional mild nocturnal LE neuropathy.  + Nocturia continues improving, urinates 3-4x/night. Also straight caths 1-2x/day. + CDiff positive during hospitalization for hip fracture. No current diarrhea. No recent hot flashes. No myalgia, flushing/diaphoresis, diarrhea, cough or dyspepsia.\par

## 2022-10-28 NOTE — RESULTS/DATA
[FreeTextEntry1] : 9/24/21: Bone scan: Pt had bone scan at the hospital that showed multifocal osseous mets in the axial and appendicular skeleton. Lesions in the left femoral neck/proximal femur, and bilateral proximal humeri may place the patient at risk for pathologic fractures.\par \par 9/22/21: Path 1  Femoral head. Bone and soft tissue, right hip fracture, arthroplasty\par - Femoral head with neck fracture\par - Pathologic fracture with METASTATIC ADENOCARCINOMA, granulation tissue\par \par 9/1/21: 1. Prostate, right and left apex, biopsy\par -Adenocarcinoma of the prostate, Prognostic Grade Group 5\par           (Grupo score 4+5=9) involving 100% (15 mm and 13 mm in length\par           respectively) of 2 of 2 core(s). [place] pattern 5 comprises 5%\par           of tumor.\par -Perineural invasion is identified\par \par           2. Prostate, right and left apex, biopsy\par -Adenocarcinoma of the prostate, Prognostic Grade Group 5\par           (Grupo score 4+5=9) involving 50%, 50%, and 80% (2 mm, 6 mm,\par           and 7 mm in length respectively) of 3 of 3 core(s). [place]\par           pattern 5 comprises 5% of tumor.\par -Perineural invasion is identified\par \par \par 8/4/21 CT ap IMPRESSION: Marked enlargement of the prostate suggestive of malignancy with evidence of extracapsular extension to the cul-de-sac and posterior bladder wall and right UVJ with resulting moderate right hydroureteronephrosis. Loculated fluid collection in the deep pelvis with circumferential rind of nodular tumor, with mass effect upon the distal rectum. Extensive retroperitoneal, para-aortic, and paracaval adenopathy. Extensive bony metastases.\par \par 8/4/21 MRI  : Large neoplasm encompassing nearly the entire prostate gland with gross extracapsular disease. Invasion into the base of the urinary bladder obstructing the right ureterovesical junction. Bilateral neurovascular bundle involvement. Bilateral seminal vesicle invasion with large cystic dilatation of the seminal vesicle secondary to obstruction with tumor nodularity along the wall.\par *PIRADS 5 - Very high (clinically significant cancer is highly likely to be present)\par \par Extensive pelvic lymphadenopathy and diffuse bone metastases.\par \par *Lesion descriptors and assessment categories are formulated utilizing PI-RADS v2.1.\par

## 2022-10-28 NOTE — ASSESSMENT
[FreeTextEntry1] : Mr. JACK UMANZOR is a 71 year old male with denovo metastatic prostate cancer diagnosed on 9/22 prostate bx that showed Adenocarcinoma of the prostate, Prognostic Grade Group 5 (Grupo score 4+5=9). Pt had a pathologic fracture of his right hip in September no s/p hip replacement on 9/22/21. Now recovering well. He was started on bicalutamide by urology. \par \par # denovo metastatic prostate cancer to the bones\par -Pt on bicalutamide started by urology - stopped after he runs out\par -continue Lupron +  abiraterone + prednisone for mCSPC\par -started prolia after dental clearance\par -vitamin D + calcium for bone health\par -Bone scan showed multifocal osseous mets in the axial and appendicular skeleton. Lesions in the left femoral neck/proximal femur, and bilateral proximal humeri may place the patient at risk for pathologic fractures.\par -pt has rib cage pain has mostly resolved, Xray showed left ant 8th rib age indeterminant fracture\par -MR spine on 1/19/22 Diffuse osseous metastatic disease. Includes heterogeneous signal abnormality throughout the sacral ala. Superimposed insufficiency fracture is not excluded. Multilevel spondylosis\par -CT c/ap done on 8/3/22 showed improvement from previous examination. Improved but persistent retroperitoneal and pelvic adenopathy. Extensive bony metastases.\par -PSA trending down\par -continue Lupron +  abiraterone + prednisone\par \par # Supportive\par -bone pain - prn oxycodone\par -constipation - colace\par \par # Hip fracture\par -continue PT as per Ortho

## 2022-11-14 PROBLEM — K40.90 INGUINAL HERNIA, RIGHT: Status: ACTIVE | Noted: 2022-01-01

## 2022-11-14 NOTE — PHYSICAL EXAM
[Normal] : normal rate, regular rhythm, normal S1 and S2 and no murmur heard [de-identified] : righ inguinal hernia

## 2022-11-30 PROBLEM — K59.00 CONSTIPATION: Status: ACTIVE | Noted: 2021-10-08

## 2022-11-30 PROBLEM — R10.31 RIGHT GROIN PAIN: Status: ACTIVE | Noted: 2022-01-01

## 2022-11-30 PROBLEM — R97.20 ELEVATED PSA: Status: ACTIVE | Noted: 2021-07-06

## 2022-11-30 PROBLEM — G89.3 CANCER ASSOCIATED PAIN: Status: ACTIVE | Noted: 2022-01-01

## 2022-11-30 PROBLEM — R19.09 RIGHT GROIN MASS: Status: ACTIVE | Noted: 2022-01-01

## 2022-11-30 PROBLEM — C61 PROSTATE CANCER METASTATIC TO BONE: Status: ACTIVE | Noted: 2021-09-29

## 2022-11-30 NOTE — CONSULT LETTER
[Dear  ___] : Dear ~ERIK, [Consult Letter:] : I had the pleasure of evaluating your patient, [unfilled]. [Please see my note below.] : Please see my note below. [Consult Closing:] : Thank you very much for allowing me to participate in the care of this patient.  If you have any questions, please do not hesitate to contact me. [Sincerely,] : Sincerely, [FreeTextEntry3] : Jersey Coyne MD, FACS\par  \par Department of Surgery\par [facility]\par [facility]\par

## 2022-11-30 NOTE — ASSESSMENT
[FreeTextEntry1] : The patient is an unfortunate 72 year old male with metastatic prostate cancer who present with right groin pain and a right groin mass.  The patient has no clinical or radiographic evidence of inguinal hernia. He has a mass in the right groin on exam and on CT imaging that is likely a large lymph node related to his prostate cancer.  The patient at this time is going to follow up with his oncologist to continue treatment for his prostate cancer. He will follow up here as needed from this point forward.  A total of 40 minutes was spent coordinating the patient's care.

## 2022-11-30 NOTE — PHYSICAL EXAM
[Purpura] : no purpura  [Petechiae] : no petechiae [Skin Ulcer] : no ulcer [Skin Induration] : no induration [Alert] : alert [Oriented to Person] : oriented to person [Oriented to Place] : oriented to place [Oriented to Time] : oriented to time [de-identified] : non toxic, in no acute distress [de-identified] : NC/AT PERRL EOMI no scleral icterus is noted  [de-identified] : trachea midline,  [de-identified] : no audible wheezing or stridor  [de-identified] : soft, non tender, no guarding, no rebound, no masses  [de-identified] : phallus normal, no testicular mass or tenderness  [de-identified] : there is no abdominal wall hernia, there is no inguinal hernia on the right or left side, there is a firm 2 by 3 cm mass in the area of the patient's pain that is likely a large lymph node as seen on CT imaging

## 2022-11-30 NOTE — HISTORY OF PRESENT ILLNESS
[de-identified] : Mr. JACK UMANZOR is a 71 year old male initially presented to urology in Oct 2020 for urinary frequency. He was treated for BPH. His PSA were checked in july 2021 and noted to be elevated to 1079. Further workup with MRI showed large neoplasm encompassing nearly the entire prostate gland with gross extracapsular disease. Invasion into the base of the urinary bladder obstructing the right ureterovesical junction. Bilateral neurovascular bundle involvement. Bilateral seminal vesicle invasion with large cystic  dilatation of the seminal vesicle secondary to obstruction with tumor nodularity along the wall. Extensive pelvic lymphadenopathy and diffuse bone metastases. Subsequent bx showed Adenocarcinoma of the prostate, Prognostic Grade Group 5 ([place] score 4+5=9). Pt had a pathologic fracture of his right hip in September no s/p hip replacement on 9/22/21. Now recovering well. He was started on bicalutamide by urology.\par \par Pt had bone scan at the hospital that showed multifocal osseous mets in the axial and appendicular skeleton. Lesions in the left femoral neck/proximal femur, and bilateral proximal humeri may place the patient at risk for pathologic fractures.\par \par He is here to discuss further management of metastatic prostate cancer. Prior to this, his functional status has been excellent. Apart from BPH, he does not have any other comorbid conditions. He lives alone and has a wood workshop. His brother and sisters are nearby on the same street.\par \par PSA\par 7/2/21 - 921\par 7/20/21 - 1079 \par \par \par \par \par  [de-identified] : 11/4/21: Pt is tolerating abiraterone and lupron. Denies hot flashes. Urinary frequency has improved. He gets up about 4 times per night.  self cath at night for urination. He has right hip pain so he is not as active and uses a cane. Denies HA. CP, SOB, abd pain, constipation, diarrhea, melena, hematuria, dysuria. \par \par 12/23/21: pt has pain in the whole rib cage.  . CXR showed . Age indeterminate possibly pathologic anterior left 8th rib fracture deformity near costochondral junction. Multifocal predominantly osteosclerotic however heterogeneous mixed density osseous metastatic disease.  On oxycodone prn.  Started having hot flashes which are tolerable. Occasional  self cath for urination. He would lke to start doing Wood work, advised not to lift too heavy of a weight. \par \par 1/27/22. Pt had acute lower back pain with radiculopathy recently and went to see ortho. He had MR spine on 1/19/22 Diffuse osseous metastatic disease. Includes heterogeneous signal abnormality throughout the sacral ala. Superimposed insufficiency fracture is not excluded. Multilevel spondylosis. He is taking pain meds with good relief. Rib cage pain mostly resolved. He started working the past few weeks and it made him feels good. Denies HA. CP, SOB, abd pain, constipation, diarrhea, melena, hematuria, dysuria. \par \par 4/4/22: Patient presents for follow up and 3rd Q3 month Lupron injection for prostate cancer. He is also taking Zytiga and prednisone daily.\par + Intermittent rib pain, spine pain and right hip pain. + Occasional LE edema, ankles. + Hot flashes resolved, only occurred after C1. + Nocturia improved, urinates ~4x/night, improved from ~hourly. Also straight caths 1-2x/day. No myalgia, flushing/diaphoresis, diarrhea, cough or dyspepsia.\par \par 7/6/22: Patient presents for follow up and 3rd Q3 month Lupron injection for prostate cancer. He is also taking Zytiga and prednisone daily.\par pt has pelvis pain, taking oxycodon q6hr with good relief. Pain with getting up.  Rib cage pain is improved. Hot flashes resolved after C1.  urinates ~4x/night, improved from ~hourly. Also straight caths 1-2x/day. He started working in his wood workshop.\par \par 8/5/22: Patient presents for follow up  prostate cancer on Lupron, zytiga. He had CT c/ap done on 8/3/22 for hip pain. It showed improvement from previous examination. Improved but persistent retroperitoneal and pelvic adenopathy. Extensive bony metastases.\par pt has pelvis pain, taking oxycodon q6hr with good relief. Pain with getting up.  Rib cage pain is improved. Hot flashes resolved after C1.  urinates ~4x/night, improved from ~hourly. Also straight caths 1-2x/day. He started working in his wood workshop.\par \par 10/4/22:  Patient presents for follow up and Q3 month Lupron injection for prostate cancer. He is also taking Zytiga and prednisone daily.\par + Late August left hip fracture s/p ORIF.  + Intermittent rib pain, increased with deep breath but decreased overall, pelvis/LBP also decreased overall and right posterior leg pain is worse recently but all pain managed well with oxycodone 10mg. + Occasional mild nocturnal LE neuropathy.  + Nocturia continues improving, urinates 3-4x/night. Also straight caths 1-2x/day. + CDiff positive during hospitalization for hip fracture. No current diarrhea. No recent hot flashes. No myalgia, flushing/diaphoresis, diarrhea, cough or dyspepsia.\par \par 11/30/22:  Patient presents for follow up on Q3 month Lupron injection for prostate cancer. He is also taking Zytiga and prednisone daily.\par + R shoulder pain. + Right groin pain likely due to lymphadenopathy. + s/p late August left hip fracture s/p ORIF.   + Intermittent rib pain, increased with deep breath, pelvis/LBP and right posterior leg pain all worse since receiving Xgeva but all pain managed relatively well with oxycodone 10mg. + Mild hot flashes. + Occasional mild nocturnal LE neuropathy.  + Nocturia unchanged, still urinates 3-4x/night and straight caths 1-2x/day.  No current diarrhea. No myalgia, flushing/diaphoresis, diarrhea, cough or dyspepsia.

## 2022-11-30 NOTE — ASSESSMENT
[FreeTextEntry1] : Mr. JACK UMANZOR is a 71 year old male with denovo metastatic prostate cancer diagnosed on 9/22 prostate bx that showed Adenocarcinoma of the prostate, Prognostic Grade Group 5 (Grupo score 4+5=9). Pt had a pathologic fracture of his right hip in September no s/p hip replacement on 9/22/21. Now recovering well. He was started on bicalutamide by urology. \par \par # denovo metastatic prostate cancer to the bones\par -s/p bicalutamide started by urology \par -now on Lupron +  abiraterone + prednisone for mCSPC\par -started prolia after dental clearance\par -vitamin D + calcium for bone health\par -Bone scan showed multifocal osseous mets in the axial and appendicular skeleton. Lesions in the left femoral neck/proximal femur, and bilateral proximal humeri may place the patient at risk for pathologic fractures.\par -pt has rib cage pain has mostly resolved, Xray showed left ant 8th rib age indeterminant fracture\par -MR spine on 1/19/22 Diffuse osseous metastatic disease. Includes heterogeneous signal abnormality throughout the sacral ala. Superimposed insufficiency fracture is not excluded. Multilevel spondylosis\par -CT c/ap done on 8/3/22 showed improvement from previous examination. Improved but persistent retroperitoneal and pelvic adenopathy. Extensive bony metastases.\par -PSA was trending down but increased recently\par -will get xray of right shoulder for increased pain and decreased ROM\par -will get restaging CT CAP the week of 12/19/22\par -continue Lupron +  abiraterone + prednisone for now\par -Dr. Pena follow up in late December to review scans and discuss further plan \par \par # Supportive\par -bone pain - prn oxycodone.  Will consider starting OxyContin or fentanyl patch \par -constipation - colace\par -advised to follow up with Urology due to continued urgency even after straight cath and urinating

## 2022-11-30 NOTE — DATA REVIEWED
[FreeTextEntry1] : independent review of the abd/pel CT scan from 8/2022 reveals a lymphadenopathy of the right pelvic area and a large prostate mass with involvement of the right bladder, no inguinal hernia is seen

## 2022-11-30 NOTE — HISTORY OF PRESENT ILLNESS
[de-identified] : The patient comes to the office in consultation by Rocael Luz NP for evaluation of a mass in the right groin with associated with right groin pain.  The patient reports that he started to have pain in the right groin with standing and activity over the past 2 months. He has recently noted the development of a firm mass in the area of the pain.  He has no mass in the left groin.  He denies any recent trauma to the area. He also has pain in the right shoulder and upper back.

## 2022-11-30 NOTE — RESULTS/DATA
[FreeTextEntry1] : 9/24/21: Bone scan: Pt had bone scan at the hospital that showed multifocal osseous mets in the axial and appendicular skeleton. Lesions in the left femoral neck/proximal femur, and bilateral proximal humeri may place the patient at risk for pathologic fractures.\par \par 9/22/21: Path 1  Femoral head. Bone and soft tissue, right hip fracture, arthroplasty\par - Femoral head with neck fracture\par - Pathologic fracture with METASTATIC ADENOCARCINOMA, granulation tissue\par \par 9/1/21: 1. Prostate, right and left apex, biopsy\par -Adenocarcinoma of the prostate, Prognostic Grade Group 5\par           (Gr[place] score 4+5=9) involving 100% (15 mm and 13 mm in length\par           respectively) of 2 of 2 core(s). [place] pattern 5 comprises 5%\par           of tumor.\par -Perineural invasion is identified\par \par           2. Prostate, right and left apex, biopsy\par -Adenocarcinoma of the prostate, Prognostic Grade Group 5\par           (Grupo score 4+5=9) involving 50%, 50%, and 80% (2 mm, 6 mm,\par           and 7 mm in length respectively) of 3 of 3 core(s). [place]\par           pattern 5 comprises 5% of tumor.\par -Perineural invasion is identified\par \par \par 8/4/21 CT ap IMPRESSION: Marked enlargement of the prostate suggestive of malignancy with evidence of extracapsular extension to the cul-de-sac and posterior bladder wall and right UVJ with resulting moderate right hydroureteronephrosis. Loculated fluid collection in the deep pelvis with circumferential rind of nodular tumor, with mass effect upon the distal rectum. Extensive retroperitoneal, para-aortic, and paracaval adenopathy. Extensive bony metastases.\par \par 8/4/21 MRI  : Large neoplasm encompassing nearly the entire prostate gland with gross extracapsular disease. Invasion into the base of the urinary bladder obstructing the right ureterovesical junction. Bilateral neurovascular bundle involvement. Bilateral seminal vesicle invasion with large cystic dilatation of the seminal vesicle secondary to obstruction with tumor nodularity along the wall.\par *PIRADS 5 - Very high (clinically significant cancer is highly likely to be present)\par \par Extensive pelvic lymphadenopathy and diffuse bone metastases.\par \par *Lesion descriptors and assessment categories are formulated utilizing PI-RADS v2.1.\par

## 2022-12-07 NOTE — H&P ADULT - HISTORY OF PRESENT ILLNESS
72M with a history of metastatic prostate cancer who presented with right groin pain and mass. The patient reported that he had first noted a small bump in the right groin about two weeks prior which began to grow in size. He was referred to a surgeon for possible hernia but it was deemed not to be a hernia. The bump continued to increase in size and was uncomfortable. He was advised to present to the hospital for further evaluation by his oncologist. The patient also reported a chronic non-productive cough over the past month. The patient denied any associated fevers or chills. He denied any recent sick contacts. CT of the chest was notable for metastatic disease and the patient was thought to benefit from biopsy for further diagnosis. Interventional Radiology was contacted by the Emergency department physician and advised that he would have to coordinate with Oncology. There were no recent fevers, chills, or sick contacts.

## 2022-12-07 NOTE — ED ADULT TRIAGE NOTE - CHIEF COMPLAINT QUOTE
Pt with lump in his groin for approx 2 months and was sent in by Dr Pena for admission. Pt is also being treated for prostate ca.

## 2022-12-07 NOTE — H&P ADULT - NSHPPHYSICALEXAM_GEN_ALL_CORE
Vital Signs Last 24 Hrs  T(C): 36.9 (07 Dec 2022 15:24), Max: 36.9 (07 Dec 2022 15:24)  T(F): 98.4 (07 Dec 2022 15:24), Max: 98.4 (07 Dec 2022 15:24)  HR: 79 (07 Dec 2022 15:24) (79 - 118)  BP: 166/82 (07 Dec 2022 15:24) (124/91 - 166/82)  BP(mean): --  RR: 16 (07 Dec 2022 15:24) (16 - 16)  SpO2: 93% (07 Dec 2022 15:24) (93% - 93%)    Parameters below as of 07 Dec 2022 15:24  Patient On (Oxygen Delivery Method): room air    General appearance: No acute distress, Awake, Alert  HEENT: Normocephalic, Atraumatic, Conjunctiva clear, EOMI  Neck: Supple, No JVD, No tenderness  Lungs: Breath sound equal bilaterally, No wheezes, No rales  Cardiovascular: S1S2, Regular rhythm  Abdomen: Soft, Nontender, Nondistended, No guarding/rebound, Positive bowel sounds  Extremities: No clubbing, No cyanosis, No edema, No calf tenderness, Right groin mass  Neuro: Strength equal bilaterally, No tremors  Psychiatric: Appropriate mood, Normal affect

## 2022-12-07 NOTE — ED PROVIDER NOTE - CLINICAL SUMMARY MEDICAL DECISION MAKING FREE TEXT BOX
72-year-old male presenting for evaluation of increasing sized mass of the right inguinal area as well as 1 month of cough and shortness of breath.  Was told by his oncology team to come in for evaluation/get admitted for rehydration, biopsy, and further medical optimization.  On examination here is tachycardic, written for 500 cc bolus emergently, will send labs, 3 procedural screening tests including PT/INR and PTT.  Follow-up studies reassess and dispo.

## 2022-12-07 NOTE — ED PROVIDER NOTE - OBJECTIVE STATEMENT
72-year-old male history of probable prostate cancer currently on oral chemotherapeutic presenting for evaluation of right inguinal lymph node swelling.  Patient was told by his oncologist Dr. Pena that he needed to come in today for evaluation, was recommended that he be admitted for IV hydration, biopsy of right inguinal mass, as well as further work-up progressive fatigue and cough that has been present for 1 month.  Patient denies any fevers, chills, does endorse early onset of exercise fatigue.  He is on oral chemotherapeutics though forgets the name of it.  Denies any pain with urination, no blood in his urine, no blood in his stools, no dark or tarry stools, denies other symptoms of concern including chest pain or back pain.  Does intermittently feel short of breath when he is active.  No leg swelling, no history of DVT.

## 2022-12-07 NOTE — ED ADULT NURSE REASSESSMENT NOTE - NS ED NURSE REASSESS COMMENT FT1
Assumed care 1915, pt a&ox4, pt awaiting admission, assisted pt to bathroom, respirations even and unlabored, no distress noted.

## 2022-12-07 NOTE — H&P ADULT - NSHPSOCIALHISTORY_GEN_ALL_CORE
Denied tobacco, alcohol, or illicit    Lives at home by himself    Additional PSH: Bilateral hip surgery    Family History: Father without prostate cancer

## 2022-12-07 NOTE — ED ADULT NURSE REASSESSMENT NOTE - NS ED NURSE REASSESS COMMENT FT1
Gave report to Kym in CDU, pt a&ox3, on room air, VSS, no distress noted, tele-monitor placed prior to transport.

## 2022-12-07 NOTE — H&P ADULT - ASSESSMENT
72M with a history of metastatic prostate cancer who was referred to the hospital by his oncologist for a right groin mass found to have innumerable bilateral pulmonary nodules on CT of the chest.    Prostate cancer - The patient was noted to have metastatic disease to the bones. CT of the chest is now notable for pulmonary nodules which have increased in size in comparison to a prior CT from one month prior. The patient was previously diagnosed with adenocarcinoma and was undergoing treatment. For coordination between Oncology and Interventional Radiology in regards to biopsy. To continue on tamsulosin and finasteride.    Elevated D-dimer - CT angiogram was without pulmonary emboli. For enoxaparin tonight as prophylaxis for venous thromboembolism. Further anticoagulation pending decision in regards to biopsy.    Anemia - Suspect secondary to underlying malignancy.

## 2022-12-07 NOTE — ED ADULT NURSE NOTE - OBJECTIVE STATEMENT
patient complaining of right groin pain for some time as claimed. was sent here by his pcp for admission and eval.

## 2022-12-08 NOTE — PATIENT PROFILE ADULT - FALL HARM RISK - UNIVERSAL INTERVENTIONS
Bed in lowest position, wheels locked, appropriate side rails in place/Call bell, personal items and telephone in reach/Instruct patient to call for assistance before getting out of bed or chair/Non-slip footwear when patient is out of bed/[place] to call system/Physically safe environment - no spills, clutter or unnecessary equipment/Purposeful Proactive Rounding/Room/bathroom lighting operational, light cord in reach

## 2022-12-08 NOTE — PROGRESS NOTE ADULT - ASSESSMENT
72 yr old male with metastatic prostate cancer presented for evaluation of right groin mass he noted about few weeks ago. Also has had a cough for over 2 months. CTA chest was done on admission, pulmonary embolism was ruled out but revealed multiple pulmonary nodules. Oncology consulted, advised IR evaluation for biopsy.     1. Right groin mass:  IR eval for biopsy     2. Metastatic prostate cancer;  Continue Prednisone and  abiraterone  continue Finasteride and Flomax  pain control  palliative care eval for pain management     3. Pulmonary nodules:  Likely sec metastatic disease.    4. DVT ppx:  Lovenox today    Discussed with patient and RN.  72 yr old male with metastatic prostate cancer presented for evaluation of right groin mass he noted about few weeks ago. Also has had a cough for over 2 months. CTA chest was done on admission, pulmonary embolism was ruled out but revealed multiple pulmonary nodules. Oncology consulted, advised IR evaluation for biopsy.     1. Right groin mass:  IR eval for biopsy     2. Metastatic prostate cancer;  Continue Prednisone and  abiraterone  continue Finasteride and Flomax  pain control  palliative care eval for pain management     3. Pulmonary nodules:  Likely sec metastatic disease.    4. DVT ppx:  Lovenox today    5. Hypokalemia:  Replace potassium.    Discussed with patient and RN.

## 2022-12-08 NOTE — CONSULT NOTE ADULT - ASSESSMENT
71 year old male with metastatic prostate cancer ( [facility])  diagnosed on 9/22 prostate bx that showed Adenocarcinoma of the prostate, Prognostic Grade Group 5 ([place] score 4+5=9), on Lupron abiraterone and Prednisone    PSA was > 1000 in Sept 2021, and decline to 12 in Aug 2022, slight increase to 15 in NOv 2022   Howvere recently patient not feeling well, very fatigue. + night sweats. Noticed enlarging lower abdominal LN.     CT on 12/1/22 ( compared to Aug 2022)  showed Innumerable bilateral pulmonary nodules have developed since 8/1/2022, suspicious for metastatic disease.   New retroperitoneal adenopathy, with necrotic aortocaval 3.3 x 3.2 cm node, Enlarging pelvic adenopathy with : Enlarging, 9.5 x 7.7 cm right obturator node,  previously 2.5 x 1.9 cm. Right common iliac 2.1 x 1.5 cm node  previously 1.3 x 1.5 cm. Bulky right inguinal adenopathy measures 4.5 x 4.2 cm  previously 2.2 x 1.8 cm.   Diffuse bone metastases again noted.    The small increase in PSA does not explain the significantly enlarging LADs. Concerning for transformation vs secondary malignancy.    INPROGRESS 71 year old male with metastatic prostate cancer ( [facility])  diagnosed on 9/22 prostate bx that showed Adenocarcinoma of the prostate, Prognostic Grade Group 5 ([place] score 4+5=9), on Lupron abiraterone and Prednisone    PSA was > 1000 in Sept 2021, and decline to 12 in Aug 2022, slight increase to 15 in NOv 2022   Howvere recently patient not feeling well, very fatigue. + night sweats. Noticed enlarging lower abdominal LN.     CT on 12/1/22 ( compared to Aug 2022)  showed Innumerable bilateral pulmonary nodules have developed since 8/1/2022, suspicious for metastatic disease.   New retroperitoneal adenopathy, with necrotic aortocaval 3.3 x 3.2 cm node, Enlarging pelvic adenopathy with : Enlarging, 9.5 x 7.7 cm right obturator node,  previously 2.5 x 1.9 cm. Right common iliac 2.1 x 1.5 cm node  previously 1.3 x 1.5 cm. Bulky right inguinal adenopathy measures 4.5 x 4.2 cm  previously 2.2 x 1.8 cm.   Diffuse bone metastases again noted.    # metastatic Prostate Cancer   # Worsening RP Adenopathy   # worsening lung mets     -  small increase in PSA does not explain the significantly enlarging LADs  Concerning for transformation to small cell carcinoma prostate vs secondary malignancy.  - Will require Biopsy of enlarging Lymph node  - consult IR for Biopsy   - Continue abiratarone 1000 mg daily  and Prednisone 5 mg while in hospital. Pharmacy does not carry medication, patient to provide own medication

## 2022-12-09 NOTE — CONSULT NOTE ADULT - SUBJECTIVE AND OBJECTIVE BOX
This is a 73 yo M PMH of Prostate Cancer, on oral chemotherapeutic agent daily-treated at [facility] by Dr. Pena. before coming into hospital. Planning a biopsy of R groin lymph node enlargement-painful, making it difficult to ambulate. Multiple Lung nodules seen on XR associated with dry cough. Coughing less with present antitussive medication. Associated with anterior chest and back pain. He is breathing well on room air saturating 90-94%. He admits to being constipated; finally able to pass "hard stool" this morning, also getting up every two hours to urinate. He is having second thoughts about how much intervention he can be offered by Heme-Onc after biopsy results are back. He cannot "live this way. No fever or chills  He is opioid tolerant and dependent taking between 40 and 60mg of Oxy IR/d repositioning increasing activity, walking increases pain. Shoulder pain associated with intermittent numbness and tingling. No SOB,   HPI:  72M with a history of metastatic prostate cancer who presented with right groin pain and mass. The patient reported that he had first noted a small bump in the right groin about two weeks prior which began to grow in size. He was referred to a surgeon for possible hernia but it was deemed not to be a hernia. The bump continued to increase in size and was uncomfortable. He was advised to present to the hospital for further evaluation by his oncologist. The patient also reported a chronic non-productive cough over the past month. The patient denied any associated fevers or chills. He denied any recent sick contacts. CT of the chest was notable for metastatic disease and the patient was thought to benefit from biopsy for further diagnosis. Interventional Radiology was contacted by the Emergency department physician and advised that he would have to coordinate with Oncology. There were no recent fevers, chills, or sick contacts. (07 Dec 2022 17:52)      PERTINENT PMH REVIEWED: Yes     PAST MEDICAL & SURGICAL HISTORY:  Prostate cancer    S/P appendectomy    SOCIAL HISTORY:                      Substance history: none                    Admitted from:  home                      Samaritan/spirituality:vNo Episcopalian affinity                    Cultural concerns: none                      HCP- Donny Florence-  Phone#: [phone]    FAMILY HISTORY:    No Known Allergies    Intolerances    ADVANCE DIRECTIVES/TREATMENT PREFERENCES:  Full code, all aggressive measures desired      Baseline ADLs (prior to admission):  Independent/       Karnofsky/Palliative Performance Status Version 40:  %    Present Symptoms:     Dyspnea: Mild coughing  Nausea/Vomiting:  No  Anxiety:  Yes   Depression:  No  Fatigue: Yes   Loss of appetite:  No  Constipation: yes    Pain: R shoulder R rib cage            Character-            Duration-            Effect-            Factors-exacerbates with coughing            Frequency-constant with exacerbations            Location-anterior chest and R groin   and above            Severity-moderate to severe    Review of Systems: Reviewed as above                       All others negative    MEDICATIONS  (STANDING):  abiraterone 1000 milliGRAM(s) Oral daily  acetaminophen     Tablet .. 650 milliGRAM(s) Oral every 8 hours  finasteride 5 milliGRAM(s) Oral daily  lidocaine   4% Patch 2 Patch Transdermal daily  oxyCODONE  ER Tablet 15 milliGRAM(s) Oral every 12 hours  potassium phosphate / sodium phosphate Tablet (K-PHOS No. 2) 1 Tablet(s) Oral four times a day  predniSONE   Tablet 5 milliGRAM(s) Oral daily  senna 2 Tablet(s) Oral at bedtime  tamsulosin 0.8 milliGRAM(s) Oral at bedtime    MEDICATIONS  (PRN):  benzonatate 200 milliGRAM(s) Oral every 8 hours PRN Cough  bisacodyl 5 milliGRAM(s) Oral every 12 hours PRN Constipation  guaifenesin/dextromethorphan Oral Liquid 10 milliLiter(s) Oral every 6 hours PRN Cough  oxyCODONE    IR 10 milliGRAM(s) Oral every 4 hours PRN Severe Pain (7 - 10)    PHYSICAL EXAM:    Vital Signs Last 24 Hrs  T(C): 36.6 (09 Dec 2022 16:27), Max: 36.7 (09 Dec 2022 11:11)  T(F): 97.9 (09 Dec 2022 16:27), Max: 98 (09 Dec 2022 11:11)  HR: 85 (09 Dec 2022 16:27) (84 - 90)  BP: 152/73 (09 Dec 2022 16:27) (152/73 - 179/90)  BP(mean): --  RR: 18 (09 Dec 2022 16:27) (18 - 18)  SpO2: 91% (09 Dec 2022 16:27) (91% - 94%)    Parameters below as of 09 Dec 2022 11:11  Patient On (Oxygen Delivery Method): room air    General: alert  oriented x __4__             well nourished verbal      HEENT:  dry mouth and cough-non productive    Lungs: comfortable SOB on exertion ambulating    CV: normal    GI:  distended            constipation  last BM: yes this morning    : voiding frequently especially nocturnally    MSK:  weakness              ambulatory      Neuro: no focal deficits     Skin: R groin_  no rash    LABS:                        11.0   9.42  )-----------( 392      ( 09 Dec 2022 05:52 )             32.7     12-09    138  |  103  |  15.4  ----------------------------<  89  3.3<L>   |  20.0<L>  |  0.60    Ca    8.4      09 Dec 2022 05:52  Phos  0.7     12-09  Mg     2.1     12-09    PT/INR - ( 08 Dec 2022 06:25 )   PT: 15.4 sec;   INR: 1.32 ratio        I&O's Summary    RADIOLOGY & ADDITIONAL STUDIES:  < from: CT Angio Chest PE Protocol w/ IV Cont (12.07.22 @ 14:40) >    Mediastinum and Heart: Aorta and pulmonary arteries are normal in size.   Thyroid gland is unremarkable. No lymphadenopathy. No pericardial   effusion.    Lungs, Pleura, and Airways: There is no pulmonary embolus. Innumerable   bilateral pulmonary nodules which have increased in size and number since   12/1/2022.    Visualized Abdomen: Unremarkable.    Bones and soft tissues: Unchanged diffuse bony metastatic disease.    IMPRESSION:    No pulmonary embolus.    Innumerable bilateral pulmonary nodules which have increased in size and   number since 12/1/2022.    < end of copied text >      
REASON FOR CONSULTATION:     HPI:  71 year old male with metastatic prostate cancer ( [facility])  diagnosed on  prostate bx that showed Adenocarcinoma of the prostate, Prognostic Grade Group 5 ([place] score 4+5=9),  pathologic fracture of his right hip in 2021 on Lupron abiraterone and Prednisone  PSA was > 1000 in 2021, and decline to 12 in Aug 2022, slight increase to 15 in 2022   Howvere recently patient not feeling well, very fatigue. + night sweats. Noticed enlarging lower abdominal LN.     CT on 22 showed Innumerable bilateral pulmonary nodules have developed since 2022, suspicious for metastatic disease.   New retroperitoneal adenopathy, with necrotic aortocaval 3.3 x 3.2 cm node, Enlarging pelvic adenopathy with : Enlarging, 9.5 x 7.7 cm right obturator node,  previously 2.5 x 1.9 cm. Right common iliac 2.1 x 1.5 cm node  previously 1.3 x 1.5 cm. Bulky right inguinal adenopathy measures 4.5 x 4.2 cm  previously 2.2 x 1.8 cm.   Diffuse bone metastases again noted.    REVIEW OF SYSTEMS:  Constitutional, Eyes, ENT, Cardiovascular, Respiratory, Gastrointestinal, Genitourinary, Musculoskeletal, Integumentary, Neurological, Psychiatric, Endocrine, Heme/Lymph, and Allergic/Immunologic review of systems are otherwise negative except as noted in the HPI.    PAST MEDICAL & SURGICAL HISTORY:  Prostate cancer      S/P appendectomy          FAMILY HISTORY:      SOCIAL HISTORY:    Allergies    No Known Allergies    Intolerances        MEDICATIONS  (STANDING):  finasteride 5 milliGRAM(s) Oral daily  potassium chloride    Tablet ER 40 milliEquivalent(s) Oral every 4 hours  predniSONE   Tablet 5 milliGRAM(s) Oral daily  tamsulosin 0.8 milliGRAM(s) Oral at bedtime    MEDICATIONS  (PRN):  acetaminophen     Tablet .. 650 milliGRAM(s) Oral every 6 hours PRN Temp greater or equal to 38C (100.4F), Mild Pain (1 - 3)  benzonatate 100 milliGRAM(s) Oral every 8 hours PRN Cough  oxyCODONE    IR 10 milliGRAM(s) Oral every 4 hours PRN Severe Pain (7 - 10)      Vital Signs Last 24 Hrs  T(C): 36.6 (08 Dec 2022 02:00), Max: 36.9 (07 Dec 2022 15:24)  T(F): 97.8 (08 Dec 2022 02:00), Max: 98.4 (07 Dec 2022 15:24)  HR: 90 (08 Dec 2022 02:00) (79 - 96)  BP: 170/68 (08 Dec 2022 02:00) (138/89 - 170/68)  BP(mean): --  RR: 18 (08 Dec 2022 02:00) (16 - 18)  SpO2: 93% (08 Dec 2022 02:00) (92% - 93%)    Parameters below as of 07 Dec 2022 20:22  Patient On (Oxygen Delivery Method): room air        PHYSICAL EXAM:    GENERAL: NAD, well-groomed, well-developed  HEAD:  Atraumatic, Normocephalic  EYES: EOMI, PERRLA, conjunctiva and sclera clear  ENMT: No tonsillar erythema, exudates, or enlargement; Moist mucous membranes, Good dentition, No lesions  NECK: Supple, No JVD, Normal thyroid  NERVOUS SYSTEM:  Alert & Oriented X3, Good concentration; Motor Strength 5/5 B/L upper and lower extremities; DTRs 2+ intact and symmetric  CHEST/LUNG: Clear to auscultation bilaterally; No rales, rhonchi, wheezing, or rubs  HEART: Regular rate and rhythm; No murmurs, rubs, or gallops  ABDOMEN: Soft, Nontender, Nondistended; Bowel sounds present  EXTREMITIES:  2+ Peripheral Pulses, No clubbing, cyanosis, or edema  LYMPH: No lymphadenopathy noted  SKIN: No rashes or lesions      LABS:                        10.4   8.39  )-----------( 369      ( 08 Dec 2022 06:25 )             30.6     12-08    138  |  103  |  18.0  ----------------------------<  95  3.1<L>   |  22.0  |  0.68    Ca    8.1<L>      08 Dec 2022 06:25  Mg     2.3     12-    TPro  6.8  /  Alb  3.7  /  TBili  0.4  /  DBili  x   /  AST  35  /  ALT  11  /  AlkPhos  132<H>  12-07    PT/INR - ( 08 Dec 2022 06:25 )   PT: 15.4 sec;   INR: 1.32 ratio         PTT - ( 07 Dec 2022 11:55 )  PTT:28.3 sec  Urinalysis Basic - ( 07 Dec 2022 12:45 )    Color: Yellow / Appearance: Slightly Turbid / S.020 / pH: x  Gluc: x / Ketone: Small  / Bili: Negative / Urobili: Negative mg/dL   Blood: x / Protein: 30 mg/dL / Nitrite: Negative   Leuk Esterase: Trace / RBC: 3-5 /HPF / WBC 0-2 /HPF   Sq Epi: x / Non Sq Epi: Occasional / Bacteria: Few          RADIOLOGY & ADDITIONAL STUDIES:    PATHOLOGY:

## 2022-12-09 NOTE — PROGRESS NOTE ADULT - ASSESSMENT
72 yr old male with metastatic prostate cancer presented for evaluation of right groin mass he noted about few weeks ago. Also has had a cough for over 2 months. CTA chest was done on admission, pulmonary embolism was ruled out but revealed multiple pulmonary nodules. Oncology consulted, advised IR evaluation for biopsy.     1. Right groin mass:  IR eval for biopsy   plan for later today.     2. Metastatic prostate cancer;  Continue Prednisone and  abiraterone  continue Finasteride and Flomax  pain control  palliative care eval for pain management       3. Pulmonary nodules:  Likely sec metastatic disease.  eval for home oxygen  anti tussives    4. DVT ppx:  Lovenox today    5. Hypokalemia and hypophosphatemia  Replace potassium and phosphorus  monitor BMP      Discussed with patient and RN.

## 2022-12-09 NOTE — CONSULT NOTE ADULT - ASSESSMENT
71 yo M PMH of Prostate Cancer, currently being treated by Oncologist Dr. Pena, on oral chemotherapy. Oncologist also prescribing his pain medications. He has become Opioid dependent and Tolerant. Pain has been worsening past week or two. Found to have Metastatic Lung cancer, and is S/P R groin Bx to identify future treatment strategy    Prostate Cancer -newly discovered: has Metastasized to Lung  S/P R groin enlarged lymph node biopsy earlier today  Currently on oral chemotherapy  Experiencing Ruthy metastasis pain,  having difficulty ambulating  Phosphorus Low at 0.7  D-Dimer > to 492    Acute on Chronic Cancer Pain  Diagnosed Sept '21  Diffuse bone mets, and pleuritic pain with coughing  Oncology treating his pain with Oxycodone IR 10mg 4 6x/d  In his case Oxycontin ER 15mg PO Q12 hrs for more consistent pain relief  leaving shr=ort acting opioid Oxycodone IR 10 mg for BTP Q4h prn  I discussed then ordered long acting opioid.  He has become opioid tolerant and dependent  Added Tylenol 650 PO Q8h intervals  Lidocaine 4% patches x 2 for R shoulder and R lateral rib cage pain    Opioid Induced Constipation  On Senna 2 tabs and stool softener at home-   Senna without stool softener available here   Dulcolax 5 mg PO Q12h prn constipation  Relistor 12 mg every other day for constipation as needed  Fluids encouraged    Nagging Dry cough  "It has been worse, before coming into the hospital"   Tessalon 200 mg PO Q8h  B/L Pulmonary nodules and enlarged lymph nodes-increased in size and number since 12/1  Rapidly growing cancer    GO  Better pain relief  Wait and see results of Lymph node biopsy  F/U with Oncologist to discuss treatment options if there are any offered  Patient has brother Donny as his HCP-has a living will. His brother knows what his end of life wishes are, and will carry them out.  Will ask brother Donny to bring in whatever documents he has regarding these Directives to copy for our chart

## 2022-12-09 NOTE — CONSULT NOTE ADULT - NSCONSULTADDITIONALINFOA_GEN_ALL_CORE
Total Time Spent__40__ minutes  This includes chart review, patient assessment, discussion and collaboration with interdisciplinary team members, ACP planning    COUNSELING:  Face to face meeting to discuss Advanced Care Planning - Time Spent __10____Minutes.      Thank you for the opportunity to assist with the care of this patient.   [facility] Palliative Medicine Consult Service [phone].

## 2022-12-10 NOTE — PROGRESS NOTE ADULT - ASSESSMENT
71 y/o M w/ PMH of metastatic prostate CA, chronic retention (intermittently self straight caths) presented for evaluation of rt groin mass he first noted a few weeks ago.  Pt also reported new nonproductive cough for past 2 months.  CTA chest was done on admission, pulmonary embolism was ruled out but revealed multiple pulmonary nodules.  Oncology consulted, advised IR evaluation for biopsy of groin mass which was done 12/09.         Right groin mass   - concern for small cell carcinoma prostate transformation vs secondary malignancy  - s/p IR guided biopsy 12/09; f/u path results   - Oncology following and recs noted       Metastatic prostate cancer / chronic urinary retention   - c/w Prednisone and abiraterone  - c/w Finasteride and Flomax  - Bladder scan q6h and straight cath for >400cc  - Analgesics as needed   - Ucx prelim growing Enterococcus however pt asymptomatic, no leukocytosis, afebrile; no indication to treat at this time but will monitor cbc and vitals  - Palliative care eval for pain management       Pulmonary nodules  - Likely sec metastatic disease vs secondary malignancy   - Anti tussives as needed  - CT chest reviewed and noted above   - Will need repeat CT chest in 3 months  - Oncology following and recs noted       Hypokalemia and hypophosphatemia  - Pending AM labs to reassess   - Replete to maintain K>4 and phos>3        VTE ppx: lovenox SQ      Dispo: Pt remains acute requiring inpt hospitalization.

## 2022-12-11 NOTE — PROGRESS NOTE ADULT - ASSESSMENT
73 y/o M w/ PMH of metastatic prostate CA, chronic retention (intermittently self straight caths) presented for evaluation of rt groin mass he first noted a few weeks ago.  Pt also reported new nonproductive cough for past 2 months.  CTA chest was done on admission, pulmonary embolism was ruled out but revealed multiple pulmonary nodules.  Oncology consulted, advised IR evaluation for biopsy of groin mass which was done 12/09.         Right groin mass   - Concern for small cell carcinoma prostate transformation vs secondary malignancy  - s/p IR guided biopsy 12/09; f/u path results   - Oncology following and recs noted       Metastatic prostate cancer / chronic urinary retention   - c/w Prednisone and abiraterone  - c/w Finasteride and Flomax  - Bladder scans have been without retention   - Analgesics as needed   - Ucx prelim growing Enterococcus however pt asymptomatic, no leukocytosis, afebrile; no indication to treat at this time but will monitor cbc and vitals  - Palliative care eval for pain management       Pulmonary nodules  - Likely sec metastatic disease vs secondary malignancy   - Anti tussives as needed  - CT chest reviewed and noted above   - Will need repeat CT chest in 3 months  - Oncology following and recs noted   - Will refer to pulm on discharge       Hypokalemia and hypophosphatemia  - Low again this morning and trending down   - Will supplement  - Replete to maintain K>4 and phos>3        VTE ppx: lovenox SQ      Dispo: Pt remains acute requiring inpt hospitalization.     73 y/o M w/ PMH of metastatic prostate CA, chronic retention (intermittently self straight caths) presented for evaluation of rt groin mass he first noted a few weeks ago.  Pt also reported new nonproductive cough for past 2 months.  CTA chest was done on admission, pulmonary embolism was ruled out but revealed multiple pulmonary nodules.  Oncology consulted, advised IR evaluation for biopsy of groin mass which was done 12/09.         Right groin mass   - Concern for small cell carcinoma prostate transformation vs secondary malignancy  - s/p IR guided biopsy 12/09; f/u path results   - Oncology following and recs noted       Metastatic prostate cancer / chronic urinary retention   - c/w Prednisone and abiraterone  - c/w Finasteride and Flomax  - Bladder scans have been without retention   - Analgesics as needed   - Ucx prelim growing Enterococcus however pt asymptomatic, no leukocytosis, afebrile; no indication to treat at this time but will monitor cbc and vitals  - Palliative care eval for pain management       Pulmonary nodules  - Likely sec metastatic disease vs secondary malignancy   - Anti tussives as needed  - CT chest reviewed and noted above   - Will need repeat CT chest in 3 months  - Oncology following and recs noted   - Will refer to pulm on discharge       Hypokalemia and hypophosphatemia  - Low again this morning and trending down   - Will supplement  - Replete to maintain K>4 and phos>3      Normocytic anemia   - H/H fluctuating but hemodynamically stable   - No active bleeding reported   - Has metastatic cancer which likely contributing  - Monitor CBC and transfuse as needed       VTE ppx: lovenox SQ      Dispo: Pt remains acute requiring inpt hospitalization.

## 2022-12-11 NOTE — DISCHARGE NOTE PROVIDER - NSDCFUSCHEDAPPT_GEN_ALL_CORE_FT
Brooks Jefferson  [facility]  UROLOGY [address]  Scheduled Appointment: 12/27/2022    Mannie Pena  [facility]  [facility]  Scheduled Appointment: 12/28/2022    [facility]  [facility]  Scheduled Appointment: 01/03/2023    [facility]  [facility]  Scheduled Appointment: 02/07/2023

## 2022-12-11 NOTE — DISCHARGE NOTE PROVIDER - NSDCCPCAREPLAN_GEN_ALL_CORE_FT
PRINCIPAL DISCHARGE DIAGNOSIS  Diagnosis: Multiple pulmonary nodules  Assessment and Plan of Treatment: Follow up with oncology and pulmonology.  Will likely need repeat CT chest in 3 months.      SECONDARY DISCHARGE DIAGNOSES  Diagnosis: Lymph node enlargement  Assessment and Plan of Treatment: Biopsy performed via interventional radiology.  Pathology results pending.  Follow up with oncology out patient.    Diagnosis: Hypophosphatemia  Assessment and Plan of Treatment: Supplemented and improved.  Repeat phosphate levels within 1 week of discharge to reassess.    Diagnosis: Hypokalemia  Assessment and Plan of Treatment: Supplemented and improved.  Repeat potassium levels within 1 week of discharge to reassess.     PRINCIPAL DISCHARGE DIAGNOSIS  Diagnosis: Multiple pulmonary nodules  Assessment and Plan of Treatment: Follow up with oncology and pulmonology.  Will likely need repeat CT chest in 3 months.      SECONDARY DISCHARGE DIAGNOSES  Diagnosis: Lymph node enlargement  Assessment and Plan of Treatment: Biopsy performed via interventional radiology.  Pathology results pending.  Follow up with oncology out patient.    Diagnosis: Hypophosphatemia  Assessment and Plan of Treatment: Supplemented and improved.  Repeat phosphate levels within 1 week of discharge to reassess.  Continue with supplements as prescribed.    Diagnosis: Hypokalemia  Assessment and Plan of Treatment: Supplemented and improved.  Repeat potassium levels within 1 week of discharge to reassess.

## 2022-12-11 NOTE — DISCHARGE NOTE PROVIDER - NSDCMRMEDTOKEN_GEN_ALL_CORE_FT
abiraterone 250 mg oral tablet: 4 tab(s) orally once a day  finasteride 5 mg oral tablet: 1 tab(s) orally once a day  predniSONE 5 mg oral tablet: 1 tab(s) orally once a day  tamsulosin 0.4 mg oral capsule: 1 cap(s) orally once a day (at bedtime)   abiraterone 250 mg oral tablet: 4 tab(s) orally once a day  benzonatate 100 mg oral capsule: 2 cap(s) orally every 8 hours, As needed, Cough  finasteride 5 mg oral tablet: 1 tab(s) orally once a day  guaiFENesin 200 mg oral tablet: 1 tab(s) orally every 4 hours, As Needed -for cough   PHOS-NaK oral powder for reconstitution: 1 packet(s) orally 2 times a day   predniSONE 5 mg oral tablet: 1 tab(s) orally once a day  tamsulosin 0.4 mg oral capsule: 1 cap(s) orally once a day (at bedtime)

## 2022-12-11 NOTE — DISCHARGE NOTE PROVIDER - HOSPITAL COURSE
71 y/o M w/ PMH of metastatic prostate CA w/ mets to bone, chronic retention (intermittently self straight caths) presented for evaluation of rt groin mass he first noted a few weeks ago.  Pt also reported new nonproductive cough for past 2 months.  CTA chest was done on admission, pulmonary embolism was ruled out but revealed multiple pulmonary nodules.  Oncology consulted, advised IR evaluation for biopsy of groin mass which was done 12/09 to r/o small cell carcinoma prostate transformation vs secondary malignancy.  Pathology pending and will f/u with oncology outpt.  Pt has hx of chronic urinary retention and was bladder scanned but not retention noted.   Ucx growing Enterococcus however discussed w/ ID and given pt asymptomatic, no leukocytosis, afebrile there is no indication to treat at this time.  Pt was monitored off abx and remained stable.  Pulmonary nodules noted on CT chest and suspect prostate mets vs secondary malignancy.  Oncology following and will refer to pulmonology outpt for continued monitoring.  Hypokalemia and hypophosphatemia supplemented.

## 2022-12-11 NOTE — DISCHARGE NOTE PROVIDER - DETAILS OF MALNUTRITION DIAGNOSIS/DIAGNOSES
This patient has been assessed with a concern for Malnutrition and was treated during this hospitalization for the following Nutrition diagnosis/diagnoses:     -  12/12/2022: Moderate protein-calorie malnutrition

## 2022-12-12 NOTE — DIETITIAN INITIAL EVALUATION ADULT - ETIOLOGY
related to inability to meet sufficient protein-energy needs in setting of metastatic prostate CA, advanced age

## 2022-12-12 NOTE — DIETITIAN INITIAL EVALUATION ADULT - OTHER INFO
72M with a history of metastatic prostate cancer who presented with right groin pain and mass. The patient reported that he had first noted a small bump in the right groin about two weeks prior which began to grow in size. He was referred to a surgeon for possible hernia but it was deemed not to be a hernia. The bump continued to increase in size and was uncomfortable. He was advised to present to the hospital for further evaluation by his oncologist. The patient also reported a chronic non-productive cough over the past month. The patient denied any associated fevers or chills. He denied any recent sick contacts. CT of the chest was notable for metastatic disease and the patient was thought to benefit from biopsy for further diagnosis.

## 2022-12-12 NOTE — PROGRESS NOTE ADULT - REASON FOR ADMISSION
Evaluate for pain management and GOC for Metastatic Prostate Ca  Abnormal findings on radiologic and other examination of lung field  PMH of Prostate Cancer
Rt groin mass
Rt groin mass

## 2022-12-12 NOTE — DIETITIAN INITIAL EVALUATION ADULT - PERTINENT MEDS FT
MEDICATIONS  (STANDING):  abiraterone 1000 milliGRAM(s) Oral daily  acetaminophen     Tablet .. 650 milliGRAM(s) Oral every 8 hours  enoxaparin Injectable 40 milliGRAM(s) SubCutaneous every 24 hours  finasteride 5 milliGRAM(s) Oral daily  lidocaine   4% Patch 2 Patch Transdermal daily  oxyCODONE  ER Tablet 15 milliGRAM(s) Oral <User Schedule>  predniSONE   Tablet 5 milliGRAM(s) Oral daily  senna 2 Tablet(s) Oral at bedtime  tamsulosin 0.8 milliGRAM(s) Oral at bedtime    MEDICATIONS  (PRN):  benzonatate 200 milliGRAM(s) Oral every 8 hours PRN Cough  bisacodyl 5 milliGRAM(s) Oral every 12 hours PRN Constipation  guaifenesin/dextromethorphan Oral Liquid 10 milliLiter(s) Oral every 6 hours PRN Cough  oxyCODONE    IR 10 milliGRAM(s) Oral every 4 hours PRN Severe Pain (7 - 10)

## 2022-12-12 NOTE — DIETITIAN INITIAL EVALUATION ADULT - PERTINENT LABORATORY DATA
12-12    134<L>  |  97  |  11.2  ----------------------------<  91  3.7   |  21.0<L>  |  0.64    Ca    7.4<L>      12 Dec 2022 04:52  Phos  1.1     12-12  Mg     1.9     12-12

## 2022-12-12 NOTE — DIETITIAN NUTRITION RISK NOTIFICATION - ADDITIONAL COMMENTS/DIETITIAN RECOMMENDATIONS
1) Add Ensure Enlive BID  2) Rx MVI daily  3) Bowel regimen PRN  4) Encourage HBV protein sources  5) Monitor weights daily for trend/accuracy

## 2022-12-12 NOTE — PROGRESS NOTE ADULT - NUTRITIONAL ASSESSMENT
This patient has been assessed with a concern for Malnutrition and has been determined to have a diagnosis/diagnoses of Moderate protein-calorie malnutrition.    This patient is being managed with:   Diet Regular-  Entered: Dec  7 2022  3:46PM

## 2022-12-12 NOTE — DIETITIAN INITIAL EVALUATION ADULT - ORAL INTAKE PTA/DIET HISTORY
Pt reports fair PO intake PTA completing ~50% of meals in house 2/2 constipation, BM this morning per Pt s/p bowel regimen. Pt states weight has remained stable ~172lbs doesn't follow a diet at home. Encouraged intake of fluids, prioritize protein. Pt receptive and agreeable to trial Ensure.

## 2022-12-12 NOTE — PROGRESS NOTE ADULT - ASSESSMENT
73 yo M PMH of Prostate Cancer, currently being treated by Oncologist Dr. Pena, on oral chemotherapy. Oncologist also prescribing his pain medications. He has become Opioid dependent and Tolerant. Pain has been worsening past week or two. Found to have Metastatic Lung cancer, and is S/P R groin Bx to identify future treatment strategy    Prostate Cancer -Diagnosed Sept '21  newly discovered: Metastasized to Lung  S/P R groin enlarged lymph node biopsy Friday  Currently on oral chemotherapy  Experiencing Ruthy metastasis pain,  having difficulty ambulating  Phosphorus Low at 0.7  D-Dimer > to 492    Acute on Chronic Cancer Pain  Not much relief obtained with addition of Oxycontin 15 mg Q12  Needed Lidocaine patch 4% applied to R rib cage for severe pain  Diffuse bone mets, and pleuritic pain with coughing  Oncology treating his pain with Oxycodone IR 10mg 4 6x/d  Increased  Oxycontin ER to 20 mg PO Q12 hrs for more consistent pain relief  leaving short acting opioid Oxycodone IR 10 mg for BTP Q4h prn  He has become opioid tolerant and dependent  Added Tylenol 650 PO Q8h intervals  Lidocaine 4% patches x 2 for R shoulder and R lateral rib cage pain    Opioid Induced Constipation  On Senna 2 tabs and stool softener at home-   Senna without stool softener available here   Dulcolax 5 mg PO Q12h prn constipation  Relistor 12 mg every other day for constipation as needed ordered today  Fluids encouraged    Nagging Dry cough  Less frequent, before coming into the hospital"   Tessalon 200 mg PO Q8h  B/L Pulmonary nodules and enlarged lymph nodes-increased in size and number since 12/1  Rapidly growing cancer    GOC  Better pain relief  Wait and see results of Lymph node biopsy  F/U with Oncologist to discuss treatment options if there are any offered  Patient has brother Donny as his HCP-has a living will. His brother knows what his end of life wishes are, and will carry them out.  Will ask brother Donny to bring in whatever documents he has regarding these Directives to copy for our chart  Briefly discussed the idea of getting a home hospice consult, if he will not be continuing chemotherapy treatment, otherwise consider after Rx is completed.

## 2022-12-12 NOTE — PROGRESS NOTE ADULT - SUBJECTIVE AND OBJECTIVE BOX
Chief Complaint:  rt groin mass    SUBJECTIVE / OVERNIGHT EVENTS:  No acute events reported overnight.  No retention on bladder scan.  Pt offers no acute complaints at this time and cough is slowly improving.  Pt denies sob, palpitations, abd pain, N/V, diarrhea.           I&O's Summary    10 Dec 2022 07:01  -  10 Dec 2022 11:53  --------------------------------------------------------  IN: 320 mL / OUT: 500 mL / NET: -180 mL          PHYSICAL EXAM:  Vital Signs Last 24 Hrs  T(C): 36.6 (11 Dec 2022 10:50), Max: 36.9 (10 Dec 2022 22:17)  T(F): 97.8 (11 Dec 2022 10:50), Max: 98.4 (10 Dec 2022 22:17)  HR: 95 (11 Dec 2022 10:50) (90 - 95)  BP: 135/70 (11 Dec 2022 10:50) (135/70 - 161/86)  BP(mean): --  RR: 18 (11 Dec 2022 10:50) (18 - 18)  SpO2: 91% (11 Dec 2022 10:50) (91% - 95%)    Parameters below as of 11 Dec 2022 05:14  Patient On (Oxygen Delivery Method): room air        GENERAL: pt examined bedside, laying comfortably in bed in NAD  HEENT: NC/AT, moist oral mucosa, clear conjunctiva, sclera nonicteric  RESPIRATORY: Normal respiratory effort, no wheezing, rhonchi, rales  CARDIOVASCULAR: RRR, normal S1 and S2  ABDOMEN: soft, NT/ND, +BS, no rebound/guarding, +rt groin mass  EXTREMITIES: No cynaosis, no clubbing, no lower extremity edema, pulses are 2+ bilaterally  NEUROLOGY: A+O to person, place, and time, no focal neurologic deficits appreciated   SKIN: No rashes or no palpable lesions        LABS:                                   10.3   8.10  )-----------( 389      ( 11 Dec 2022 04:36 )             31.2       12-11    135  |  101  |  12.2  ----------------------------<  92  3.2<L>   |  22.0  |  0.68    Ca    7.4<L>      11 Dec 2022 04:36  Phos  0.7     12-11  Mg     2.2     12-11        Culture - Urine (collected 07 Dec 2022 12:45)  Source: Clean Catch Clean Catch (Midstream)  Preliminary Report (09 Dec 2022 09:37):    >100,000 CFU/ml Enterococcus species        CAPILLARY BLOOD GLUCOSE          RADIOLOGY & ADDITIONAL TESTS:    < from: CT Angio Chest PE Protocol w/ IV Cont (12.07.22 @ 14:40) >  IMPRESSION:    No pulmonary embolus.    Innumerable bilateral pulmonary nodules which have increased in size and   number since 12/1/2022.    < end of copied text >        MEDICATIONS  (STANDING):  abiraterone 1000 milliGRAM(s) Oral daily  acetaminophen     Tablet .. 650 milliGRAM(s) Oral every 8 hours  finasteride 5 milliGRAM(s) Oral daily  lidocaine   4% Patch 2 Patch Transdermal daily  oxyCODONE  ER Tablet 15 milliGRAM(s) Oral <User Schedule>  predniSONE   Tablet 5 milliGRAM(s) Oral daily  senna 2 Tablet(s) Oral at bedtime  tamsulosin 0.8 milliGRAM(s) Oral at bedtime    MEDICATIONS  (PRN):  benzonatate 200 milliGRAM(s) Oral every 8 hours PRN Cough  bisacodyl 5 milliGRAM(s) Oral every 12 hours PRN Constipation  guaifenesin/dextromethorphan Oral Liquid 10 milliLiter(s) Oral every 6 hours PRN Cough  oxyCODONE    IR 10 milliGRAM(s) Oral every 4 hours PRN Severe Pain (7 - 10)                                  
  Chief Complaint:  rt groin mass    SUBJECTIVE / OVERNIGHT EVENTS:  No acute events reported overnight.  Pt reports pain is controlled w/ medications.  He notes slight improvement of nonproductive cough.  He denies chest pain, SOB, abd pain, N/V, dysuria, hematuria.  Unsure if he is having retention issues but reports having hx of urinary retention and reports he was told by outpt urology to straight cath to make sure he is completely voiding but has not been doing that while here.          I&O's Summary    10 Dec 2022 07:01  -  10 Dec 2022 11:53  --------------------------------------------------------  IN: 320 mL / OUT: 500 mL / NET: -180 mL          PHYSICAL EXAM:  Vital Signs Last 24 Hrs  T(C): 36.6 (10 Dec 2022 11:01), Max: 36.6 (09 Dec 2022 16:27)  T(F): 97.8 (10 Dec 2022 11:01), Max: 97.9 (09 Dec 2022 16:27)  HR: 91 (10 Dec 2022 11:01) (81 - 91)  BP: 157/81 (10 Dec 2022 11:01) (125/73 - 157/81)  BP(mean): --  RR: 18 (10 Dec 2022 11:01) (14 - 18)  SpO2: 93% (10 Dec 2022 11:01) (91% - 94%)    Parameters below as of 10 Dec 2022 11:01  Patient On (Oxygen Delivery Method): room air        GENERAL: pt examined bedside, laying comfortably in bed in NAD  HEENT: NC/AT, moist oral mucosa, clear conjunctiva, sclera nonicteric  RESPIRATORY: Normal respiratory effort, no wheezing, rhonchi, rales  CARDIOVASCULAR: RRR, normal S1 and S2  ABDOMEN: soft, NT/ND, +BS, no rebound/guarding, +rt groin mass  EXTREMITIES: No cynaosis, no clubbing, no lower extremity edema, pulses are 2+ bilaterally  NEUROLOGY: A+O to person, place, and time, no focal neurologic deficits appreciated   SKIN: No rashes or no palpable lesions        LABS:                        11.0   9.42  )-----------( 392      ( 09 Dec 2022 05:52 )             32.7     12-09    138  |  103  |  15.4  ----------------------------<  89  3.3<L>   |  20.0<L>  |  0.60    Ca    8.4      09 Dec 2022 05:52  Phos  0.7     12-09  Mg     2.1     12-09          Culture - Urine (collected 07 Dec 2022 12:45)  Source: Clean Catch Clean Catch (Midstream)  Preliminary Report (09 Dec 2022 09:37):    >100,000 CFU/ml Enterococcus species        CAPILLARY BLOOD GLUCOSE          RADIOLOGY & ADDITIONAL TESTS:    < from: CT Angio Chest PE Protocol w/ IV Cont (12.07.22 @ 14:40) >  IMPRESSION:    No pulmonary embolus.    Innumerable bilateral pulmonary nodules which have increased in size and   number since 12/1/2022.    < end of copied text >        MEDICATIONS  (STANDING):  abiraterone 1000 milliGRAM(s) Oral daily  acetaminophen     Tablet .. 650 milliGRAM(s) Oral every 8 hours  finasteride 5 milliGRAM(s) Oral daily  lidocaine   4% Patch 2 Patch Transdermal daily  oxyCODONE  ER Tablet 15 milliGRAM(s) Oral <User Schedule>  predniSONE   Tablet 5 milliGRAM(s) Oral daily  senna 2 Tablet(s) Oral at bedtime  tamsulosin 0.8 milliGRAM(s) Oral at bedtime    MEDICATIONS  (PRN):  benzonatate 200 milliGRAM(s) Oral every 8 hours PRN Cough  bisacodyl 5 milliGRAM(s) Oral every 12 hours PRN Constipation  guaifenesin/dextromethorphan Oral Liquid 10 milliLiter(s) Oral every 6 hours PRN Cough  oxyCODONE    IR 10 milliGRAM(s) Oral every 4 hours PRN Severe Pain (7 - 10)                                  
INTERVAL HPI/OVERNIGHT EVENTS:     CC:  right groin mass, metastatic prostate cancer    No overnight events  reports intermittent cough and shortness of breath on ambulation  CTA chest negative for pulmonary embolism      Vital Signs Last 24 Hrs  T(C): 36.7 (09 Dec 2022 11:11), Max: 36.7 (09 Dec 2022 11:11)  T(F): 98 (09 Dec 2022 11:11), Max: 98 (09 Dec 2022 11:11)  HR: 90 (09 Dec 2022 11:11) (84 - 90)  BP: 156/76 (09 Dec 2022 11:11) (153/88 - 179/90)  BP(mean): --  RR: 18 (09 Dec 2022 11:11) (18 - 18)  SpO2: 92% (09 Dec 2022 11:11) (90% - 94%)    Parameters below as of 09 Dec 2022 11:11  Patient On (Oxygen Delivery Method): room air        PHYSICAL EXAM:    GENERAL: alert, not in distress  CHEST/LUNG: b/l air entry  HEART: reg  ABDOMEN: soft, bs+  EXTREMITIES:  no edema, tenderness    MEDICATIONS  (STANDING):  abiraterone 1000 milliGRAM(s) Oral daily  finasteride 5 milliGRAM(s) Oral daily  potassium phosphate / sodium phosphate Tablet (K-PHOS No. 2) 1 Tablet(s) Oral four times a day  predniSONE   Tablet 5 milliGRAM(s) Oral daily  senna 2 Tablet(s) Oral at bedtime  tamsulosin 0.8 milliGRAM(s) Oral at bedtime    MEDICATIONS  (PRN):  acetaminophen     Tablet .. 650 milliGRAM(s) Oral every 6 hours PRN Temp greater or equal to 38C (100.4F), Mild Pain (1 - 3)  benzonatate 200 milliGRAM(s) Oral every 8 hours PRN Cough  bisacodyl 5 milliGRAM(s) Oral every 12 hours PRN Constipation  guaifenesin/dextromethorphan Oral Liquid 10 milliLiter(s) Oral every 6 hours PRN Cough  oxyCODONE    IR 10 milliGRAM(s) Oral every 4 hours PRN Severe Pain (7 - 10)      Allergies    No Known Allergies    Intolerances          LABS:                          11.0   9.42  )-----------( 392      ( 09 Dec 2022 05:52 )             32.7     12-09    138  |  103  |  15.4  ----------------------------<  89  3.3<L>   |  20.0<L>  |  0.60    Ca    8.4      09 Dec 2022 05:52  Phos  0.7     12-09  Mg     2.1     12-09      PT/INR - ( 08 Dec 2022 06:25 )   PT: 15.4 sec;   INR: 1.32 ratio               RADIOLOGY & ADDITIONAL TESTS:  
IR Post Procedure Note    Diagnosis: prostate cancer with concerns for small cell carcinoma prostate transformation  vs secondary malignancy    Procedure: right groin lymph node Biopsy    : David Foster MD, Eusebio MENDIOLA    Contrast: None    Anesthesia: 5cc 1% Lidocaine Subcutaneous    Estimated Blood Loss: Less than 10cc    Specimens: Specimens identified, labeled, confirmed and sent to lab.    Complications: No Immediate Complications    Anticoagulation: Resume in 48 Hours    Findings & Plan: multiple 16g core biopsies of lymph node obtained w Corvorcet biopsy needle under US guidance. No hematoma or complications on post procedure US.    Please call Interventional Radiology with any questions, concerns, or issues. 
INTERVAL HPI/OVERNIGHT EVENTS:    CC: right groin mass, metastatic prostate cancer      Chart and course reviewed.  No overnight events  reports has cough for about 2 months  CT chest findings discussed  lives alone, uses cane, no recent falls      Vital Signs Last 24 Hrs  T(C): 36.5 (08 Dec 2022 11:11), Max: 36.6 (07 Dec 2022 20:22)  T(F): 97.7 (08 Dec 2022 11:11), Max: 97.8 (07 Dec 2022 20:22)  HR: 92 (08 Dec 2022 11:11) (90 - 96)  BP: 147/68 (08 Dec 2022 11:11) (138/89 - 170/68)  BP(mean): --  RR: 18 (08 Dec 2022 11:11) (16 - 18)  SpO2: 93% (08 Dec 2022 11:11) (92% - 93%)    Parameters below as of 08 Dec 2022 11:11  Patient On (Oxygen Delivery Method): room air        PHYSICAL EXAM:    GENERAL: elderly male, alert, not in distress  CHEST/LUNG: b/l air entry  HEART: reg  ABDOMEN: soft, bs+  EXTREMITIES:  no edema, tenderness. + right groin mass, non tender, hard, no erythema noted    MEDICATIONS  (STANDING):  abiraterone 1000 milliGRAM(s) Oral daily  finasteride 5 milliGRAM(s) Oral daily  predniSONE   Tablet 5 milliGRAM(s) Oral daily  tamsulosin 0.8 milliGRAM(s) Oral at bedtime    MEDICATIONS  (PRN):  acetaminophen     Tablet .. 650 milliGRAM(s) Oral every 6 hours PRN Temp greater or equal to 38C (100.4F), Mild Pain (1 - 3)  benzonatate 100 milliGRAM(s) Oral every 8 hours PRN Cough  oxyCODONE    IR 10 milliGRAM(s) Oral every 4 hours PRN Severe Pain (7 - 10)      Allergies    No Known Allergies    Intolerances          LABS:                          10.4   8.39  )-----------( 369      ( 08 Dec 2022 06:25 )             30.6     12-08    138  |  103  |  18.0  ----------------------------<  95  3.1<L>   |  22.0  |  0.68    Ca    8.1<L>      08 Dec 2022 06:25  Mg     2.3         TPro  6.8  /  Alb  3.7  /  TBili  0.4  /  DBili  x   /  AST  35  /  ALT  11  /  AlkPhos  132<H>  12    PT/INR - ( 08 Dec 2022 06:25 )   PT: 15.4 sec;   INR: 1.32 ratio         PTT - ( 07 Dec 2022 11:55 )  PTT:28.3 sec  Urinalysis Basic - ( 07 Dec 2022 12:45 )    Color: Yellow / Appearance: Slightly Turbid / S.020 / pH: x  Gluc: x / Ketone: Small  / Bili: Negative / Urobili: Negative mg/dL   Blood: x / Protein: 30 mg/dL / Nitrite: Negative   Leuk Esterase: Trace / RBC: 3-5 /HPF / WBC 0-2 /HPF   Sq Epi: x / Non Sq Epi: Occasional / Bacteria: Few        RADIOLOGY & ADDITIONAL TESTS:  
OVERNIGHT EVENTS:  F/U Note  Patient having severe breakthrough R sided rib pain "stabbing" and sharp"  Doesn't feel addition of Oxycontin ER 15 mg Q12 has really made a difference in his pain  + BM today still constiipated  Still coughing but not as much  No hypoxic events  Deneis N/V/D CP, palpitations headache dizziness dysuria    Present Symptoms:     Dyspnea: Mild Moderate   Nausea/Vomiting:  No  Anxiety:  Yes   Depression:  No  Fatigue: Yes   Loss of appetite: No  Constipation: yes    Pain: R rib cage and back            Character-            Duration-            Effect-            Factors-            Frequency-            Location-            Severity-moderate to severe    Review of Systems: Reviewed                     All others negative    MEDICATIONS  (STANDING):  abiraterone 1000 milliGRAM(s) Oral daily  acetaminophen     Tablet .. 650 milliGRAM(s) Oral every 8 hours  enoxaparin Injectable 40 milliGRAM(s) SubCutaneous every 24 hours  finasteride 5 milliGRAM(s) Oral daily  lidocaine   4% Patch 2 Patch Transdermal daily  oxyCODONE  ER Tablet 15 milliGRAM(s) Oral <User Schedule>  predniSONE   Tablet 5 milliGRAM(s) Oral daily  senna 2 Tablet(s) Oral at bedtime  tamsulosin 0.8 milliGRAM(s) Oral at bedtime    MEDICATIONS  (PRN):  benzonatate 200 milliGRAM(s) Oral every 8 hours PRN Cough  bisacodyl 5 milliGRAM(s) Oral every 12 hours PRN Constipation  guaifenesin/dextromethorphan Oral Liquid 10 milliLiter(s) Oral every 6 hours PRN Cough  oxyCODONE    IR 10 milliGRAM(s) Oral every 4 hours PRN Severe Pain (7 - 10)    PHYSICAL EXAM:    Vital Signs Last 24 Hrs  T(C): 36.9 (12 Dec 2022 12:27), Max: 36.9 (11 Dec 2022 17:41)  T(F): 98.4 (12 Dec 2022 12:27), Max: 98.4 (11 Dec 2022 17:41)  HR: 107 (12 Dec 2022 12:27) (95 - 107)  BP: 135/78 (12 Dec 2022 12:27) (134/78 - 135/78)  BP(mean): --  RR: 18 (12 Dec 2022 12:27) (18 - 19)  SpO2: 90% (12 Dec 2022 12:27) (90% - 95%)    Parameters below as of 12 Dec 2022 12:27  Patient On (Oxygen Delivery Method): room air    General: alert  oriented x __3__ awake somewhat anxious about pain                  cachexia  verbal      Karnofsky:40  %    HEENT: normal      Lungs:  tachypnea/labored breathing      CV: tachycardia    GI: normal  distended      constipation  last BM:     : voiding    MSK:   weakness              ambulatory  short distances becomes dyspneuc    Skin:   no rash    LABS:                          11.0   9.83  )-----------( 407      ( 12 Dec 2022 04:52 )             32.3     12-12    134<L>  |  97  |  11.2  ----------------------------<  91  3.7   |  21.0<L>  |  0.64    Ca    7.4<L>      12 Dec 2022 04:52  Phos  1.1     12-12  Mg     1.9     12-12    I&O's Summary    11 Dec 2022 07:01  -  12 Dec 2022 07:00  --------------------------------------------------------  IN: 500 mL / OUT: 0 mL / NET: 500 mL    12 Dec 2022 07:01  -  12 Dec 2022 17:07  --------------------------------------------------------  IN: 415 mL / OUT: 0 mL / NET: 415 mL    RADIOLOGY & ADDITIONAL STUDIES:  < from: CT Angio Chest PE Protocol w/ IV Cont (12.07.22 @ 14:40) >  IMPRESSION:    No pulmonary embolus.    Innumerable bilateral pulmonary nodules which have increased in size and   number since 12/1/2022.    --- End of Report ---    < end of copied text >      ADVANCE DIRECTIVES/TREATMENT PREFERENCES:  DNR NO  Completed on:                     MOLST   NO   Completed on:  Living Will   NO   Completed on:
  Chief Complaint:  rt groin mass    SUBJECTIVE / OVERNIGHT EVENTS:  No acute events reported overnight.  Pt offers no acute complaints at this time and cough persist but improving.  Pt denies sob, palpitations, abd pain, N/V, diarrhea.           I&O's Summary    10 Dec 2022 07:01  -  10 Dec 2022 11:53  --------------------------------------------------------  IN: 320 mL / OUT: 500 mL / NET: -180 mL          PHYSICAL EXAM:  Vital Signs Last 24 Hrs  T(C): 36.8 (12 Dec 2022 05:02), Max: 36.9 (11 Dec 2022 17:41)  T(F): 98.3 (12 Dec 2022 05:02), Max: 98.4 (11 Dec 2022 17:41)  HR: 98 (12 Dec 2022 05:02) (95 - 98)  BP: 135/76 (12 Dec 2022 05:02) (134/78 - 135/76)  BP(mean): --  RR: 18 (12 Dec 2022 05:02) (18 - 19)  SpO2: 95% (12 Dec 2022 05:02) (92% - 95%)    Parameters below as of 12 Dec 2022 05:02  Patient On (Oxygen Delivery Method): room air        GENERAL: pt examined bedside, laying comfortably in bed in NAD  HEENT: NC/AT, moist oral mucosa, clear conjunctiva, sclera nonicteric  RESPIRATORY: Normal respiratory effort, no wheezing, rhonchi, rales  CARDIOVASCULAR: RRR, normal S1 and S2  ABDOMEN: soft, NT/ND, +BS, no rebound/guarding, +rt groin mass  EXTREMITIES: No cynaosis, no clubbing, no lower extremity edema, pulses are 2+ bilaterally  NEUROLOGY: A+O to person, place, and time, no focal neurologic deficits appreciated   SKIN: No rashes or no palpable lesions        LABS:                                                    11.0   9.83  )-----------( 407      ( 12 Dec 2022 04:52 )             32.3       12-12    134<L>  |  97  |  11.2  ----------------------------<  91  3.7   |  21.0<L>  |  0.64    Ca    7.4<L>      12 Dec 2022 04:52  Phos  1.1     12-12  Mg     1.9     12-12        Culture - Urine (collected 07 Dec 2022 12:45)  Source: Clean Catch Clean Catch (Midstream)  Preliminary Report (09 Dec 2022 09:37):    >100,000 CFU/ml Enterococcus species        CAPILLARY BLOOD GLUCOSE          RADIOLOGY & ADDITIONAL TESTS:    < from: CT Angio Chest PE Protocol w/ IV Cont (12.07.22 @ 14:40) >  IMPRESSION:    No pulmonary embolus.    Innumerable bilateral pulmonary nodules which have increased in size and   number since 12/1/2022.    < end of copied text >        MEDICATIONS  (STANDING):  abiraterone 1000 milliGRAM(s) Oral daily  acetaminophen     Tablet .. 650 milliGRAM(s) Oral every 8 hours  finasteride 5 milliGRAM(s) Oral daily  lidocaine   4% Patch 2 Patch Transdermal daily  oxyCODONE  ER Tablet 15 milliGRAM(s) Oral <User Schedule>  predniSONE   Tablet 5 milliGRAM(s) Oral daily  senna 2 Tablet(s) Oral at bedtime  tamsulosin 0.8 milliGRAM(s) Oral at bedtime    MEDICATIONS  (PRN):  benzonatate 200 milliGRAM(s) Oral every 8 hours PRN Cough  bisacodyl 5 milliGRAM(s) Oral every 12 hours PRN Constipation  guaifenesin/dextromethorphan Oral Liquid 10 milliLiter(s) Oral every 6 hours PRN Cough  oxyCODONE    IR 10 milliGRAM(s) Oral every 4 hours PRN Severe Pain (7 - 10)

## 2022-12-12 NOTE — PROGRESS NOTE ADULT - ASSESSMENT
73 y/o M w/ PMH of metastatic prostate CA, chronic retention (intermittently self straight caths) presented for evaluation of rt groin mass he first noted a few weeks ago.  Pt also reported new nonproductive cough for past 2 months.  CTA chest was done on admission, pulmonary embolism was ruled out but revealed multiple pulmonary nodules.  Oncology consulted, advised IR evaluation for biopsy of groin mass which was done 12/09.   Pt noted to have abnormal lytes and repletion ongoing.       Right groin mass   - Concern for small cell carcinoma prostate transformation vs secondary malignancy  - s/p IR guided biopsy 12/09; f/u path results   - Oncology following and recs noted       Metastatic prostate cancer / chronic urinary retention   - c/w Prednisone and abiraterone  - c/w Finasteride and Flomax  - Bladder scans have been without retention   - Analgesics as needed   - Ucx prelim growing Enterococcus however pt asymptomatic, no leukocytosis, afebrile; no indication to treat at this time but will monitor cbc and vitals  - Palliative care eval for pain management       Pulmonary nodules  - Likely sec metastatic disease vs secondary malignancy   - Anti tussives as needed  - CT chest reviewed and noted above   - Will need repeat CT chest in 3 months  - Oncology following and recs noted   - Will refer to pulm on discharge       Hypokalemia and hypophosphatemia  - Potassium WNL  - Phos persistently low; repletion ongoing   - Replete to maintain K>4 and phos>3      Normocytic anemia   - H/H fluctuating but hemodynamically stable   - No active bleeding reported   - Has metastatic cancer which likely contributing  - Monitor CBC and transfuse as needed       VTE ppx: lovenox SQ      Dispo: Pt remains acute requiring inpt hospitalization.

## 2022-12-12 NOTE — PROGRESS NOTE ADULT - NSPROGADDITIONALINFOA_GEN_ALL_CORE
Total Time Spent__25__ minutes  This includes chart review, patient assessment, discussion and collaboration with interdisciplinary team members, ACP planning    COUNSELING:  Face to face meeting to discuss Advanced Care Planning - Time Spent 5______Minutes.      Thank you for the opportunity to assist with the care of this patient.   [facility] Palliative Medicine Consult Service [phone].

## 2022-12-13 NOTE — DISCHARGE NOTE NURSING/CASE MANAGEMENT/SOCIAL WORK - PATIENT PORTAL LINK FT
You can access the FollowMyHealth Patient Portal offered by [facility] by registering at the following website: [email or web address]. By joining [employer]’s FollowMyHealth portal, you will also be able to view your health information using other applications (apps) compatible with our system.

## 2022-12-13 NOTE — DISCHARGE NOTE NURSING/CASE MANAGEMENT/SOCIAL WORK - NSDCPEFALRISK_GEN_ALL_CORE
For information on Fall & Injury Prevention, visit: [email or web address] OR  [email or web address] OR  https://www.cdc.gov/steadi/patient.html

## 2022-12-18 NOTE — ED PROVIDER NOTE - NS ED ROS FT
Constitutional: no fever, no chills  Head: NC, AT   Eyes: no redness   ENMT: +nasal congestion/drainage, no sore throat   CV: +chest pain, +edema  Resp: +cough, +dyspnea  GI: no abdominal pain, +nausea, +vomiting, no diarrhea  : no dysuria, no hematuria   Skin: no lesions, no rashes   Neuro: no LOC, no headache, no sensory deficits, no weakness

## 2022-12-18 NOTE — ED ADULT TRIAGE NOTE - CHIEF COMPLAINT QUOTE
right lower leg swelling. s/p biopsy on right groin. entire leg is swollen. also c/o cough and headache. hx of cancer in bones.

## 2022-12-18 NOTE — ED ADULT NURSE NOTE - OBJECTIVE STATEMENT
c/o right lower leg swelling with intermittent midsternal chest pain, moist non productive cough, fatigue/general weakness and headache. s/p biopsy on right groin. Pt denies any falls. hx of cancer in bones. On 3L of O2 via NC maintaining SPO2 of 92-94%.

## 2022-12-18 NOTE — ED PROVIDER NOTE - IV ALTEPLASE INCLUSION HIDDEN
Patient asleep but easily arousable with parents at the bedside. Patient tolerated po fluids and food here in ED, patient able to move his neck without difficulty while giving po tylenol. Parents updated on lab results. Awaiting disposition, will continue to monitor.
Patient awake and alert with parents at the bedside, ok to be discharged at this time as per Dr. Sommer, parents state understanding of discharge instructions and feel comfortable to go home at this time.
Patient awake and alert with parents at the bedside, patient tolerating po fluids and food, po tylenol administered for fever. Awaiting lab results, will continue to monitor, awaiting disposition.
show

## 2022-12-18 NOTE — ED PROVIDER NOTE - PHYSICAL EXAMINATION
General: ill appearing, NAD  Head: NC, AT  EENT: EOMI, no scleral icterus  Cardiac: tachycardic, no apparent murmurs, +right lower extremity edema  Respiratory: left lower lobe crackles, + respiratory distress   Abdomen: soft, ND, NT, nonperitonitic  MSK/Vascular: full ROM, soft compartments, warm extremities  Neuro: AAOx3, sensation to light touch intact  Psych: calm, cooperative

## 2022-12-18 NOTE — ED PROVIDER NOTE - CLINICAL SUMMARY MEDICAL DECISION MAKING FREE TEXT BOX
Pt presenting with sob and cough. Given antibiotics and blood cutlures done. VBG showed co2 not elevated. Elevated wbc. CTA showed worsening of the cancer burden with new effusion. Will admit.

## 2022-12-18 NOTE — ED PROVIDER NOTE - OBJECTIVE STATEMENT
72 y m with pmh of metastatic prostate cancer presenting for cough and dyspnea associated with right lower leg swelling over the last 5 days. Cough has been productive. Pt has never been on O2 but now requiring 3 L to maintain 93%. 89 on RA. Is not on blood thinners. Recently had a biopsy in this hospital. Is getting chemo for cancer. Today had nbnb n/v. Had an episode of chest pain two days ago that self resolved. Denies f/c, ab pain, dysuria, HA.

## 2022-12-18 NOTE — ED PROVIDER NOTE - ATTENDING CONTRIBUTION TO CARE
71 yo M presents to ED c/o increased cough with assoc SOB and hypoxia  x last 5 days.  Pt with hx of metastatic prostate Ca with pulmonary and bony metastasis and chemotx and  s/p admission for recent IR biopsy of groin mass.  Pt requiring oxygen at this time and normally does not.  On exam awake and alert, tachycardic, afebrile.  PERRL, mm moist, neck supple, Cor Tachy, Lungs with scattered crackles, Abd soft, NT, Ext + RLE swelling, Neuro nonfocal.  CXR unchanged with diffuse metastatic ds.  Based on hx will w/u for infectious process and R/O.

## 2022-12-18 NOTE — ED ADULT NURSE REASSESSMENT NOTE - NS ED NURSE REASSESS COMMENT FT1
Pt is back from [facility]. Hooked pt back to cardiac monitor and pulse ox. O2 maintained at 3L via NC SPO2 of 92-95%. With continue to monitor.

## 2022-12-19 NOTE — CHART NOTE - NSCHARTNOTEFT_GEN_A_CORE
Surgery was consulted for SBO on CT read   Went to examined the patient   Who decided to Leave against medical advice as he wants to go to his work Surgery was consulted for SBO on CT read   Went to examined the patient   Who decided to Leave against medical advice as he wants to go to his work  patient made aware of all the risk of leaving AMA Surgery was consulted for SBO on CT read   Went to examine the patient   Who decided to Leave against medical advice as he wants to go to his work  patient made aware of all the risk of leaving AMA

## 2022-12-19 NOTE — CONSULT NOTE ADULT - SUBJECTIVE AND OBJECTIVE BOX
JACK UMANZOR    Patient is a 72y old  Male who presents with a chief complaint of Acute hypoxic respiratory failure 2/2 likely PNA (19 Dec 2022 11:42)      Interval History/Overnight events:    T(C): 36.6 (22 @ 07:28), Max: 36.8 (22 @ 18:25)  HR: 106 (22 @ 07:28)  BP: 146/73 (22 @ 07:28)  RR: 20 (22 @ 07:28)  SpO2: 91% (22 @ 07:28)    PHYSICAL EXAM:    GENERAL: comfortable , looks cachectic and chronically ill   HEENT: Anicteric sclera, EOMI  CHEST/LUNG: Clear to auscultation bilaterally with distant rhonchi   HEART: Regular rate and rhythm; No murmurs, rubs, or gallops  ABDOMEN: Soft, Nontender, Nondistended; Bowel sounds present  EXTREMITIES: No clubbing, cyanosis, or edema      LABS:                          10.6   13.14 )-----------( 510      ( 19 Dec 2022 07:06 )             32.4     12-    136  |  98  |  19.4  ----------------------------<  119<H>  3.3<L>   |  21.0<L>  |  0.65    Ca    8.6      19 Dec 2022 07:06  Mg     2.5     12-18    TPro  6.8  /  Alb  3.1<L>  /  TBili  0.5  /  DBili  x   /  AST  33  /  ALT  10  /  AlkPhos  195<H>  12-18    PT/INR - ( 18 Dec 2022 19:57 )   PT: 15.2 sec;   INR: 1.31 ratio         PTT - ( 18 Dec 2022 19:57 )  PTT:30.8 sec  CARDIAC MARKERS ( 18 Dec 2022 19:57 )  x     / <0.01 ng/mL / x     / x     / x            Urinalysis Basic - ( 19 Dec 2022 06:50 )    Color: Yellow / Appearance: Clear / S.010 / pH: x  Gluc: x / Ketone: Large  / Bili: Negative / Urobili: Negative mg/dL   Blood: x / Protein: 30 mg/dL / Nitrite: Negative   Leuk Esterase: Negative / RBC: 0-2 /HPF / WBC 0-2 /HPF   Sq Epi: x / Non Sq Epi: Occasional / Bacteria: Occasional      LIVER FUNCTIONS - ( 18 Dec 2022 19:57 )  Alb: 3.1 g/dL / Pro: 6.8 g/dL / ALK PHOS: 195 U/L / ALT: 10 U/L / AST: 33 U/L / GGT: x             MEDICATIONS:    MEDICATIONS  (STANDING):  azithromycin  IVPB      cefepime  Injectable. 2000 milliGRAM(s) IV Push every 8 hours  finasteride 5 milliGRAM(s) Oral daily  guaiFENesin Oral Liquid (Sugar-Free) 100 milliGRAM(s) Oral every 4 hours  oxyCODONE  ER Tablet 20 milliGRAM(s) Oral every 12 hours  pantoprazole    Tablet 40 milliGRAM(s) Oral before breakfast  potassium chloride  10 mEq/100 mL IVPB 10 milliEquivalent(s) IV Intermittent every 1 hour  predniSONE   Tablet 5 milliGRAM(s) Oral daily  tamsulosin 0.4 milliGRAM(s) Oral at bedtime      MEDICATIONS  (PRN):  acetaminophen     Tablet .. 650 milliGRAM(s) Oral every 6 hours PRN Temp greater or equal to 38C (100.4F), Mild Pain (1 - 3)  albuterol    90 MICROgram(s) HFA Inhaler 2 Puff(s) Inhalation every 6 hours PRN Shortness of Breath and/or Wheezing  aluminum hydroxide/magnesium hydroxide/simethicone Suspension 30 milliLiter(s) Oral every 4 hours PRN Dyspepsia  benzonatate 100 milliGRAM(s) Oral every 8 hours PRN Cough  melatonin 3 milliGRAM(s) Oral at bedtime PRN Insomnia  ondansetron Injectable 4 milliGRAM(s) IV Push every 8 hours PRN Nausea and/or Vomiting  oxyCODONE    IR 10 milliGRAM(s) Oral every 4 hours PRN Severe Pain (7 - 10)

## 2022-12-19 NOTE — CONSULT NOTE ADULT - TREATMENT GUIDELINE COMMENT
Patient expressed his desire to be on hospice. Wife is in agreement. Decision communicated to primary team.

## 2022-12-19 NOTE — CONSULT NOTE ADULT - SUBJECTIVE AND OBJECTIVE BOX
HISTORY OF PRESENT ILLNESS: 71 yo male with pmhx Prostate Cancer with mets to the bone, recently to the lung, complicated by urinary retention requiring intermittent straight cath presenting to the ED with SOB and right lower extremity swelling. Patient reports frequent cough and that leads to "vomit black stuff". Denies lightheadedness, chest pain, palpitation.  Denies nausea, abdominal pain, diarrhea, constipation or melena. His hemoglobin in ED 10.3 -->10.6 (baseline 10 to 11 gm). Denies heartburn or reflux. CTA chest shows innumerable pulmonary nodules and diffuse osseous metastatic disease (unchanged from 2022). Denies use of NSAIDs, alcohol or smoking.     Review of Systems:  . Constitutional: No fever, chills  . HEENT: Negative  · Respiratory and Thorax: +Cough and shortness of breath.   · Cardiovascular: No chest pain, palpitation, no dizziness, no orthopnea,   · Gastrointestinal: see above.  · Genitourinary: No hematuria  · Musculoskeletal: Negative  · Neurological: no headache, no vision changes, no slurred speech  · Psychiatric: no agitation, no anxiety  · Hematology/Lymphatics: negative  · Endocrine: negative      PAST MEDICAL/SURGICAL HISTORY:  Prostate cancer    S/P appendectomy      SOCIAL HISTORY:  - TOBACCO: Denies  - ALCOHOL: Denies  - ILLICIT DRUG USE: Denies    FAMILY HISTORY:  No known history of gastrointestinal or liver disease;  FH: lung cancer (Sibling)    HOME MEDICATIONS:  abiraterone 250 mg oral tablet: 4 tab(s) orally once a day (07 Dec 2022 17:09)  finasteride 5 mg oral tablet: 1 tab(s) orally once a day (07 Dec 2022 17:09)  predniSONE 5 mg oral tablet: 1 tab(s) orally once a day (07 Dec 2022 17:09)  tamsulosin 0.4 mg oral capsule: 1 cap(s) orally once a day (at bedtime) (07 Dec 2022 17:09)    INPATIENT MEDICATIONS:  MEDICATIONS  (STANDING):  azithromycin  IVPB      cefepime  Injectable. 2000 milliGRAM(s) IV Push every 8 hours  finasteride 5 milliGRAM(s) Oral daily  guaiFENesin Oral Liquid (Sugar-Free) 100 milliGRAM(s) Oral every 4 hours  oxyCODONE  ER Tablet 20 milliGRAM(s) Oral every 12 hours  pantoprazole  Injectable 40 milliGRAM(s) IV Push two times a day  potassium chloride  10 mEq/100 mL IVPB 10 milliEquivalent(s) IV Intermittent every 1 hour  predniSONE   Tablet 5 milliGRAM(s) Oral daily  tamsulosin 0.4 milliGRAM(s) Oral at bedtime    MEDICATIONS  (PRN):  acetaminophen     Tablet .. 650 milliGRAM(s) Oral every 6 hours PRN Temp greater or equal to 38C (100.4F), Mild Pain (1 - 3)  albuterol    90 MICROgram(s) HFA Inhaler 2 Puff(s) Inhalation every 6 hours PRN Shortness of Breath and/or Wheezing  aluminum hydroxide/magnesium hydroxide/simethicone Suspension 30 milliLiter(s) Oral every 4 hours PRN Dyspepsia  benzonatate 100 milliGRAM(s) Oral every 8 hours PRN Cough  melatonin 3 milliGRAM(s) Oral at bedtime PRN Insomnia  ondansetron Injectable 4 milliGRAM(s) IV Push every 8 hours PRN Nausea and/or Vomiting  oxyCODONE    IR 10 milliGRAM(s) Oral every 4 hours PRN Severe Pain (7 - 10)    ALLERGIES:  No Known Allergies    T(C): 36.6 (22 @ 07:28), Max: 36.8 (22 @ 18:25)  HR: 106 (22 @ 07:28) (100 - 111)  BP: 146/73 (22 @ 07:28) (121/75 - 165/79)  RR: 20 (22 @ 07:28) (18 - 22)  SpO2: 91% (22 @ 07:28) (90% - 93%)      PHYSICAL EXAM:    Constitutional: No acute distress, On 3L O2  Neuro: Awake alert, oriented to person, place and situation, non-focal, speech clear and intact  HEENT: PERRL, anicteric sclerae  Neck: supple, no JVD  CV: regular rate, regular rhythm, +S1S2,   Pulm/chest: lung sounds diminished bilaterally, no accessory muscle use noted  Abd: soft, NT, ND, +BS  Rectal tube: Light brown stool   Ext:  no Cyanosis, clubbing, + RLE swelling  Skin: warm, no jaundice   Psych: calm, appropriate affect        LABS:             10.6   13.14 )-----------( 510      (  @ 07:06 )             32.4                10.3   14.78 )-----------( 544      (  @ 19:57 )             31.4       PT/INR - ( 18 Dec 2022 19:57 )   PT: 15.2 sec;   INR: 1.31 ratio         PTT - ( 18 Dec 2022 19:57 )  PTT:30.8 sec      136  |  98  |  19.4  ----------------------------<  119<H>  3.3<L>   |  21.0<L>  |  0.65    Ca    8.6      19 Dec 2022 07:06  Mg     2.5         TPro  6.8  /  Alb  3.1<L>  /  TBili  0.5  /  DBili  x   /  AST  33  /  ALT  10  /  AlkPhos  195<H>      LIVER FUNCTIONS - ( 18 Dec 2022 19:57 )  Alb: 3.1 g/dL / Pro: 6.8 g/dL / ALK PHOS: 195 U/L / ALT: 10 U/L / AST: 33 U/L / GGT: x             Lipase, Serum: 11 U/L (22 @ 19:57)      Urinalysis Basic - ( 19 Dec 2022 06:50 )    Color: Yellow / Appearance: Clear / S.010 / pH: x  Gluc: x / Ketone: Large  / Bili: Negative / Urobili: Negative mg/dL   Blood: x / Protein: 30 mg/dL / Nitrite: Negative   Leuk Esterase: Negative / RBC: 0-2 /HPF / WBC 0-2 /HPF   Sq Epi: x / Non Sq Epi: Occasional / Bacteria: Occasional      < from: CT Angio Chest PE Protocol w/ IV Cont (22 @ 02:24) >    FINDINGS:  TUBES/LINES: None    LUNGS, PLEURA, AND AIRWAYS:  Innumerable bilateral pulmonary nodules are   not significant changefrom 2022.  A small right pleural effusion   and compressive atelectasis the right lower lobe are new from 2022.    VASCULATURE: Within normal limits.  No pulmonary embolism.  No thoracic   aortic aneurysm or dissection.    HEART: Heart size is normal.  No pericardial effusion.    MEDIASTINUM AND SCOTT: There is mild mediastinal and bilateral hilar   lymphadenopathy that appears increased from 2022.  For example a   right lower paratracheal lymph node measures 1.6 cm, increased from 1.1   cm previously.  A subcarinal lymph node measures 1.1 cm short axis   (7:75), increased from 6 mm previously.  A left hilar lymph node measures   9 mm short axis (7:64), increased from 6 mm previously.    BONES AND SOFT TISSUES: Diffuse osseous metastatic disease in the   visualized spine, ribs, right scapula, right humerus and left scapula is   grossly stable.  Multiple pathologic left rib fractures were present   previously.  A mild compression fracture at T2 and a moderate compression   fracture at T4 are stable.  No new fracture is identified.    VISUALIZED NECK AND ABDOMEN: Unremarkable.      AI VERTEBRAL BODY ANALYSIS:  T4: Moderate compression fracture with 38% height loss.    IMPRESSION:    No pulmonary embolus.    A small right pleural effusion and compressive atelectasis in the right   lower lobe are new from 2022.    There is mediastinal and hilar lymphadenopathy that appears increased   from 2022    Innumerable pulmonary nodules and diffuse osseous metastatic disease is   unchanged from 2022.    VERTEBRAL BODY ANALYSIS: Vertebral compression fractures as described,   consider further workup for osteoporosis.    < end of copied text >

## 2022-12-19 NOTE — CONSULT NOTE ADULT - NS ATTEND AMEND GEN_ALL_CORE FT
Patient seen and examined with NP    Pt came to Er with " major coughing " and Right LE swelling .  States he had a 'Coughing spell " last night, then vomited dark material   NO melena .  No abdominal pain  Denying heartburn or reflux  to me , but as per Er chart, pt admits to reflux. Does not take NSAIDS at home- takes Tylenol and Oxy codone.   Light brown stool on rectal exam  hemoglobin stable 10.6    A/P  Pt does not appear to be having GI bleed  Hgb stable  Protonix qd ? GERD  will not actively follow

## 2022-12-19 NOTE — PROGRESS NOTE ADULT - ASSESSMENT
71 yo male with pmhx Prostate Cancer with mets to the bone, possibly to the lung , complicated by urinary retention requiring intermittent straight cath presenting to the ED with SOB and LE swelling.     Acute Hypoxic Respiratory Failure, 2/2 ?CAP vs. Progression of METs with Pulmonary nodules and Lymphadenopathy  -CTA showing increased Hilar Lymphadenopathy from imaging 2 weeks ago as well as a new small pleural effusions/compressive atelectasis  - F/u TTE to assess for dyspnea  - Follow up blood cultures, Urine Legionella and Strep, Urine culture.   - Will c/w AZT/Cefepime D2  -Appreciate heme/onc recs- Will obtain CT A/P to assess for disease progression. Will possibly need palliative c/s depending on findings  -F/u pulm recs  -ID c/s placed    Hematemesis  -Baseline Hg- 10-11  -CTA of chest without active bleeding  -Appreciate GI recs- will c/w PO Protonix. No endoscopic intervention. Stable H/H.    RLE swelling  - US duplex- 4.6 x 3.6 x 4.8 cm heterogeneous mass in the right groin. 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    Unable to reach patient's brotherDonny ([phone]) on 12/19.

## 2022-12-19 NOTE — H&P ADULT - NSHPLABSRESULTS_GEN_ALL_CORE
CARDIAC MARKERS ( 18 Dec 2022 19:57 )  x     / <0.01 ng/mL / x     / x     / x                                10.3   14.78 )-----------( 544      ( 18 Dec 2022 19:57 )             31.4     18 Dec 2022 19:57    134    |  96     |  21.7   ----------------------------<  104    3.8     |  21.0   |  0.70     Ca    8.9        18 Dec 2022 19:57  Mg     2.5       18 Dec 2022 19:57    TPro  6.8    /  Alb  3.1    /  TBili  0.5    /  DBili  x      /  AST  33     /  ALT  10     /  AlkPhos  195    18 Dec 2022 19:57    PT/INR - ( 18 Dec 2022 19:57 )   PT: 15.2 sec;   INR: 1.31 ratio         PTT - ( 18 Dec 2022 19:57 )  PTT:30.8 sec  CAPILLARY BLOOD GLUCOSE        LIVER FUNCTIONS - ( 18 Dec 2022 19:57 )< from: CT Angio Chest PE Protocol w/ IV Cont (12.19.22 @ 02:24) >        Alb: 3.1 g/dL / Pro: 6.8 g/dL / ALK PHOS: 195 U/L / ALT: 10 U/L / AST: 33 U/L / GGT: x    IMPRESSION:    No pulmonary embolus.    A small right pleural effusion and compressive atelectasis in the right   lower lobe are new from 12/07/2022.    There is mediastinal and hilar lymphadenopathy that appears increased   from 12/07/2022    Innumerable pulmonary nodules and diffuse osseous metastatic disease is   unchanged from 12/07/2022.    VERTEBRAL BODY ANALYSIS: Vertebral compression fractures as described,   consider further workup for osteoporosis.        --- End of Report ---    < end of copied text >

## 2022-12-19 NOTE — H&P ADULT - ASSESSMENT
73 yo male with pmhx Prostate Cancer with mets to the bone, possibly to the lung , complicated by urinary retention requiring intermittent straight cath presenting to the ED with SOB and LE swelling.     Acute Hypoxic Respiratory Failure, Sepsis 2/2 ?likely CAP  -Admit to medicine,   -SIRs criteria met: WBC 14k,    -Start Cefepime plus Azithro for gram negative and atypical coverage in immunocompromised patient  -Follow up blood cultures, Urine Legionella and Strep, Urine culture  -Patient grew enterococcus in urine on previous admission but was not treated due to lack of symptoms  -Chest PT, Incentive Spirometry  -Supplemental O2 to maintain SpO2 88-92%, avoid hyperoxia  -Follow up routine labs CBC, BMP    Pulmonary nodules  - Likely sec metastatic disease vs secondary malignancy   -Tessalon Perles as needed  - CTA chest reviewed and noted above   - Will need repeat CT chest in 3 months  - Oncology following and recs noted   - Will refer to pulm on discharge     Normocytic anemia   -Likely 2/2 cancer   -H/H fluctuating but hemodynamically stable   - No active bleeding reported   - Monitor CBC and transfuse as needed     DVTppx: Lovenox  GOC:  73 yo male with pmhx Prostate Cancer with mets to the bone, possibly to the lung , complicated by urinary retention requiring intermittent straight cath presenting to the ED with SOB and LE swelling.     Acute Hypoxic Respiratory Failure, 2/2 ?CAP vs. Progression of METs with Pulmonary nodules and Lymphadenopathy  -Admit to medicine,   -SIRs criteria met: WBC 14k,    -CTA showing increased Hilar Lymphadenopathy from imaging 2 weeks ago as well as a new small pleural effusions/compressive atelectasis  -Check TTE to r/o cardiogenic cause of pleural effusion/dyspnea  -Start Cefepime plus Azithro for gram negative and atypical coverage in immunocompromised patient  -Follow up blood cultures, Urine Legionella and Strep, Urine culture  -Patient grew enterococcus in urine on previous admission but was not treated due to lack of symptoms; will repeat UA/Urine Cx  -Chest PT, Incentive Spirometry  -Supplemental O2 to maintain SpO2 88-92%, avoid hyperoxia  -Follow up routine labs CBC, BMP  -Consulted Onc and Pulm, follow up recs    Hematemesis  -Repeat CBC pending  -CTA of chest without active bleeding  -Start Protonix 40 IV BID  -Keep NPO for now  -GI consulted for AM    Normocytic anemia   -Likely of chronic disease 2/2 cancer   -Currently at baseline   -Monitor CBC and transfuse as needed     Metastatic Prostate Cancer  -Currently undergoing chemo with Tim  -Continue Oxycodone for pain control  -Continue Tamsulosin, Finasteride  -Onc consulted for am    DVTppx: Hold given episode of hematemesis   GOC:  73 yo male with pmhx Prostate Cancer with mets to the bone, possibly to the lung , complicated by urinary retention requiring intermittent straight cath presenting to the ED with SOB and LE swelling.     Acute Hypoxic Respiratory Failure, 2/2 ?CAP vs. Progression of METs with Pulmonary nodules and Lymphadenopathy  -Admit to medicine,   -SIRs criteria met: WBC 14k,    -CTA showing increased Hilar Lymphadenopathy from imaging 2 weeks ago as well as a new small pleural effusions/compressive atelectasis  -Check TTE to r/o cardiogenic cause of pleural effusion/dyspnea  -Start Cefepime plus Azithro for gram negative and atypical coverage in immunocompromised patient  -Follow up blood cultures, Urine Legionella and Strep, Urine culture  -Patient grew enterococcus in urine on previous admission but was not treated due to lack of symptoms; will repeat UA/Urine Cx  -Chest PT, Incentive Spirometry  -Supplemental O2 to maintain SpO2 88-92%, avoid hyperoxia  -Follow up routine labs CBC, BMP  -Consulted Onc and Pulm, follow up recs    Hematemesis  -Repeat CBC pending  -CTA of chest without active bleeding  -Start Protonix 40 IV BID  -Keep NPO for now  -GI consulted for AM    Normocytic anemia   -Likely of chronic disease 2/2 cancer   -Currently at baseline   -Monitor CBC and transfuse as needed for Hgb < 7.0    Metastatic Prostate Cancer  -Currently undergoing chemo with Tim  -Continue Oxycodone for pain control  -Continue Tamsulosin, Finasteride  -Onc consulted for am    DVTppx: Hold given episode of hematemesis   GOC: DNR, Trial of Intubation; MOLST filled out with ED Resident Dami Camacho and I confirmed with patient personally

## 2022-12-19 NOTE — CONSULT NOTE ADULT - ASSESSMENT
FULL CONSULT TO FOLLOW    Patient at the EOL, requesting hospice care  No role for abx at this point and they are unlikely to change outcome or provide relief.     Consult Hospice     D/w Dr. Greco, Pulmonary, Palliative, patient and wife  71 yo male with pmhx with metastatic prostate cancer to bone and lung (innumerable pulmonary mets), presents to the ED on 12/19/2022 with worsening SOB and RLE swelling (has groin mass). Upon admission, patient hypoxemic requiring supplemental oxygen by NC 3 L/min, afebrile, with WBC 14.8. Repeat CT demonstrated new small right pleural effusion with compressive RLL atelectasis, innumerable lungs nodules and worsening lymphadenopathy.     ID consulted for possible Pneumoniae. Patient started on empiric azithromycin and cefepime.     Impression:  Metastatic prostate cancer  End of life  Pulmonary mets   Leukocytosis    Plan:   Patient at the EOL,  Patient and wife requesting hospice care  No role for abx at this point as they are unlikely to provide relief or change outcome  Would discontinue unnecessary medications and/or diagnostic tests and focus on symptom management   Consult Hospice     D/w Dr. Greco, Pulmonary, Palliative, patient and wife     Please call with questions

## 2022-12-19 NOTE — ED ADULT NURSE REASSESSMENT NOTE - NS ED NURSE REASSESS COMMENT FT1
Pt vomited to a coffee ground vomitus - MD aware. PRN Zofran given as ordered. Assisted pt to change to hospital gown. Will continue to monitor pt.

## 2022-12-19 NOTE — CONSULT NOTE ADULT - SUBJECTIVE AND OBJECTIVE BOX
[facility]  INFECTIOUS DISEASES at [place] and [place]  =====================================================                               Jorge Rivera MD*    Shala Salas MD*                             Gem Colin MD*     Ruth Rock MD*            Diplomates American Board of Internal Medicine & Infectious Diseases                * [place] Office - Appt - Tel  [phone] Fax [phone]                * [place] Office - Appt - Tel [phone] Fax [phone]                                  Hospital Consult line:  [phone]  =====================================================      [facility]-[id number]  JACK UMANZOR        CC: Patient is a 72y old  Male who presents with a chief complaint of Acute hypoxic respiratory failure 2/2 likely PNA (19 Dec 2022 14:33)      72y  Male     HPI:  73 yo male with pmhx Prostate Cancer with mets to the bone, recently to the lung, complicated by urinary retention requiring intermittent straight cath presenting to the ED with SOB and LE swelling. Patient reports a cough that he has had for the past 1.5 months but the worsening SOB started only over the past week. He has also noted RLE swelling. He was recently admitted here for a R groin mass which was biopsied during that admission but he has not yet gotten the results.     Just prior to my examination, patient had an episode of hematemesis. When questioned about this, patient states he has not had this before. However, he states that he has had reflux symptoms all day today. He denies abdominal pain, denies that he has reflux symptoms or gastritis in the past.  (19 Dec 2022 05:00)    ______________________________________________________  PAST MEDICAL & SURGICAL HISTORY:  Prostate cancer      S/P appendectomy          Social History:  Denies tobacco, alcohol, or illicit drug use (19 Dec 2022 05:00)    Occupation:  Travel:  Pets:  Drugs:  Alcohol:     FAMILY HISTORY:  FH: lung cancer (Sibling)        ______________________________________________________  Allergies    No Known Allergies    Intolerances        ______________________________________________________  MEDICATIONS:  Antibiotics:  azithromycin  IVPB      cefepime  Injectable. 2000 milliGRAM(s) IV Push every 8 hours    Other medications:  finasteride 5 milliGRAM(s) Oral daily  guaiFENesin Oral Liquid (Sugar-Free) 100 milliGRAM(s) Oral every 4 hours  oxyCODONE  ER Tablet 20 milliGRAM(s) Oral every 12 hours  pantoprazole    Tablet 40 milliGRAM(s) Oral before breakfast  potassium chloride  10 mEq/100 mL IVPB 10 milliEquivalent(s) IV Intermittent every 1 hour  predniSONE   Tablet 5 milliGRAM(s) Oral daily  tamsulosin 0.4 milliGRAM(s) Oral at bedtime    ______________________________________________________  REVIEW OF SYSTEMS:  CONSTITUTIONAL:  No fever or chills  HEENT:  No diplopia or blurred vision.  No earache, sore throat or runny nose.  CARDIOVASCULAR:  No chest pain  RESPIRATORY:  No cough, shortness of breath  GASTROINTESTINAL:  No nausea, vomiting, abdominal pain or diarrhea.  GENITOURINARY:  No dysuria, frequency or urgency. No blood in urine  MUSCULOSKELETAL:  no joint aches, no muscle pain  SKIN:  No change in skin, hair or nails.  NEUROLOGIC:  No headaches, seizures  PSYCHIATRIC:  No disorder of thought or mood.  ENDOCRINE:  No heat or cold intolerance  HEMATOLOGICAL:  No easy bruising or bleeding.     _____________________________________________________  PHYSICAL EXAM:  GEN: awake, alert, oriented x 3. Lying comfortable in bed, in no acute distress   HEENT: normocephalic and atraumatic. EOMI. PERRL.  Anicteric sclerae. Moist mucous membranes. No mucosal lesions. No nasal discharge.   NECK: Supple. No palpable neck masses or LN  LUNGS: eupneic, CTA B/L, no adventitious sounds  HEART: RRR, no m/r/g  ABDOMEN: Soft, NT, ND, no hepatosplenomegally, no palpable masses.  +BS.    : No CVA tenderness, no Fontana catheter  EXTREMITIES: well perfused, without  edema.  MSK: No joint deformity or swelling  LYMPH: no palpable cervical, supraclavicular, axillary or inguinal lymph nodes  NEUROLOGIC: Grossly no focal deficits   PSYCHIATRIC: Appropriate affect and mood.  SKIN: No rash, wounds or jaundice  LINES: no central lines, only peripheral access c/d/i    ______________________________________________________  Height (cm): 180.3 (12-18 @ 18:25)  Weight (kg): 77.1 (12-18 @ 18:25)  BMI (kg/m2): 23.7 (12-18 @ 18:25)  BSA (m2): 1.97 (12-18 @ 18:25)    Vitals:  T(F): 97.8 (19 Dec 2022 07:28), Max: 98.3 (18 Dec 2022 18:25)  HR: 106 (19 Dec 2022 07:28)  BP: 146/73 (19 Dec 2022 07:28)  RR: 20 (19 Dec 2022 07:28)  SpO2: 91% (19 Dec 2022 07:28) (90% - 93%)  temp max in last 48H T(F): , Max: 98.3 (12-18-22 @ 18:25)    Current Antibiotics:  azithromycin  IVPB      cefepime  Injectable. 2000 milliGRAM(s) IV Push every 8 hours    Other medications:  finasteride 5 milliGRAM(s) Oral daily  guaiFENesin Oral Liquid (Sugar-Free) 100 milliGRAM(s) Oral every 4 hours  oxyCODONE  ER Tablet 20 milliGRAM(s) Oral every 12 hours  pantoprazole    Tablet 40 milliGRAM(s) Oral before breakfast  potassium chloride  10 mEq/100 mL IVPB 10 milliEquivalent(s) IV Intermittent every 1 hour  predniSONE   Tablet 5 milliGRAM(s) Oral daily  tamsulosin 0.4 milliGRAM(s) Oral at bedtime                            10.6   13.14 )-----------( 510      ( 19 Dec 2022 07:06 )             32.4     12-19    136  |  98  |  19.4  ----------------------------<  119<H>  3.3<L>   |  21.0<L>  |  0.65    Ca    8.6      19 Dec 2022 07:06  Mg     2.5     12-18    TPro  6.8  /  Alb  3.1<L>  /  TBili  0.5  /  DBili  x   /  AST  33  /  ALT  10  /  AlkPhos  195<H>  12-18    RECENT CULTURES:  12-18 @ 19:57          [facility]  12-07 @ 12:45 Clean Catch Clean Catch (Midstream) Enterococcus faecalis    >100,000 CFU/ml Enterococcus faecalis        12-07 @ 11:55          [facility]      WBC Count: 13.14 K/uL (12-19-22 @ 07:06)  WBC Count: 14.78 K/uL (12-18-22 @ 19:57)    Creatinine, Serum: 0.65 mg/dL (12-19-22 @ 07:06)  Creatinine, Serum: 0.70 mg/dL (12-18-22 @ 19:57)        Procalcitonin, Serum: 0.67 ng/mL (12-07-22 @ 11:55)     SARS-CoV-2: [facility] (12-18-22 @ 19:57)  SARS-CoV-2: [facility] (12-07-22 @ 11:55)    ______________________________________________________  RADIOLOGY [facility]  INFECTIOUS DISEASES at [place] and [place]  =====================================================                               Jorge Rivera MD*    Shala Salas MD*                             Gem Colin MD*     Ruth Rock MD*            Diplomates American Board of Internal Medicine & Infectious Diseases                * [place] Office - Appt - Tel  [phone] Fax [phone]                * [place] Office - Appt - Tel [phone] Fax [phone]                                  Hospital Consult line:  [phone]  =====================================================      [id number]  JACK UMANZOR        CC: Patient is a 72y old  Male who presents with a chief complaint of shortness of breath     HPI:  73 yo male with pmhx with metastatic prostate cancer to bone and lung (innumerable pulmonary mets), presents to the ED on 12/19/2022 with worsening SOB and RLE swelling (has groin mass). Upon admission, patient hypoxemic requiring supplemental oxygen by NC 3 L/min, afebrile, with WBC 14.8. Repeat CT demonstrated new small right pleural effusion with compressive RLL atelectasis, innumerable lungs nodules and worsening lymphadenopathy.     ID consulted for possible Pneumoniae. Patient started on empiric azithromycin and cefepime.     On my exam, patient is on a stretcher by the CDU hallway accompanied by his wife. After introducing myself, patient expressed his wish to be on hospice, acknowledging that his life is ending. Wife also expressed their desire to proceed with hospice, and allow him a peaceful death.     Patient admits to pain and dyspnea.     ______________________________________________________  PAST MEDICAL & SURGICAL HISTORY:  Metastatic prostate cancer  S/P appendectomy      Social History:  Denies tobacco, alcohol, or illicit drug use (    FAMILY HISTORY:  FH: lung cancer (Sibling)    ______________________________________________________  Allergies    No Known Allergies    Intolerances        ______________________________________________________  MEDICATIONS:  Antibiotics:  azithromycin  IVPB      cefepime  Injectable. 2000 milliGRAM(s) IV Push every 8 hours    Other medications:  finasteride 5 milliGRAM(s) Oral daily  guaiFENesin Oral Liquid (Sugar-Free) 100 milliGRAM(s) Oral every 4 hours  oxyCODONE  ER Tablet 20 milliGRAM(s) Oral every 12 hours  pantoprazole    Tablet 40 milliGRAM(s) Oral before breakfast  potassium chloride  10 mEq/100 mL IVPB 10 milliEquivalent(s) IV Intermittent every 1 hour  predniSONE   Tablet 5 milliGRAM(s) Oral daily  tamsulosin 0.4 milliGRAM(s) Oral at bedtime    ______________________________________________________  REVIEW OF SYSTEMS:  CONSTITUTIONAL:  as per HPI   HEENT:  No diplopia or blurred vision.  No earache, sore throat or runny nose.  CARDIOVASCULAR:  No chest pain  RESPIRATORY:  as per HPI   GASTROINTESTINAL:  No nausea, vomiting, abdominal pain or diarrhea.  GENITOURINARY:  No dysuria, frequency or urgency. No blood in urine  MUSCULOSKELETAL:  as per HPI   SKIN:  No change in skin, hair or nails.  NEUROLOGIC:  No headaches, seizures  PSYCHIATRIC:  No disorder of thought or mood.  ENDOCRINE:  No heat or cold intolerance  HEMATOLOGICAL:  No easy bruising or bleeding.     _____________________________________________________  PHYSICAL EXAM:  GEN: elderly man, chronically ill appearing but in NAD  HEENT: normocephalic and atraumatic.Anicteric sclerae. Moist mucous membranes. No mucosal lesions. No nasal discharge.   NECK: Supple. No palpable neck masses or LN  LUNGS: mild dyspnea, particularly during conversation. No resp distress.   HEART: RRR, no m/r/g  ABDOMEN: Soft, NT, ND,   : no Fontana catheter  EXTREMITIES: RLE swelling   NEUROLOGIC: Grossly no focal deficits   PSYCHIATRIC: Appropriate affect and mood.  SKIN: No rash, wounds or jaundice  LINES: PIV    ______________________________________________________  Height (cm): 180.3 (12-18 @ 18:25)  Weight (kg): 77.1 (12-18 @ 18:25)  BMI (kg/m2): 23.7 (12-18 @ 18:25)  BSA (m2): 1.97 (12-18 @ 18:25)    Vitals:  T(F): 97.8 (19 Dec 2022 07:28), Max: 98.3 (18 Dec 2022 18:25)  HR: 106 (19 Dec 2022 07:28)  BP: 146/73 (19 Dec 2022 07:28)  RR: 20 (19 Dec 2022 07:28)  SpO2: 91% (19 Dec 2022 07:28) (90% - 93%)  temp max in last 48H T(F): , Max: 98.3 (12-18-22 @ 18:25)    Current Antibiotics:  azithromycin  IVPB      cefepime  Injectable. 2000 milliGRAM(s) IV Push every 8 hours    Other medications:  finasteride 5 milliGRAM(s) Oral daily  guaiFENesin Oral Liquid (Sugar-Free) 100 milliGRAM(s) Oral every 4 hours  oxyCODONE  ER Tablet 20 milliGRAM(s) Oral every 12 hours  pantoprazole    Tablet 40 milliGRAM(s) Oral before breakfast  potassium chloride  10 mEq/100 mL IVPB 10 milliEquivalent(s) IV Intermittent every 1 hour  predniSONE   Tablet 5 milliGRAM(s) Oral daily  tamsulosin 0.4 milliGRAM(s) Oral at bedtime                            10.6   13.14 )-----------( 510      ( 19 Dec 2022 07:06 )             32.4     12-19    136  |  98  |  19.4  ----------------------------<  119<H>  3.3<L>   |  21.0<L>  |  0.65    Ca    8.6      19 Dec 2022 07:06  Mg     2.5     12-18    TPro  6.8  /  Alb  3.1<L>  /  TBili  0.5  /  DBili  x   /  AST  33  /  ALT  10  /  AlkPhos  195<H>  12-18    RECENT CULTURES:  12-18 @ 19:57    RVP with SARS-CoV-2   NotDetec    12-07 @ 12:45 Clean Catch Clean Catch (Midstream) Enterococcus faecalis    >100,000 CFU/ml Enterococcus faecalis    12-07 @ 11:55    RVP with SARS-CoV-2   NotDetec      WBC Count: 13.14 K/uL (12-19-22 @ 07:06)  WBC Count: 14.78 K/uL (12-18-22 @ 19:57)    Creatinine, Serum: 0.65 mg/dL (12-19-22 @ 07:06)  Creatinine, Serum: 0.70 mg/dL (12-18-22 @ 19:57)    Procalcitonin, Serum: 0.67 ng/mL (12-07-22 @ 11:55)     SARS-CoV-2: NotDetec (12-18-22 @ 19:57)  SARS-CoV-2: NotDetec (12-07-22 @ 11:55)    ______________________________________________________  RADIOLOGY  < from: CT Angio Chest PE Protocol w/ IV Cont (12.19.22 @ 02:24) >  FINDINGS:  TUBES/LINES: None    LUNGS, PLEURA, AND AIRWAYS:  Innumerable bilateral pulmonary nodules are   not significant changefrom 12/07/2022.  A small right pleural effusion   and compressive atelectasis the right lower lobe are new from 12/07/2022.    VASCULATURE: Within normal limits.  No pulmonary embolism.  No thoracic   aortic aneurysm or dissection.    HEART: Heart size is normal.  No pericardial effusion.    MEDIASTINUM AND SCOTT: There is mild mediastinal and bilateral hilar   lymphadenopathy that appears increased from 12/07/2022.  For example a   right lower paratracheal lymph node measures 1.6 cm, increased from 1.1   cm previously.  A subcarinal lymph node measures 1.1 cm short axis   (7:75), increased from 6 mm previously.  A left hilar lymph node measures   9 mm short axis (7:64), increased from 6 mm previously.    BONES AND SOFT TISSUES: Diffuse osseous metastatic disease in the   visualized spine, ribs, right scapula, right humerus and left scapula is   grossly stable.  Multiple pathologic left rib fractures were present   previously.  A mild compression fracture at T2 and a moderate compression   fracture at T4 are stable.  No new fracture is identified.    VISUALIZED NECK AND ABDOMEN: Unremarkable.      AI VERTEBRAL BODY ANALYSIS:  T4: Moderate compression fracture with 38% height loss.    IMPRESSION:    No pulmonary embolus.    A small right pleural effusion and compressive atelectasis in the right   lower lobe are new from 12/07/2022.    There is mediastinal and hilar lymphadenopathy that appears increased   from 12/07/2022    Innumerable pulmonary nodules and diffuse osseous metastatic disease is   unchanged from 12/07/2022.    < end of copied text >    < from: US Duplex Venous Lower Ext Complete, Bilateral (12.18.22 @ 23:55) >  FINDINGS:    RIGHT:  4.6 x 3.6 x 4.8 cm heterogeneous mass in the right groin. Common femoral   vein/greater saphenous vein are not well evaluated due to patient's   inability to tolerate compression in this region.  Normal compressibility of the RIGHT femoral and popliteal veins.  Doppler examination shows normal spontaneous and phasic flow.  NoRIGHT calf vein thrombosis is detected.    LEFT:  Normal compressibility of the LEFT common femoral, femoral and popliteal   veins.  Doppler examination shows normal spontaneous and phasic flow.  No LEFT calf vein thrombosis is detected.    IMPRESSION:  No evidence of deep venous thrombosis in either lower extremity noting   that the right common femoral vein/greater saphenous vein are not well   evaluated due to adjacent groin mass.    < end of copied text >

## 2022-12-19 NOTE — CONSULT NOTE ADULT - ASSESSMENT
Progressive dyspnea and cough in the setting of metastatic prostate Ca with diffuse lung metastasis  Patient seems to be failing first line of chemotherapy with guarded prognosis   Recommend palliative care consultation for GOC and for symptomatic    No clinical sign of infection . Doubt benefit of IV antibiotics   Please recall Pulmonary for any questions or concerns .   Progressive dyspnea and cough in the setting of metastatic prostate Ca with diffuse lung metastasis  Patient seems to be failing first line of chemotherapy with guarded prognosis   Recommend palliative care consultation for GOC and for symptomatic treatment for pain and dyspnea  No clinical sign of infection . Doubt benefit of IV antibiotics   Please recall Pulmonary for any questions or concerns .

## 2022-12-19 NOTE — H&P ADULT - HISTORY OF PRESENT ILLNESS
71 yo male with pmhx Prostate Cancer with mets to the bone, possibly to the lung , complicated by urinary retention requiring intermittent straight cath presenting to the ED with SOB and LE swelling.  71 yo male with pmhx Prostate Cancer with mets to the bone, recently to the lung, complicated by urinary retention requiring intermittent straight cath presenting to the ED with SOB and LE swelling. Patient reports a cough that he has had for the past 1.5 months but the worsening SOB started only over the past week. He has also noted RLE swelling. He was recently admitted here for a R groin mass which was biopsied during that admission but he has not yet gotten the results.     Just prior to my examination, patient had an episode of hematemesis. When questioned about this, patient states he has not had this before. However, he states that he has had reflux symptoms all day today. He denies abdominal pain, denies that he has reflux symptoms or gastritis in the past.

## 2022-12-19 NOTE — H&P ADULT - NSHPPHYSICALEXAM_GEN_ALL_CORE
Vital Signs Last 24 Hrs  T(C): 36.7 (18 Dec 2022 19:46), Max: 36.8 (18 Dec 2022 18:25)  T(F): 98 (18 Dec 2022 19:46), Max: 98.3 (18 Dec 2022 18:25)  HR: 105 (19 Dec 2022 00:17) (100 - 111)  BP: 165/79 (19 Dec 2022 00:17) (121/75 - 165/79)  BP(mean): --  RR: 18 (19 Dec 2022 00:17) (18 - 22)  SpO2: 92% (19 Dec 2022 00:17) (90% - 93%)    Parameters below as of 19 Dec 2022 00:17  Patient On (Oxygen Delivery Method): nasal cannula  O2 Flow (L/min): 3      General: Age-appearing, in no acute distress  Head: Normocephalic, atraumatic  ENMT: EOMI, neck supple  Cardiovascular: +S1, S2; Regular rate and rhythm, no murmurs, rubs, gallops  Respiratory: CTA BL, no wheezes, rales, rhonchi  Gastrointestinal: Abdomen soft, non-tender, +BS in all 4 quadrants  Extremities: No clubbing, cyanosis, or edema  Vascular: 2+ pulses, cap refill < 2 seconds  Neuro: Non-focal, AAOx4, sensation intact BL  Musculoskeletal: Normal tone, no deformities  Skin: Warm, dry; no acute rash seen  Psych: Appropriate, cooperative Vital Signs Last 24 Hrs  T(C): 36.7 (18 Dec 2022 19:46), Max: 36.8 (18 Dec 2022 18:25)  T(F): 98 (18 Dec 2022 19:46), Max: 98.3 (18 Dec 2022 18:25)  HR: 105 (19 Dec 2022 00:17) (100 - 111)  BP: 165/79 (19 Dec 2022 00:17) (121/75 - 165/79)  BP(mean): --  RR: 18 (19 Dec 2022 00:17) (18 - 22)  SpO2: 92% (19 Dec 2022 00:17) (90% - 93%)    Parameters below as of 19 Dec 2022 00:17  Patient On (Oxygen Delivery Method): nasal cannula  O2 Flow (L/min): 3    General: Age-appearing, in no acute distress  Head: Normocephalic, atraumatic  ENMT: EOMI, neck supple  Cardiovascular: +S1, S2; Regular rate and rhythm, no murmurs, rubs, gallops; RLE 3+ pitting edema, LLE no edema  Respiratory: CTA BL, no wheezes, rales, rhonchi  Gastrointestinal: Abdomen soft, non-tender, +BS in all 4 quadrants  Extremities: No clubbing, cyanosis  Vascular: 2+ pulses, cap refill < 2 seconds  Neuro: Non-focal, AAOx4, sensation intact BL  Musculoskeletal: Normal tone, no deformities  Skin: Warm, dry; no acute rash seen  Psych: Appropriate, cooperative Vital Signs Last 24 Hrs  T(C): 36.7 (18 Dec 2022 19:46), Max: 36.8 (18 Dec 2022 18:25)  T(F): 98 (18 Dec 2022 19:46), Max: 98.3 (18 Dec 2022 18:25)  HR: 105 (19 Dec 2022 00:17) (100 - 111)  BP: 165/79 (19 Dec 2022 00:17) (121/75 - 165/79)  BP(mean): --  RR: 18 (19 Dec 2022 00:17) (18 - 22)  SpO2: 92% (19 Dec 2022 00:17) (90% - 93%)    Parameters below as of 19 Dec 2022 00:17  Patient On (Oxygen Delivery Method): nasal cannula  O2 Flow (L/min): 3    General: Age-appearing, in no acute distress  Head: Normocephalic, atraumatic  ENMT: EOMI, neck supple  Cardiovascular: +S1, S2; Regular rate and rhythm, no murmurs, rubs, gallops; RLE 3+ pitting edema, LLE no edema  Respiratory: CTA BL, no wheezes, rales, rhonchi  Gastrointestinal: Abdomen soft, non-tender, +BS in all 4 quadrants  : Groin biopsy site clean, dry; no drainage or erythema   Extremities: No clubbing, cyanosis  Vascular: 2+ pulses, cap refill < 2 seconds  Neuro: Non-focal, AAOx4, sensation intact BL  Musculoskeletal: Normal tone, no deformities  Skin: Warm, dry; no acute rash seen  Psych: Appropriate, cooperative

## 2022-12-19 NOTE — CONSULT NOTE ADULT - PROBLEM SELECTOR RECOMMENDATION 9
Less likely hemoptysis given recent diagnosis of metastatic lung disease, CTA chest with innumerable lung nodules. Patient reports "cough up dark stuff". No evidence of overt GI bleed. GABINO with  light color stool, no melena.  His hemoglobin in ED 10.3 -->10.6 (baseline 10 to 11 gm).   Continue Protonix  Trend CBC, transfuse to hgb 8or higher  Avoid NSAIDs.   No indication for endoscopic evaluation at this time.

## 2022-12-19 NOTE — CONSULT NOTE ADULT - ASSESSMENT
73 yo male with pmhx Prostate Cancer with mets to the bone, recently to the lung, complicated by urinary retention requiring intermittent straight cath presenting to the ED with SOB and right lower extremity swelling. GI consult for hematemesis.

## 2022-12-20 NOTE — CONSULT NOTE ADULT - SUBJECTIVE AND OBJECTIVE BOX
REASON FOR CONSULTATION    HPI:  73 yo male with pmhx Prostate Cancer with mets to the bone, recently to the lung, complicated by urinary retention requiring intermittent straight cath presenting to the ED with SOB and LE swelling. Patient reports a cough that he has had for the past 1.5 months but the worsening SOB started only over the past week. He has also noted RLE swelling. He was recently admitted here for a R groin mass which was biopsied during that admission but he has not yet gotten the results.     Just prior to my examination, patient had an episode of hematemesis. When questioned about this, patient states he has not had this before. However, he states that he has had reflux symptoms all day today. He denies abdominal pain, denies that he has reflux symptoms or gastritis in the past.  (19 Dec 2022 05:00)      REVIEW OF SYSTEMS:    REVIEW OF SYSTEMS:   [X All ROS addressed below are non-contributory, except:  Neuro: [ ] Headache [  ]Back pain [ ] Numbness [ ] Weakness [ ] Ataxia [ ] Dizziness [ ] Aphasia [ ] Dysarthria [ ] Visual disturbance  Resp: [ ] Shortness of breath/dyspnea, [ ] Orthopnea [ ] Cough  CV: [ ] Chest pain [ ] Palpitation [ ] Lightheadedness [ ] Syncope  Renal: [ ] Thirst [ ] Edema [ ] hematuria   GI: [ ] Nausea [ ] Emesis [ ] Abdominal pain [ ] Constipation [ ] Diarrhea  Hem: [ ] Hematemesis [ ] bright red blood per rectum  ID: [ ] Fever [ ] Chills [ ] Dysuria  ENT: [ ] Rhinorrhea  Neuro [ ] Numbness [ ] tingling   Psych: [ ] confusion     PAST MEDICAL & SURGICAL HISTORY:  Prostate cancer      S/P appendectomy          SOCIAL HISTORY:    FAMILY HISTORY:  FH: lung cancer (Sibling)        SOCIAL HISTORY:    Allergies    No Known Allergies    Intolerances        MEDICATIONS  (STANDING):  oxyCODONE  ER Tablet 20 milliGRAM(s) Oral every 12 hours    MEDICATIONS  (PRN):  albuterol    90 MICROgram(s) HFA Inhaler 2 Puff(s) Inhalation every 6 hours PRN Shortness of Breath and/or Wheezing  oxyCODONE    IR 10 milliGRAM(s) Oral every 4 hours PRN Severe Pain (7 - 10)      Vital Signs Last 24 Hrs  T(C): 36.7 (20 Dec 2022 08:22), Max: 36.7 (19 Dec 2022 20:05)  T(F): 98.1 (20 Dec 2022 08:22), Max: 98.1 (20 Dec 2022 00:33)  HR: 92 (20 Dec 2022 08:22) (92 - 118)  BP: 118/66 (20 Dec 2022 08:22) (118/66 - 142/73)  BP(mean): --  RR: 20 (20 Dec 2022 08:22) (20 - 20)  SpO2: 90% (20 Dec 2022 08:22) (90% - 91%)    Parameters below as of 20 Dec 2022 08:22  Patient On (Oxygen Delivery Method): nasal cannula  O2 Flow (L/min): 3      PHYSICAL EXAM:    GENERAL: NAD, well-groomed, well-developed  HEAD:  Atraumatic, Normocephalic  EYES: EOMI, PERRLA, conjunctiva and sclera clear  ENMT: No tonsillar erythema, exudates, or enlargement; Moist mucous membranes, Good dentition, No lesions  NECK: Supple, No JVD, Normal thyroid  NERVOUS SYSTEM:  Alert & Oriented X3, Good concentration; Motor Strength 5/5 B/L upper and lower extremities; DTRs 2+ intact and symmetric  CHEST/LUNG: Clear to percussion bilaterally; No rales, rhonchi, wheezing, or rubs  HEART: Regular rate and rhythm; No murmurs, rubs, or gallops  ABDOMEN: Soft, Nontender, Nondistended; Bowel sounds present  EXTREMITIES:  2+ Peripheral Pulses, No clubbing, cyanosis, or edema  LYMPH: No lymphadenopathy noted  SKIN: No rashes or lesions      LABS:                        10.5   14.47 )-----------( 520      ( 20 Dec 2022 03:38 )             33.4     12-20    136  |  100  |  16.8  ----------------------------<  85  3.6   |  21.0<L>  |  0.61    Ca    8.5      20 Dec 2022 03:38  Mg     2.5     12-18    TPro  6.5<L>  /  Alb  2.7<L>  /  TBili  0.5  /  DBili  x   /  AST  38  /  ALT  11  /  AlkPhos  182<H>  12-20    PT/INR - ( 18 Dec 2022 19:57 )   PT: 15.2 sec;   INR: 1.31 ratio         PTT - ( 18 Dec 2022 19:57 )  PTT:30.8 sec  Urinalysis Basic - ( 19 Dec 2022 06:50 )    Color: Yellow / Appearance: Clear / S.010 / pH: x  Gluc: x / Ketone: Large  / Bili: Negative / Urobili: Negative mg/dL   Blood: x / Protein: 30 mg/dL / Nitrite: Negative   Leuk Esterase: Negative / RBC: 0-2 /HPF / WBC 0-2 /HPF   Sq Epi: x / Non Sq Epi: Occasional / Bacteria: Occasional          RADIOLOGY & ADDITIONAL STUDIES:    PATHOLOGY:     REASON FOR CONSULTATION    HPI:  71 yo male with pmhx Prostate Cancer with mets to the bone, recently to the lung, complicated by urinary retention requiring intermittent straight cath presenting to the ED with SOB and LE swelling. Patient reports a cough that he has had for the past 1.5 months but the worsening SOB started only over the past week. He has also noted RLE swelling. He was recently admitted here for a R groin mass which was biopsied during that admission but he has not yet gotten the results.     AT this time, the patient states that he has some abdominal discomfort. He denies chest pain, nausea, vomiting, bleeding.       REVIEW OF SYSTEMS:    REVIEW OF SYSTEMS:   [X All ROS addressed below are non-contributory, except:  Neuro: [ ] Headache [  ]Back pain [ ] Numbness [ ] Weakness [ ] Ataxia [ ] Dizziness [ ] Aphasia [ ] Dysarthria [ ] Visual disturbance  Resp: [ ] Shortness of breath/dyspnea, [ ] Orthopnea [ ] Cough  CV: [ ] Chest pain [ ] Palpitation [ ] Lightheadedness [ ] Syncope  Renal: [ ] Thirst [ ] Edema [ ] hematuria   GI: [ ] Nausea [ ] Emesis [x ] Abdominal pain [ ] Constipation [ ] Diarrhea  Hem: [ ] Hematemesis [ ] bright red blood per rectum  ID: [ ] Fever [ ] Chills [ ] Dysuria  ENT: [ ] Rhinorrhea  Neuro [ ] Numbness [ ] tingling   Psych: [ ] confusion     PAST MEDICAL & SURGICAL HISTORY:  Prostate cancer      S/P appendectomy          SOCIAL HISTORY:    FAMILY HISTORY:  FH: lung cancer (Sibling)        SOCIAL HISTORY:    Allergies    No Known Allergies    Intolerances        MEDICATIONS  (STANDING):  oxyCODONE  ER Tablet 20 milliGRAM(s) Oral every 12 hours    MEDICATIONS  (PRN):  albuterol    90 MICROgram(s) HFA Inhaler 2 Puff(s) Inhalation every 6 hours PRN Shortness of Breath and/or Wheezing  oxyCODONE    IR 10 milliGRAM(s) Oral every 4 hours PRN Severe Pain (7 - 10)      Vital Signs Last 24 Hrs  T(C): 36.7 (20 Dec 2022 08:22), Max: 36.7 (19 Dec 2022 20:05)  T(F): 98.1 (20 Dec 2022 08:22), Max: 98.1 (20 Dec 2022 00:33)  HR: 92 (20 Dec 2022 08:22) (92 - 118)  BP: 118/66 (20 Dec 2022 08:22) (118/66 - 142/73)  BP(mean): --  RR: 20 (20 Dec 2022 08:22) (20 - 20)  SpO2: 90% (20 Dec 2022 08:22) (90% - 91%)    Parameters below as of 20 Dec 2022 08:22  Patient On (Oxygen Delivery Method): nasal cannula  O2 Flow (L/min): 3      PHYSICAL EXAM:    GENERAL: NAD, well-groomed, well-developed  HEAD:  Atraumatic, Normocephalic  EYES: EOMI, PERRLA, conjunctiva and sclera clear  ENMT: No tonsillar erythema, exudates, or enlargement; Moist mucous membranes, Good dentition, No lesions  NECK: Supple, No JVD, Normal thyroid  NERVOUS SYSTEM:  Alert & Oriented X3, Good concentration; Motor Strength 5/5 B/L upper and lower extremities; DTRs 2+ intact and symmetric  CHEST/LUNG: Clear to percussion bilaterally; No rales, rhonchi, wheezing, or rubs  HEART: Regular rate and rhythm; No murmurs, rubs, or gallops  ABDOMEN: Soft, Nontender, Nondistended; Bowel sounds present  EXTREMITIES:  2+ Peripheral Pulses, No clubbing, cyanosis, or edema  LYMPH: No lymphadenopathy noted  SKIN: No rashes or lesions      LABS:                        10.5   14.47 )-----------( 520      ( 20 Dec 2022 03:38 )             33.4     12-20    136  |  100  |  16.8  ----------------------------<  85  3.6   |  21.0<L>  |  0.61    Ca    8.5      20 Dec 2022 03:38  Mg     2.5     12-18    TPro  6.5<L>  /  Alb  2.7<L>  /  TBili  0.5  /  DBili  x   /  AST  38  /  ALT  11  /  AlkPhos  182<H>  12-20    PT/INR - ( 18 Dec 2022 19:57 )   PT: 15.2 sec;   INR: 1.31 ratio         PTT - ( 18 Dec 2022 19:57 )  PTT:30.8 sec  Urinalysis Basic - ( 19 Dec 2022 06:50 )    Color: Yellow / Appearance: Clear / S.010 / pH: x  Gluc: x / Ketone: Large  / Bili: Negative / Urobili: Negative mg/dL   Blood: x / Protein: 30 mg/dL / Nitrite: Negative   Leuk Esterase: Negative / RBC: 0-2 /HPF / WBC 0-2 /HPF   Sq Epi: x / Non Sq Epi: Occasional / Bacteria: Occasional          RADIOLOGY & ADDITIONAL STUDIES:    PATHOLOGY:

## 2022-12-20 NOTE — PROGRESS NOTE ADULT - ASSESSMENT
73 yo male with pmhx Prostate Cancer with mets to the bone, possibly to the lung , complicated by urinary retention requiring intermittent straight cath presenting to the ED with SOB and LE swelling.     Acute Hypoxic Respiratory Failure, 2/2 ?CAP vs. Progression of METs with Pulmonary nodules and Lymphadenopathy  -CTA showing increased Hilar Lymphadenopathy from imaging 2 weeks ago as well as a new small pleural effusions/compressive atelectasis  - patient opted for home w/ hospice    Hematemesis  -Baseline Hg- 10-11  -CTA of chest without active bleeding  -DNR/DNI and comfort measures    RLE swelling  - US duplex- 4.6 x 3.6 x 4.8 cm heterogeneous mass in the right groin. Negative for DVT  - Concern for small cell carcinoma prostate transformation vs secondary malignancy  -DNR/DNI and comfort measures    Metastatic Prostate Cancer  -DNR/DNI and comfort measures    Diet: Regular diet  DVT ppx: Hold given episode of hematemesis   GOC: DNR/DNI. Comfort measures    Dispo- Likely tomorrow. Home w/ hospice Pending home oxygen    Updated patient's brother, Donny ([phone]) on 12/20.

## 2022-12-20 NOTE — CONSULT NOTE ADULT - ASSESSMENT
71 year old male with metastatic prostate cancer ( [facility])  diagnosed on 9/22 prostate bx that showed Adenocarcinoma of the prostate, Prognostic Grade Group 5 (Corinth score 4+5=9), on Lupron abiraterone and Prednisone    PSA was > 1000 in Sept 2021, and decline to 12 in Aug 2022, slight increase to 15 in NOv 2022   Howvere recently patient not feeling well, very fatigue. + night sweats. Noticed enlarging lower abdominal LN.     CT on 12/1/22 ( compared to Aug 2022)  showed Innumerable bilateral pulmonary nodules have developed since 8/1/2022, suspicious for metastatic disease.   New retroperitoneal adenopathy, with necrotic aortocaval 3.3 x 3.2 cm node, Enlarging pelvic adenopathy with : Enlarging, 9.5 x 7.7 cm right obturator node,  previously 2.5 x 1.9 cm. Right common iliac 2.1 x 1.5 cm node  previously 1.3 x 1.5 cm. Bulky right inguinal adenopathy measures 4.5 x 4.2 cm  previously 2.2 x 1.8 cm.   Diffuse bone metastases again noted.    # metastatic Prostate Cancer   # Worsening RP Adenopathy   # worsening lung mets     -  small increase in PSA does not explain the significantly enlarging LADs  Concerning for transformation to small cell carcinoma prostate vs secondary malignancy.  -  Discussed further treatment with chemotherapy given concern for small cell transformation (not biopsy confirmed)/visceral disease vs. continue hormone therapy. However, patient feels strongly about transitioning to hospice, and does not wish to pursue further treatment.   - Biopsy of groin mass still pending.   - hospice following.   - offered follow up with oncology as outpatient. Our office will call him for follow up appointment.

## 2022-12-20 NOTE — PATIENT PROFILE ADULT - FALL HARM RISK - HARM RISK INTERVENTIONS

## 2022-12-21 NOTE — CONSULT NOTE ADULT - ASSESSMENT
72M with prostate cancer with extensive mets to bone, LN and lung  admitted for worsening shortness of breath, BLE edema likely secondary to underlying cancer vs  ?superimposed pneumonia and intractable pain. Multiple GOC by various disciplines including hospitalist, now DNR/DNI comfort measures and pursuit of hospice at home. Palliative consulted for assistance with pain and symptom mgnt.     Prostate Cancer with extensive mets to bone, LN, lung  - on Lupron abiraterone and Prednisone  - oncology input appreciated, "Concerning for transformation to small cell carcinoma prostate vs secondary malignancy" and treatment options were discussed however pt elected to forego treatment and pursue hospice at home  - now on comfort measures per primary team discussion/GOC  - per , home hospice approved by [facility]    Acute Pain, Cancer Related Pain  - multifactorial with likely a psychosocial component of existential pain  - pain exacerbated with movement and coughing as observed during visit  - on Oxycodone ER 20mg q12H ATC --> increased to Oxycodone 30mg q12H ATC  - on Oxy IR 10mg PRN (x 2 doses in past 24 hours)  - IV Dilaudid 0.5mg once in past 24 hours  - pt states minimal relief with oxy IR and better relief with IV Dilaudid  - Switch short acting agent PO oxycodone to PO Dilaudid 2mg and 4mg PRN moderate and severe pain --> encouraged pt to ask for pain medication when he notes escalating pain to prevent acute crisis  - may consider IVP Dilaudid 0.5mg q6H PRN for breakthrough if no relief with oral tablets or in acute crisis    Palliative Care Encounter  - DNR/DNI with comfort measures per hospitalist discussion   - pending home hospice once pain better controlled --> CM assisting with dispo  - Palliative will continue to assist with pain/symptom mgnt.

## 2022-12-21 NOTE — PROGRESS NOTE ADULT - ASSESSMENT
73 yo male with pmhx Prostate Cancer with mets to the bone, possibly to the lung , complicated by urinary retention requiring intermittent straight cath presenting to the ED with SOB and LE swelling.     Acute Hypoxic Respiratory Failure, 2/2 ?CAP vs. Progression of METs with Pulmonary nodules and Lymphadenopathy  -CTA showing increased Hilar Lymphadenopathy from imaging 2 weeks ago as well as a new small pleural effusions/compressive atelectasis  - patient opted for home w/ hospice  - Appreciate palliative recs- started dilaudid PRN and oxycodone ER Q12H    Hematemesis  -Baseline Hg- 10-11  -CTA of chest without active bleeding  -DNR/DNI and comfort measures    RLE swelling  - US duplex- 4.6 x 3.6 x 4.8 cm heterogeneous mass in the right groin. Negative for DVT  - Concern for small cell carcinoma prostate transformation vs secondary malignancy  -DNR/DNI and comfort measures    Metastatic Prostate Cancer  -DNR/DNI and comfort measures    Diet: Regular diet  DVT ppx: Hold given episode of hematemesis   GOC: DNR/DNI. Comfort measures    Dispo- Pending pain control. Home w/ hospice. Will send pain medications to [facility].

## 2022-12-21 NOTE — CONSULT NOTE ADULT - TIME BILLING
Patient position supine. Patient prepped and draped per unit standard.    Safety straps applied:N/A     Left breast  
D/W pt, RN, hospitalist Dr Greco, CM S Spencer    Chart review, meds, labs, imaging, coordination of care with other providers and disciplines re pain/symptom mgnt, metastatic prostate cancer, goc and hospice
I reviewed the Ct images, labs , notes

## 2022-12-21 NOTE — CONSULT NOTE ADULT - REASON FOR ADMISSION
Acute hypoxic respiratory failure 2/2 likely PNA

## 2022-12-21 NOTE — CONSULT NOTE ADULT - SUBJECTIVE AND OBJECTIVE BOX
[facility] PALLIATIVE MEDICINE CONSULT    CC: Patient is a 72y old  Male who presents with a chief complaint of Acute hypoxic respiratory failure 2/2 likely PNA (20 Dec 2022 14:07)    HPI:  Mr. Edward is a 72 year old male with PMHx of prostate cancer with mets to bone and recently to lung, urinary retention requiring intermittent straight cath admitted for worsening dyspnea and bilateral LE edema. ROS + persistent chronic cough x 1.5months. Recent admission for right groin mass s/p biopsy. Multiple goc by hospitalist and infectious disease as noted in chart, now DNR/DNI, on comfort measures and pending home with hospice services. Palliative consulted for assistance with pain/symptom mgnt.     Case discussed with hospitalist.   Met with pt in the ER. Pt confirmed plan of care for home with hospice. He states brother will be coming to stay with him upon discharge.    Present Symptoms:     Dyspnea: no  Nausea/Vomiting:  No  Anxiety:  Yes   Depression: Yes due to situation and terminal diagnosis  Fatigue: Yes    Loss of appetite: Yes    Constipation:  No    Pain: Yes  "all over" and observable grimacing with coughing            Character-            Duration-            Effect-            Factors-            Frequency-            Location-            Severity-    Pain AD Score:  http://geriatrictoolkit.[facility]/cog/painad.pdf (press ctrl + left click to view)    Review of Systems: Reviewed                     Negative: no chest pain or dyspnea at rest                     Positive:  All others negative    PERTINENT PMH REVIEWED: Yes      PAST MEDICAL & SURGICAL HISTORY:  Prostate cancer      S/P appendectomy    FAMILY HISTORY:  FH: lung cancer (Sibling)    Allergies    No Known Allergies    Intolerances        SOCIAL HISTORY:                      Substance history:                    Admitted from:  home                    Children:                     Pentecostalism/spirituality:                    Cultural concerns:       DECISION MAKER(s):[] Health Care Proxy(s)  [] Surrogate(s)  [] Guardian             - surrogate is brother     ADVANCE DIRECTIVES/TREATMENT PREFERENCES: DNR/DNI MOLST by hospitalist    Karnofsky/Palliative Performance Status Version 2:  40%  http://npcrc.org/files/news/palliative_performance_scale_ppsv2.pdf    Baseline ADLs (prior to admission): significant assist with ADLs      MEDICATIONS  (STANDING):  influenza  Vaccine (HIGH DOSE) 0.7 milliLiter(s) IntraMuscular once  oxyCODONE  ER Tablet 20 milliGRAM(s) Oral every 12 hours    MEDICATIONS  (PRN):  albuterol    90 MICROgram(s) HFA Inhaler 2 Puff(s) Inhalation every 6 hours PRN Shortness of Breath and/or Wheezing  benzonatate 100 milliGRAM(s) Oral every 8 hours PRN Cough  oxyCODONE    IR 10 milliGRAM(s) Oral every 4 hours PRN Severe Pain (7 - 10)      PHYSICAL EXAM:    Vital Signs Last 24 Hrs  T(C): 36.4 (21 Dec 2022 08:41), Max: 37.2 (20 Dec 2022 23:00)  T(F): 97.5 (21 Dec 2022 08:41), Max: 99 (20 Dec 2022 23:00)  HR: 110 (21 Dec 2022 08:41) (103 - 113)  BP: 119/71 (21 Dec 2022 08:41) (119/71 - 149/76)  BP(mean): --  RR: 18 (21 Dec 2022 08:41) (18 - 20)  SpO2: 91% (21 Dec 2022 08:41) (90% - 91%)    Parameters below as of 21 Dec 2022 08:41  Patient On (Oxygen Delivery Method): nasal cannula  O2 Flow (L/min): 3      General: chronically ill. resting comfortably and no acute distress.     HEENT: mucous membrane moist      Lungs: comfortable nonlabored      CV: S1/S2. Regular rate and rhythm    GI: +BS abdomen soft, obese, nontender    : normal     MSK: no cyanosis. +edema +weakness      Neuro: nonfocal. awake and alert, oriented x 4, interactive    Skin: warm and dry    Psych: anxious and depressed with his terminal diagnosis    LABS:                        10.5   14.47 )-----------( 520      ( 20 Dec 2022 03:38 )             33.4     12-20    136  |  100  |  16.8  ----------------------------<  85  3.6   |  21.0<L>  |  0.61    Ca    8.5      20 Dec 2022 03:38    TPro  6.5<L>  /  Alb  2.7<L>  /  TBili  0.5  /  DBili  x   /  AST  38  /  ALT  11  /  AlkPhos  182<H>  12-20    I&O's Summary      RADIOLOGY & ADDITIONAL STUDIES: reviewed    CXR    ACC: [id number] EXAM:  XR CHEST PORTABLE URGENT 1V                          PROCEDURE DATE:  12/18/2022          INTERPRETATION:  Portable chest radiograph    CLINICAL INFORMATION: Chest pain    TECHNIQUE:  Portable  AP chest radiograph.    COMPARISON: 12/7/2022 chest x-ray .    FINDINGS:  CATHETERS AND TUBES: None    PULMONARY: Diffuse severe metastatic nodular lung disease. No significant   change.    HEART/VASCULAR: The heart and mediastinum size and configuration are   within normal limits.    BONES: Multiple cysts of thoracic bone metastasis.    IMPRESSION:   Diffuse severe metastatic nodular lung disease. No   significant change..    --- End of Report ---    TYRESE TRENT MD; Attending Radiologist  This document has been electronically signed. Dec 19 2022  8:09PM    BLE Doppler    ACC: [id number] EXAM:  US DPLX LWR EXT VEINS COMPL BI                          PROCEDURE DATE:  12/18/2022          INTERPRETATION:  CLINICAL INFORMATION: Lower extremity swelling. Status   post biopsy of right groin mass 1 week ago.    COMPARISON: None available.    TECHNIQUE: Duplex sonography of the BILATERAL LOWER extremity veins with   color and spectral Doppler, with and without compression.    FINDINGS:    RIGHT:  4.6 x 3.6 x 4.8 cm heterogeneous mass in the right groin. Common femoral   vein/greater saphenous vein are not well evaluated due to patient's   inability to tolerate compression in this region.  Normal compressibility of the RIGHT femoral and popliteal veins.  Doppler examination shows normal spontaneous and phasic flow.  No RIGHT calf vein thrombosis is detected.    LEFT:  Normal compressibility of the LEFT common femoral, femoral and popliteal   veins.  Doppler examination shows normal spontaneous and phasic flow.  No LEFT calf vein thrombosis is detected.    IMPRESSION:  No evidence of deep venous thrombosis in either lower extremity noting   that the right common femoral vein/greater saphenous vein are not well   evaluated due to adjacent groin mass.    --- End of Report ---    VINCENT ALVARENGA MD; Attending Radiologist  This document has been electronically signed. Dec 19 2022  1:29AM    CTA 12/19/22    ACC: [id number] EXAM:  CT ANGIO CHEST PULM [facility]                          PROCEDURE DATE:  12/19/2022          INTERPRETATION:  CLINICAL INFORMATION: Metastatic prostate cancer.    Cough.  Right lower extremity swelling.  Hypoxia.  Chest pain two days   ago.  Rule out pulmonary embolism.    COMPARISON: 12/07/2022    CONTRAST/COMPLICATIONS:  IV Contrast: Omnipaque 350  93 cc administered   7 cc discarded  Oral Contrast: NONE  Complications: None reported at time of study completion    PROCEDURE:  CT Angiography of the Chest.  Sagittal and coronal reformats were performed as well as 3D (MIP)   reconstructions.    FINDINGS:  TUBES/LINES: None    LUNGS, PLEURA, AND AIRWAYS:  Innumerable bilateral pulmonary nodules are   not significant change from 12/07/2022.  A small right pleural effusion   and compressive atelectasis the right lower lobe are new from 12/07/2022.    VASCULATURE: Within normal limits.  No pulmonary embolism.  No thoracic   aortic aneurysm or dissection.    HEART: Heart size is normal.  No pericardial effusion.    MEDIASTINUM AND SCOTT: There is mild mediastinal and bilateral hilar   lymphadenopathy that appears increased from 12/07/2022.  For example a   right lower paratracheal lymph node measures 1.6 cm, increased from 1.1   cm previously.  A subcarinal lymph node measures 1.1 cm short axis   (7:75), increased from 6 mm previously.  A left hilar lymph node measures   9 mm short axis (7:64), increased from 6 mm previously.    BONES AND SOFT TISSUES: Diffuse osseous metastatic disease in the   visualized spine, ribs, right scapula, right humerus and left scapula is   grossly stable.  Multiple pathologic left rib fractures were present   previously.  A mild compression fracture at T2 and a moderate compression   fracture at T4 are stable.  No new fracture is identified.    VISUALIZED NECK AND ABDOMEN: Unremarkable.      AI VERTEBRAL BODY ANALYSIS:  T4: Moderate compression fracture with 38% height loss.    IMPRESSION:    No pulmonary embolus.    A small right pleural effusion and compressive atelectasis in the right   lower lobe are new from 12/07/2022.    There is mediastinal and hilar lymphadenopathy that appears increased   from 12/07/2022    Innumerable pulmonary nodules and diffuse osseous metastatic disease is   unchanged from 12/07/2022.    VERTEBRAL BODY ANALYSIS: Vertebral compression fractures as described,   consider further workup for osteoporosis.    --- End of Report ---    QUETA HARO MD; Attending Radiologist  This document has been electronically signed. Dec 19 2022  3:50AM      NEUROLOGICAL MEDICATIONS/OPIOIDS/BENZODIAZEPINE OVER PAST 24 HOURS    HYDROmorphone  Injectable   0.5 milliGRAM(s) IV Push (12-21-22 @ 10:51)    oxyCODONE    IR   10 milliGRAM(s) Oral (12-20-22 @ 23:38)   10 milliGRAM(s) Oral (12-20-22 @ 15:16)    oxyCODONE  ER Tablet   20 milliGRAM(s) Oral (12-21-22 @ 06:04)   20 milliGRAM(s) Oral (12-20-22 @ 18:13)

## 2022-12-22 NOTE — DISCHARGE NOTE NURSING/CASE MANAGEMENT/SOCIAL WORK - PATIENT PORTAL LINK FT
You can access the FollowMyHealth Patient Portal offered by [facility] by registering at the following website: [email or web address]. By joining [facility]’s FollowMyHealth portal, you will also be able to view your health information using other applications (apps) compatible with our system.

## 2022-12-22 NOTE — PROGRESS NOTE ADULT - REASON FOR ADMISSION
Acute hypoxic respiratory failure 2/2 likely PNA

## 2022-12-22 NOTE — PROGRESS NOTE ADULT - TIME BILLING
D/W pt, RN, hospice and MARCOS SALTER Quan    Chart review, meds, labs, imaging, coordination of care with other providers and disciplines, review of pain and medications, coordination with hospice

## 2022-12-22 NOTE — DISCHARGE NOTE PROVIDER - NSDCCPCAREPLAN_GEN_ALL_CORE_FT
PRINCIPAL DISCHARGE DIAGNOSIS  Diagnosis: Acute respiratory failure with hypoxia  Assessment and Plan of Treatment: - Concern for pneumonia vs. worsening metastatic disease.   - Transitioned to comfort care with home hospice services.      SECONDARY DISCHARGE DIAGNOSES  Diagnosis: Prostate cancer metastatic to multiple sites  Assessment and Plan of Treatment: - Transitioned to comfort care with home hospice.    Diagnosis: Hematemesis  Assessment and Plan of Treatment: - Episode of hematemesis in the ED. Hemoglobin remained stable.   - Transitioned to comfort care with home hospice.    Diagnosis: Swelling of right lower extremity  Assessment and Plan of Treatment: - Imaging negative for DVT.   - Transitioned to comfort care with home hospice.

## 2022-12-22 NOTE — DISCHARGE NOTE PROVIDER - NSDCFUSCHEDAPPT_GEN_ALL_CORE_FT
Brooks Jefferson  [facility]  UROLOGY [address]  Scheduled Appointment: 12/27/2022    Mannie Pena  [facility]  [facility]  Scheduled Appointment: 12/28/2022    [facility]  [facility]  Scheduled Appointment: 01/03/2023    [facility]  [facility]  Scheduled Appointment: 02/07/2023

## 2022-12-22 NOTE — PROGRESS NOTE ADULT - ASSESSMENT
72M with prostate cancer with extensive mets to bone, LN and lung  admitted for worsening shortness of breath, BLE edema likely secondary to underlying cancer vs  ?superimposed pneumonia and intractable pain. Multiple GOC by various disciplines including hospitalist, now DNR/DNI comfort measures and pursuit of hospice at home. Palliative following for assistance with pain and symptom mgnt.     Prostate Cancer with extensive mets to bone, LN, lung  - was on Lupron abiraterone and Prednisone  - oncology input appreciated, "Concerning for transformation to small cell carcinoma prostate vs secondary malignancy" and treatment options were discussed however pt elected to forego treatment and pursue hospice at home  - now on comfort measures per primary team discussion/GOC  - home hospice approved and services being put into place    Acute Pain, Cancer Related Pain  - multifactorial with likely underlying existential pain  - on Oxycontin 30mg q12H ATC  - PO oxycodone to PO Dilaudid 2mg and 4mg PRN moderate and severe pain         Palliative Care Encounter  - DNR/DNI with comfort measures per hospitalist discussion   - pending home hospice once pain better controlled --> CM assisting with dispo  - Palliative will continue to assist with pain/symptom mgnt.    72M with prostate cancer with extensive mets to bone, LN and lung  admitted for worsening shortness of breath, BLE edema likely secondary to underlying cancer vs  ?superimposed pneumonia and intractable pain. Multiple GOC by various disciplines including hospitalist, now DNR/DNI comfort measures and pursuit of hospice at home. Palliative following for assistance with pain and symptom mgnt.     Prostate Cancer with extensive mets to bone, LN, lung  - was on Lupron abiraterone and Prednisone  - oncology input appreciated, "Concerning for transformation to small cell carcinoma prostate vs secondary malignancy" and treatment options were discussed however pt elected to forego treatment and pursue hospice at home  - now on comfort measures per primary team discussion/GOC  - home hospice approved and services being put into place    Acute Pain, Cancer Related Pain  - better relief with pain regimen adjustment yesterday  - multifactorial with likely underlying existential pain  - on Oxycontin 30mg q12H ATC  - PO Dilaudid 2mg and 4mg PRN moderate and severe pain       Palliative Care Encounter  - DNR/DNI with comfort measures per hospitalist discussion   - home hospice arrangements in process, likely discharge today

## 2022-12-22 NOTE — PROGRESS NOTE ADULT - SUBJECTIVE AND OBJECTIVE BOX
[facility] Division of Hospital Medicine    Chief Complaint:  cough and leg swelling      SUBJECTIVE / OVERNIGHT EVENTS: No acute events overnight. Patient tachy to 10ss and require NC 3L. Freed done episode of hematemesis. Denies n/v/f/c/h/d and abdominal pain.     Patient denies chest pain, SOB, abd pain, N/V, fever, chills, dysuria or any other complaints. All remainder ROS negative.     MEDICATIONS  (STANDING):  azithromycin  IVPB      cefepime  Injectable. 2000 milliGRAM(s) IV Push every 8 hours  finasteride 5 milliGRAM(s) Oral daily  guaiFENesin Oral Liquid (Sugar-Free) 100 milliGRAM(s) Oral every 4 hours  oxyCODONE  ER Tablet 20 milliGRAM(s) Oral every 12 hours  pantoprazole  Injectable 40 milliGRAM(s) IV Push two times a day  potassium chloride  10 mEq/100 mL IVPB 10 milliEquivalent(s) IV Intermittent every 1 hour  predniSONE   Tablet 5 milliGRAM(s) Oral daily  tamsulosin 0.4 milliGRAM(s) Oral at bedtime    MEDICATIONS  (PRN):  acetaminophen     Tablet .. 650 milliGRAM(s) Oral every 6 hours PRN Temp greater or equal to 38C (100.4F), Mild Pain (1 - 3)  albuterol    90 MICROgram(s) HFA Inhaler 2 Puff(s) Inhalation every 6 hours PRN Shortness of Breath and/or Wheezing  aluminum hydroxide/magnesium hydroxide/simethicone Suspension 30 milliLiter(s) Oral every 4 hours PRN Dyspepsia  benzonatate 100 milliGRAM(s) Oral every 8 hours PRN Cough  melatonin 3 milliGRAM(s) Oral at bedtime PRN Insomnia  ondansetron Injectable 4 milliGRAM(s) IV Push every 8 hours PRN Nausea and/or Vomiting  oxyCODONE    IR 10 milliGRAM(s) Oral every 4 hours PRN Severe Pain (7 - 10)        I&O's Summary      PHYSICAL EXAM:  Vital Signs Last 24 Hrs  T(C): 36.6 (19 Dec 2022 07:28), Max: 36.8 (18 Dec 2022 18:25)  T(F): 97.8 (19 Dec 2022 07:28), Max: 98.3 (18 Dec 2022 18:25)  HR: 106 (19 Dec 2022 07:28) (100 - 111)  BP: 146/73 (19 Dec 2022 07:28) (121/75 - 165/79)  BP(mean): --  RR: 20 (19 Dec 2022 07:28) (18 - 22)  SpO2: 91% (19 Dec 2022 07:28) (90% - 93%)    Parameters below as of 19 Dec 2022 07:28  Patient On (Oxygen Delivery Method): nasal cannula  O2 Flow (L/min): 3    CONSTITUTIONAL: NAD, well-developed  RESPIRATORY: Normal respiratory effort; lungs are clear to auscultation bilaterally  CARDIOVASCULAR: Regular rate and rhythm, normal S1 and S2, no murmur/rub/gallop; RLE 3+ pitting edema, LLE w/ no edema; Peripheral pulses are 2+ bilaterally  ABDOMEN: Nontender to palpation, normoactive bowel sounds, no rebound/guarding  PSYCH: A+O to person, place, and time; affect appropriate  NEUROLOGY: CN 2-12 are intact and symmetric; no gross sensory deficits    LABS:                        10.6   13.14 )-----------( 510      ( 19 Dec 2022 07:06 )             32.4     12-19    136  |  98  |  19.4  ----------------------------<  119<H>  3.3<L>   |  21.0<L>  |  0.65    Ca    8.6      19 Dec 2022 07:06  Mg     2.5     12-18    TPro  6.8  /  Alb  3.1<L>  /  TBili  0.5  /  DBili  x   /  AST  33  /  ALT  10  /  AlkPhos  195<H>  12-18    PT/INR - ( 18 Dec 2022 19:57 )   PT: 15.2 sec;   INR: 1.31 ratio         PTT - ( 18 Dec 2022 19:57 )  PTT:30.8 sec  CARDIAC MARKERS ( 18 Dec 2022 19:57 )  x     / <0.01 ng/mL / x     / x     / x          Urinalysis Basic - ( 19 Dec 2022 06:50 )    Color: Yellow / Appearance: Clear / S.010 / pH: x  Gluc: x / Ketone: Large  / Bili: Negative / Urobili: Negative mg/dL   Blood: x / Protein: 30 mg/dL / Nitrite: Negative   Leuk Esterase: Negative / RBC: 0-2 /HPF / WBC 0-2 /HPF   Sq Epi: x / Non Sq Epi: Occasional / Bacteria: Occasional        CAPILLARY BLOOD GLUCOSE            RADIOLOGY & ADDITIONAL TESTS:  Results Reviewed:   Imaging Personally Reviewed:  Electrocardiogram Personally Reviewed:                                          
[facility] PALLIATIVE MEDICINE     CC: FOLLOW UP VISIT + GOC    INTERVAL HPI/OVERNIGHT EVENTS:  Source if other than patient:  []Family   [x]Team     Remains on comfort measures.   Seen in ER. Pt reports pain is better controlled today. He appeared comfortable and talking to family on phone during visit.     PRESENT SYMPTOMS:     Dyspnea: no  Nausea/Vomiting:  No  Anxiety:  Yes intermittent  Depression: Yes due to situation  Fatigue: Yes    Loss of appetite: Yes   Constipation:  No    Pain: Improving abdominal pain with adjustment in pain regimen yesterday            Character-            Duration-            Effect-            Factors-            Frequency-            Location-            Severity-    Pain AD Score:  [email or web address] (press ctrl + left click to view)    Review of Systems: Reviewed                     Negative: no chest pain or dyspnea at rest                     Positive: see above  All others negative     MEDICATIONS  (STANDING):  influenza  Vaccine (HIGH DOSE) 0.7 milliLiter(s) IntraMuscular once  oxyCODONE  ER Tablet 30 milliGRAM(s) Oral every 12 hours    MEDICATIONS  (PRN):  albuterol    90 MICROgram(s) HFA Inhaler 2 Puff(s) Inhalation every 6 hours PRN Shortness of Breath and/or Wheezing  benzonatate 100 milliGRAM(s) Oral every 8 hours PRN Cough  HYDROmorphone   Tablet 2 milliGRAM(s) Oral every 3 hours PRN Moderate Pain (4 - 6)  HYDROmorphone   Tablet 4 milliGRAM(s) Oral every 3 hours PRN Severe Pain (7 - 10)      PHYSICAL EXAM:    Vital Signs Last 24 Hrs  T(C): 36.5 (22 Dec 2022 10:55), Max: 36.7 (21 Dec 2022 20:11)  T(F): 97.7 (22 Dec 2022 10:55), Max: 98 (21 Dec 2022 20:11)  HR: 114 (22 Dec 2022 10:55) (106 - 120)  BP: 112/66 (22 Dec 2022 10:55) (112/66 - 152/79)  BP(mean): --  RR: 18 (22 Dec 2022 10:55) (18 - 18)  SpO2: 90% (22 Dec 2022 10:55) (90% - 92%)    Parameters below as of 22 Dec 2022 10:55  Patient On (Oxygen Delivery Method): nasal cannula    Karnofsky:  40%    GEN: chronically ill. resting comfortably and no acute distress    HEENT: mucous membrane moist      Lungs: comfortable, nonlabored     CV: +s1/s2 regular rate and rhythm     GI: +BS, abdomen soft, obese, nontender     : normal    MSK:  no cyanosis +edema +weakness    NEURO: nonfocal. awake and alert, interactive    Skin: warm and dry.       LABS: reviewed  RADIOLOGY & ADDITIONAL STUDIES: Reviewed     ADVANCE DIRECTIVES/TREATMENT PREFERENCES: DNR/DNI MOLST by hospitalist    NEUROLOGICAL MEDICATIONS/OPIOIDS/BENZODIAZEPINE IN PAST 24 HOURS    HYDROmorphone   Tablet   2 milliGRAM(s) Oral (12-22-22 @ 09:45)    HYDROmorphone   Tablet   4 milliGRAM(s) Oral (12-21-22 @ 17:04)    oxyCODONE  ER Tablet   30 milliGRAM(s) Oral (12-22-22 @ 05:48)   30 milliGRAM(s) Oral (12-21-22 @ 17:03)    
[facility] Division of Hospital Medicine    Chief Complaint: Acute hypoxic respiratory failure 2/2 likely PNA      SUBJECTIVE / OVERNIGHT EVENTS: No acute events overnight. Patient continues to endorse diffuse pain and insists on going home with hospice.     Patient denies chest pain, SOB, abd pain, N/V, fever, chills, dysuria or any other complaints. All remainder ROS negative.     MEDICATIONS  (STANDING):  influenza  Vaccine (HIGH DOSE) 0.7 milliLiter(s) IntraMuscular once  oxyCODONE  ER Tablet 30 milliGRAM(s) Oral every 12 hours    MEDICATIONS  (PRN):  albuterol    90 MICROgram(s) HFA Inhaler 2 Puff(s) Inhalation every 6 hours PRN Shortness of Breath and/or Wheezing  benzonatate 100 milliGRAM(s) Oral every 8 hours PRN Cough  HYDROmorphone   Tablet 2 milliGRAM(s) Oral every 3 hours PRN Moderate Pain (4 - 6)  HYDROmorphone   Tablet 4 milliGRAM(s) Oral every 3 hours PRN Severe Pain (7 - 10)        I&O's Summary      PHYSICAL EXAM:  Vital Signs Last 24 Hrs  T(C): 36.4 (21 Dec 2022 08:41), Max: 37.2 (20 Dec 2022 23:00)  T(F): 97.5 (21 Dec 2022 08:41), Max: 99 (20 Dec 2022 23:00)  HR: 110 (21 Dec 2022 08:41) (103 - 113)  BP: 119/71 (21 Dec 2022 08:41) (119/71 - 149/76)  BP(mean): --  RR: 18 (21 Dec 2022 08:41) (18 - 20)  SpO2: 91% (21 Dec 2022 08:41) (90% - 91%)    Parameters below as of 21 Dec 2022 08:41  Patient On (Oxygen Delivery Method): nasal cannula  O2 Flow (L/min): 3          CONSTITUTIONAL: NAD, well-developed  RESPIRATORY: Normal respiratory effort; lungs are clear to auscultation bilaterally  CARDIOVASCULAR: Regular rate and rhythm, normal S1 and S2, no murmur/rub/gallop; RLE 3+ pitting edema, LLE w/ no edema; Peripheral pulses are 2+ bilaterally  ABDOMEN: Nontender to palpation, normoactive bowel sounds, no rebound/guarding  PSYCH: A+O to person, place, and time; affect appropriate  NEUROLOGY: CN 2-12 are intact and symmetric; no gross sensory deficits      LABS:                        10.5   14.47 )-----------( 520      ( 20 Dec 2022 03:38 )             33.4     12-20    136  |  100  |  16.8  ----------------------------<  85  3.6   |  21.0<L>  |  0.61    Ca    8.5      20 Dec 2022 03:38    TPro  6.5<L>  /  Alb  2.7<L>  /  TBili  0.5  /  DBili  x   /  AST  38  /  ALT  11  /  AlkPhos  182<H>  12-20              Culture - Urine (collected 19 Dec 2022 06:50)  Source: Clean Catch Clean Catch (Midstream)  Preliminary Report (20 Dec 2022 14:27):    50,000 - 99,000 CFU/mL Gram positive organisms    <10,000 CFU/ml Normal Urogenital jackie present    Culture - Blood (collected 18 Dec 2022 20:15)  Source: .Blood Blood  Preliminary Report (20 Dec 2022 02:02):    No growth to date.    Culture - Blood (collected 18 Dec 2022 19:57)  Source: .Blood Blood  Preliminary Report (20 Dec 2022 02:02):    No growth to date.      CAPILLARY BLOOD GLUCOSE            RADIOLOGY & ADDITIONAL TESTS:  Results Reviewed:   Imaging Personally Reviewed:  Electrocardiogram Personally Reviewed:                                          
[facility] Division of Hospital Medicine    Chief Complaint: cough and leg swelling       SUBJECTIVE / OVERNIGHT EVENTS: No acute events overnight. Patient endorses wanting to be on home w/ hospice. Denies any interventions and has been refusing further workup and management.     Patient denies chest pain, SOB, abd pain, N/V, fever, chills, dysuria or any other complaints. All remainder ROS negative.     MEDICATIONS  (STANDING):  oxyCODONE  ER Tablet 20 milliGRAM(s) Oral every 12 hours    MEDICATIONS  (PRN):  albuterol    90 MICROgram(s) HFA Inhaler 2 Puff(s) Inhalation every 6 hours PRN Shortness of Breath and/or Wheezing  oxyCODONE    IR 10 milliGRAM(s) Oral every 4 hours PRN Severe Pain (7 - 10)        I&O's Summary    19 Dec 2022 07:01  -  20 Dec 2022 07:00  --------------------------------------------------------  IN: 800 mL / OUT: 800 mL / NET: 0 mL        PHYSICAL EXAM:  Vital Signs Last 24 Hrs  T(C): 36.7 (20 Dec 2022 08:22), Max: 36.7 (19 Dec 2022 20:05)  T(F): 98.1 (20 Dec 2022 08:22), Max: 98.1 (20 Dec 2022 00:33)  HR: 92 (20 Dec 2022 08:22) (92 - 118)  BP: 118/66 (20 Dec 2022 08:22) (118/66 - 142/73)  BP(mean): --  RR: 20 (20 Dec 2022 08:22) (20 - 20)  SpO2: 90% (20 Dec 2022 08:22) (90% - 91%)    Parameters below as of 20 Dec 2022 08:22  Patient On (Oxygen Delivery Method): nasal cannula  O2 Flow (L/min): 3      CONSTITUTIONAL: NAD, well-developed  RESPIRATORY: Normal respiratory effort; lungs are clear to auscultation bilaterally  CARDIOVASCULAR: Regular rate and rhythm, normal S1 and S2, no murmur/rub/gallop; RLE 3+ pitting edema, LLE w/ no edema; Peripheral pulses are 2+ bilaterally  ABDOMEN: Nontender to palpation, normoactive bowel sounds, no rebound/guarding  PSYCH: A+O to person, place, and time; affect appropriate  NEUROLOGY: CN 2-12 are intact and symmetric; no gross sensory deficits      LABS:                        10.5   14.47 )-----------( 520      ( 20 Dec 2022 03:38 )             33.4     12-20    136  |  100  |  16.8  ----------------------------<  85  3.6   |  21.0<L>  |  0.61    Ca    8.5      20 Dec 2022 03:38  Mg     2.5     12-18    TPro  6.5<L>  /  Alb  2.7<L>  /  TBili  0.5  /  DBili  x   /  AST  38  /  ALT  11  /  AlkPhos  182<H>  12-20    PT/INR - ( 18 Dec 2022 19:57 )   PT: 15.2 sec;   INR: 1.31 ratio         PTT - ( 18 Dec 2022 19:57 )  PTT:30.8 sec  CARDIAC MARKERS ( 18 Dec 2022 19:57 )  x     / <0.01 ng/mL / x     / x     / x          Urinalysis Basic - ( 19 Dec 2022 06:50 )    Color: Yellow / Appearance: Clear / S.010 / pH: x  Gluc: x / Ketone: Large  / Bili: Negative / Urobili: Negative mg/dL   Blood: x / Protein: 30 mg/dL / Nitrite: Negative   Leuk Esterase: Negative / RBC: 0-2 /HPF / WBC 0-2 /HPF   Sq Epi: x / Non Sq Epi: Occasional / Bacteria: Occasional        Culture - Blood (collected 18 Dec 2022 20:15)  Source: .Blood Blood  Preliminary Report (20 Dec 2022 02:02):    No growth to date.    Culture - Blood (collected 18 Dec 2022 19:57)  Source: .Blood Blood  Preliminary Report (20 Dec 2022 02:02):    No growth to date.      CAPILLARY BLOOD GLUCOSE            RADIOLOGY & ADDITIONAL TESTS:  Results Reviewed:   Imaging Personally Reviewed:  Electrocardiogram Personally Reviewed:

## 2022-12-22 NOTE — DISCHARGE NOTE PROVIDER - HOSPITAL COURSE
72 year old male with PMHx of prostate cancer with extensive mets to bone, LN and lung, who presented to [facility] ED with c/o worsening dyspnea and LE swelling. In the ED, patient meeting SIRS criteria, and had 1 episode of hematemesis. CTA showing increased Hilar Lymphadenopathy from imaging 2 weeks ago as well as a new small pleural effusions/compressive atelectasis, no active bleeding. Patient initiated on empiric ABX for concern of PNA vs progression of mets. Patient admitted for management of acute hypoxic respiratory failure with intractable cancer pain. RLE returned negative for DVT. Hgb remained stable without active bleeding. Pain management regimen initiated for CA pain. During admission, multiple GOC conversations held, and patient elected for DNR/DNI with comfort measures only. Patient is medically stable for discharge with home hospice.

## 2022-12-22 NOTE — DISCHARGE NOTE PROVIDER - ATTENDING DISCHARGE PHYSICAL EXAMINATION:
Vital Signs Last 24 Hrs  T(F): 97.7 (22 Dec 2022 10:55), Max: 98 (21 Dec 2022 20:11)  HR: 114 (22 Dec 2022 10:55) (106 - 120)  BP: 112/66 (22 Dec 2022 10:55) (112/66 - 152/79)  RR: 18 (22 Dec 2022 10:55) (18 - 18)  SpO2: 90% (22 Dec 2022 10:55) (90% - 92%)    Physical Exam:  CONSTITUTIONAL: NAD, well-developed  RESPIRATORY: Normal respiratory effort; lungs are clear to auscultation bilaterally  CARDIOVASCULAR: Regular rate and rhythm, normal S1 and S2, no murmur/rub/gallop; RLE 3+ pitting edema, LLE w/ no edema; Peripheral pulses are 2+ bilaterally  ABDOMEN: Nontender to palpation, normoactive bowel sounds, no rebound/guarding  PSYCH: A+O to person, place, and time; affect appropriate  NEUROLOGY: CN 2-12 are intact and symmetric; no gross sensory deficits

## 2022-12-27 ENCOUNTER — NON-APPOINTMENT (OUTPATIENT)
Age: 72
End: 2022-12-27

## 2022-12-27 ENCOUNTER — APPOINTMENT (OUTPATIENT)
Dept: UROLOGY | Facility: CLINIC | Age: 72
End: 2022-12-27

## 2022-12-28 ENCOUNTER — APPOINTMENT (OUTPATIENT)
Dept: HEMATOLOGY ONCOLOGY | Facility: CLINIC | Age: 72
End: 2022-12-28

## 2023-01-03 ENCOUNTER — APPOINTMENT (OUTPATIENT)
Age: 73
End: 2023-01-03

## 2023-01-03 LAB — SURGICAL PATHOLOGY STUDY: SIGNIFICANT CHANGE UP

## 2023-01-19 NOTE — DISCHARGE NOTE NURSING/CASE MANAGEMENT/SOCIAL WORK - HISTORY OF COVID-19 VACCINATION
· Regimen: Hyzaar 100-12 5 mg daily  · Blood pressure controlled postop  · Restart Hyzaar in the morning No Yes

## 2023-02-07 ENCOUNTER — APPOINTMENT (OUTPATIENT)
Age: 73
End: 2023-02-07

## 2023-09-11 NOTE — PATIENT PROFILE ADULT - FALL HARM RISK - DEVICES
None Assistance with ambulation/Assistance OOB with selected safe patient handling equipment/Communicate Risk of Fall with Harm to all staff/Reinforce activity limits and safety measures with patient and family/Tailored Fall Risk Interventions/Visual Cue: Yellow wristband and red socks/Bed in lowest position, wheels locked, appropriate side rails in place/Call bell, personal items and telephone in reach/Instruct patient to call for assistance before getting out of bed or chair/Non-slip footwear when patient is out of bed/[place] to call system/Physically safe environment - no spills, clutter or unnecessary equipment/Purposeful Proactive Rounding/Room/bathroom lighting operational, light cord in reach

## 2023-12-06 NOTE — ED ADULT TRIAGE NOTE - BP NONINVASIVE DIASTOLIC (MM HG)
Patient was triaged however left ED prior to being roomed and seen by any medical providers.      Uma Dempsey MD  LT, [facility], [address] [place] [address]  PGY-1 / [address]  Pager: [phone]     Uma Dempsey MD  Resident  12/05/23 [id number]

## 2024-01-01 NOTE — ASSESSMENT
[FreeTextEntry1] : 1.)  Urinary retention secondary to BPH\par Last TOV 12/9/2020-unsuccessful\par Continue tamsulosin 2 capsules at bedtime and finasteride.\par \par RTO 12/28/2020 at 9 AM for another T OV.. Not Available

## 2024-03-04 NOTE — DISCHARGE NOTE PROVIDER - NSDCMRMEDTOKEN_GEN_ALL_CORE_FT
abiraterone 250 mg oral tablet: 4 tab(s) orally once a day  benzonatate 100 mg oral capsule: 2 cap(s) orally every 8 hours, As needed, Cough  finasteride 5 mg oral tablet: 1 tab(s) orally once a day  guaiFENesin 200 mg oral tablet: 1 tab(s) orally every 4 hours, As Needed -for cough   oxyCODONE 10 mg oral tablet: 1 tab(s) orally every 4 hours, As Needed -Severe Pain (7 - 10) MDD:6   oxyCODONE 20 mg oral tablet, extended release: 1 tab(s) orally every 12 hours MDD:2  predniSONE 5 mg oral tablet: 1 tab(s) orally once a day  tamsulosin 0.4 mg oral capsule: 1 cap(s) orally once a day (at bedtime)   [Was the competent medical cause of the injury] : was the competent medical cause of the injury [Are consistent with the injury] : are consistent with the injury [Consistent with my objective findings] : consistent with my objective findings abiraterone 250 mg oral tablet: 4 tab(s) orally once a day  albuterol 90 mcg/inh inhalation aerosol: 2 puff(s) inhaled every 6 hours, As needed, Shortness of Breath and/or Wheezing  benzonatate 100 mg oral capsule: 2 cap(s) orally every 8 hours, As needed, Cough  finasteride 5 mg oral tablet: 1 tab(s) orally once a day  HYDROmorphone 4 mg oral tablet: 1 tab(s) orally every 6 to 8 hours, As Needed , Severe Pain (7 - 10) MDD:16mg  oxyCODONE 20 mg oral tablet, extended release: 1 tab(s) orally every 12 hours MDD:40mg  predniSONE 5 mg oral tablet: 1 tab(s) orally once a day  tamsulosin 0.4 mg oral capsule: 1 cap(s) orally once a day (at bedtime)

## 2025-07-28 NOTE — PATIENT PROFILE ADULT - NSPROMEDSADMININFO_GEN_A_NUR
Problem: Fall Injury Risk  Goal: Absence of Fall and Fall-Related Injury  Outcome: Progressing     Problem: Skin Injury Risk Increased  Goal: Skin Health and Integrity  Outcome: Progressing     Problem: Infection  Goal: Absence of Infection Signs and Symptoms  Outcome: Progressing     Problem: Acute Kidney Injury/Impairment  Goal: Fluid and Electrolyte Balance  Outcome: Progressing  Goal: Improved Oral Intake  Outcome: Progressing  Goal: Effective Renal Function  Outcome: Progressing      no concerns